# Patient Record
Sex: MALE | Race: WHITE | NOT HISPANIC OR LATINO | Employment: OTHER | URBAN - METROPOLITAN AREA
[De-identification: names, ages, dates, MRNs, and addresses within clinical notes are randomized per-mention and may not be internally consistent; named-entity substitution may affect disease eponyms.]

---

## 2023-08-22 ENCOUNTER — APPOINTMENT (OUTPATIENT)
Dept: MRI IMAGING | Facility: CLINIC | Age: 53
End: 2023-08-22
Attending: EMERGENCY MEDICINE
Payer: COMMERCIAL

## 2023-08-22 ENCOUNTER — APPOINTMENT (OUTPATIENT)
Dept: CT IMAGING | Facility: CLINIC | Age: 53
End: 2023-08-22
Attending: EMERGENCY MEDICINE
Payer: COMMERCIAL

## 2023-08-22 ENCOUNTER — HOSPITAL ENCOUNTER (EMERGENCY)
Facility: CLINIC | Age: 53
Discharge: HOME | End: 2023-08-22
Attending: EMERGENCY MEDICINE | Admitting: EMERGENCY MEDICINE
Payer: COMMERCIAL

## 2023-08-22 VITALS
DIASTOLIC BLOOD PRESSURE: 109 MMHG | OXYGEN SATURATION: 98 % | RESPIRATION RATE: 18 BRPM | SYSTOLIC BLOOD PRESSURE: 147 MMHG | WEIGHT: 195 LBS | HEART RATE: 80 BPM | TEMPERATURE: 98.2 F

## 2023-08-22 DIAGNOSIS — M50.222 HERNIATION OF INTERVERTEBRAL DISC AT C5-C6 LEVEL: ICD-10-CM

## 2023-08-22 DIAGNOSIS — M48.02 CERVICAL STENOSIS OF SPINAL CANAL: ICD-10-CM

## 2023-08-22 LAB
ANION GAP SERPL CALCULATED.3IONS-SCNC: 9 MMOL/L (ref 7–15)
ATRIAL RATE - MUSE: 67 BPM
BASOPHILS # BLD AUTO: 0.1 10E3/UL (ref 0–0.2)
BASOPHILS NFR BLD AUTO: 0 %
BUN SERPL-MCNC: 16 MG/DL (ref 6–20)
CALCIUM SERPL-MCNC: 9 MG/DL (ref 8.6–10)
CHLORIDE SERPL-SCNC: 103 MMOL/L (ref 98–107)
CREAT SERPL-MCNC: 0.98 MG/DL (ref 0.67–1.17)
DEPRECATED HCO3 PLAS-SCNC: 27 MMOL/L (ref 22–29)
DIASTOLIC BLOOD PRESSURE - MUSE: NORMAL MMHG
EOSINOPHIL # BLD AUTO: 0.1 10E3/UL (ref 0–0.7)
EOSINOPHIL NFR BLD AUTO: 1 %
ERYTHROCYTE [DISTWIDTH] IN BLOOD BY AUTOMATED COUNT: 13 % (ref 10–15)
GFR SERPL CREATININE-BSD FRML MDRD: >90 ML/MIN/1.73M2
GLUCOSE SERPL-MCNC: 99 MG/DL (ref 70–99)
HCT VFR BLD AUTO: 44.6 % (ref 40–53)
HGB BLD-MCNC: 14.9 G/DL (ref 13.3–17.7)
HOLD SPECIMEN: NORMAL
HOLD SPECIMEN: NORMAL
IMM GRANULOCYTES # BLD: 0 10E3/UL
IMM GRANULOCYTES NFR BLD: 0 %
INTERPRETATION ECG - MUSE: NORMAL
LYMPHOCYTES # BLD AUTO: 1.9 10E3/UL (ref 0.8–5.3)
LYMPHOCYTES NFR BLD AUTO: 16 %
MCH RBC QN AUTO: 29.3 PG (ref 26.5–33)
MCHC RBC AUTO-ENTMCNC: 33.4 G/DL (ref 31.5–36.5)
MCV RBC AUTO: 88 FL (ref 78–100)
MONOCYTES # BLD AUTO: 0.7 10E3/UL (ref 0–1.3)
MONOCYTES NFR BLD AUTO: 6 %
NEUTROPHILS # BLD AUTO: 9.2 10E3/UL (ref 1.6–8.3)
NEUTROPHILS NFR BLD AUTO: 77 %
NRBC # BLD AUTO: 0 10E3/UL
NRBC BLD AUTO-RTO: 0 /100
P AXIS - MUSE: 10 DEGREES
PLATELET # BLD AUTO: 265 10E3/UL (ref 150–450)
POTASSIUM SERPL-SCNC: 4.3 MMOL/L (ref 3.4–5.3)
PR INTERVAL - MUSE: 148 MS
QRS DURATION - MUSE: 88 MS
QT - MUSE: 416 MS
QTC - MUSE: 439 MS
R AXIS - MUSE: 56 DEGREES
RBC # BLD AUTO: 5.08 10E6/UL (ref 4.4–5.9)
SODIUM SERPL-SCNC: 139 MMOL/L (ref 136–145)
SYSTOLIC BLOOD PRESSURE - MUSE: NORMAL MMHG
T AXIS - MUSE: 46 DEGREES
TROPONIN T SERPL HS-MCNC: 7 NG/L
VENTRICULAR RATE- MUSE: 67 BPM
WBC # BLD AUTO: 12 10E3/UL (ref 4–11)

## 2023-08-22 PROCEDURE — 250N000011 HC RX IP 250 OP 636: Performed by: EMERGENCY MEDICINE

## 2023-08-22 PROCEDURE — 255N000002 HC RX 255 OP 636: Performed by: EMERGENCY MEDICINE

## 2023-08-22 PROCEDURE — 70498 CT ANGIOGRAPHY NECK: CPT

## 2023-08-22 PROCEDURE — 80048 BASIC METABOLIC PNL TOTAL CA: CPT | Performed by: EMERGENCY MEDICINE

## 2023-08-22 PROCEDURE — 70496 CT ANGIOGRAPHY HEAD: CPT

## 2023-08-22 PROCEDURE — 250N000013 HC RX MED GY IP 250 OP 250 PS 637: Performed by: EMERGENCY MEDICINE

## 2023-08-22 PROCEDURE — 93005 ELECTROCARDIOGRAM TRACING: CPT | Mod: RTG

## 2023-08-22 PROCEDURE — 99285 EMERGENCY DEPT VISIT HI MDM: CPT | Mod: 25

## 2023-08-22 PROCEDURE — 70551 MRI BRAIN STEM W/O DYE: CPT

## 2023-08-22 PROCEDURE — 72156 MRI NECK SPINE W/O & W/DYE: CPT

## 2023-08-22 PROCEDURE — 84484 ASSAY OF TROPONIN QUANT: CPT | Performed by: EMERGENCY MEDICINE

## 2023-08-22 PROCEDURE — 85025 COMPLETE CBC W/AUTO DIFF WBC: CPT | Performed by: EMERGENCY MEDICINE

## 2023-08-22 PROCEDURE — 36415 COLL VENOUS BLD VENIPUNCTURE: CPT | Performed by: EMERGENCY MEDICINE

## 2023-08-22 PROCEDURE — A9585 GADOBUTROL INJECTION: HCPCS | Performed by: EMERGENCY MEDICINE

## 2023-08-22 RX ORDER — TRAMADOL HYDROCHLORIDE 50 MG/1
50 TABLET ORAL EVERY 6 HOURS PRN
Qty: 10 TABLET | Refills: 0 | Status: SHIPPED | OUTPATIENT
Start: 2023-08-22 | End: 2023-08-25

## 2023-08-22 RX ORDER — GADOBUTROL 604.72 MG/ML
9 INJECTION INTRAVENOUS ONCE
Status: COMPLETED | OUTPATIENT
Start: 2023-08-22 | End: 2023-08-22

## 2023-08-22 RX ORDER — METHYLPREDNISOLONE 4 MG
TABLET, DOSE PACK ORAL
Qty: 21 TABLET | Refills: 0 | Status: SHIPPED | OUTPATIENT
Start: 2023-08-22 | End: 2023-09-02

## 2023-08-22 RX ORDER — IOPAMIDOL 755 MG/ML
500 INJECTION, SOLUTION INTRAVASCULAR ONCE
Status: COMPLETED | OUTPATIENT
Start: 2023-08-22 | End: 2023-08-22

## 2023-08-22 RX ORDER — CYCLOBENZAPRINE HCL 10 MG
10 TABLET ORAL ONCE
Status: COMPLETED | OUTPATIENT
Start: 2023-08-22 | End: 2023-08-22

## 2023-08-22 RX ADMIN — IOPAMIDOL 75 ML: 755 INJECTION, SOLUTION INTRAVENOUS at 19:39

## 2023-08-22 RX ADMIN — GADOBUTROL 9 ML: 604.72 INJECTION INTRAVENOUS at 18:33

## 2023-08-22 RX ADMIN — CYCLOBENZAPRINE HYDROCHLORIDE 10 MG: 10 TABLET, FILM COATED ORAL at 15:43

## 2023-08-22 ASSESSMENT — ACTIVITIES OF DAILY LIVING (ADL)
ADLS_ACUITY_SCORE: 35
ADLS_ACUITY_SCORE: 35
ADLS_ACUITY_SCORE: 33
ADLS_ACUITY_SCORE: 35

## 2023-08-22 NOTE — ED TRIAGE NOTES
Pt reports that he has been having left arm and left sided facial numbness since July, Pt reports that last night he noticed left ear ringing and left shoulder pain. Pt reports that he got back from [place] in July when this all started. PT had COVID testing and RSV testing at this time due to symptoms. PT BEFAST negative. VSS and ABC's intact

## 2023-08-23 NOTE — DISCHARGE INSTRUCTIONS
You should follow up in NSGY clinic, they will call you.  Return immediately to the ER if you have increased numbness or weakness in your hands, upper arms, or legs.

## 2023-08-23 NOTE — ED PROVIDER NOTES
"  History     Chief Complaint:  No chief complaint on file.       The history is provided by the patient.      Enmanuel Randall is a 53 year old male who presents to the ED with a pinched nerve. Patient states that he is having worsening symptoms from his pinched nerve. Patient reports that he is having a shooting pain down his left arm. He states that the left side of his face feels \"tingly\" and numb. Patient states that he has an appointment with his primary care provider in a couple of days. He has Tylenol and ibuprofen for it. He states that it gets worse when slouching and or tilting his head straight up. He had a total  replacement of his C5 in 2015.      Independent Historian:   None - Patient Only    Review of External Notes:   None     Medications:    Medrol Dosepak  Ultram    Past Medical History:    ED  Dyslipidemia    Past Surgical History:    The patient denies any prior surgical history.    Physical Exam   Patient Vitals for the past 24 hrs:   BP Temp Temp src Pulse Resp SpO2 Weight   08/22/23 2124 (!) 147/109 -- -- 80 -- 98 % --   08/22/23 1146 (!) 170/116 98.2  F (36.8  C) Temporal 87 18 97 % 88.5 kg (195 lb)      Physical Exam  Constitutional: Alert, attentive, GCS 15   HENT:    Neck: Ranging his neck freely  Eyes: EOM are normal, anicteric, conjugate gaze  CV: distal extremities warm, well perfused  Chest: Non-labored breathing on RA  GI:  non tender. No distension. No guarding or rebound.    Neurological: Alert, attentive, moving all extremities equally.   strength 5 out of 5, elbow flexion 5 out of 5, shoulder shrug 5 out of 5  Skin: Skin is warm and dry.      Emergency Department Course   Imaging:  CTA Head Neck with Contrast   Final Result   IMPRESSION:       HEAD CTA:    1.  Normal CTA [place] [facility].      NECK CTA:   1.  Normal neck CTA.      MR Brain w/o Contrast   Final Result   IMPRESSION:   1.  Normal brain MRI.      MR Cervical Spine w/o & w Contrast   Final Result   IMPRESSION:   1.  " C6-C7 disc surgery. Probable disc prosthesis.      2.  Moderate-sized C5-C6 disc protrusion with some associated osteophyte. Moderate to severe central canal stenosis with cord flattening. No cord signal change.      3.  Small, central C7-T1 disc extrusion      4.  Small left paracentral C4-C5 disc protrusion.      5.  Small to moderate-sized left paracentral C3-C4 disc extrusion.      6.  Tiny, central C2-C3 disc protrusion.            Report per radiology    Laboratory:  Labs Ordered and Resulted from Time of ED Arrival to Time of ED Departure   CBC WITH PLATELETS AND DIFFERENTIAL - Abnormal       Result Value    WBC Count 12.0 (*)     RBC Count 5.08      Hemoglobin 14.9      Hematocrit 44.6      MCV 88      MCH 29.3      MCHC 33.4      RDW 13.0      Platelet Count 265      % Neutrophils 77      % Lymphocytes 16      % Monocytes 6      % Eosinophils 1      % Basophils 0      % Immature Granulocytes 0      NRBCs per 100 WBC 0      Absolute Neutrophils 9.2 (*)     Absolute Lymphocytes 1.9      Absolute Monocytes 0.7      Absolute Eosinophils 0.1      Absolute Basophils 0.1      Absolute Immature Granulocytes 0.0      Absolute NRBCs 0.0     BASIC METABOLIC PANEL - Normal    Sodium 139      Potassium 4.3      Chloride 103      Carbon Dioxide (CO2) 27      Anion Gap 9      Urea Nitrogen 16.0      Creatinine 0.98      Calcium 9.0      Glucose 99      GFR Estimate >90     TROPONIN T, HIGH SENSITIVITY - Normal    Troponin T, High Sensitivity 7        Emergency Department Course & Assessments:     Interventions:  Medications   cyclobenzaprine (FLEXERIL) tablet 10 mg (10 mg Oral $Given 8/22/23 1543)   gadobutrol (GADAVIST) injection 9 mL (9 mLs Intravenous $Given 8/22/23 1833)   iopamidol (ISOVUE-370) solution 500 mL (75 mLs Intravenous $Given 8/22/23 1939)   sodium chloride (PF) 0.9% PF flush 100 mL (80 mLs Intravenous $Given 8/22/23 1939)      Assessments:  1528 I obtained the history noted above from the patient.  2202 I  rechecked the patient and explained findings.    Independent Interpretation (X-rays, CTs, rhythm strip):  None    Consultations/Discussion of Management or Tests:  None        Social Determinants of Health affecting care:   None    Disposition:  The patient was discharged to home.     Impression & Plan    Medical Decision Makin-year-old male past medical history significant for prior C5 reconstruction and discectomy presenting for increasing neck pain associated with some left-sided facial numbness, he denies hand or leg weakness.  Due to his history, MRI of the cervical spine and brain were obtained, brain MRI was normal.  Cervical MRI shows moderate to severe central stenosis at C5-C6 with disc protrusion, I suspect this is likely etiology of his pain as he has a positive Lhermitte sign based on history however no numbness or weakness here.  I did review the MRI and his exam with neurosurgery, they recommended outpatient follow-up.  I did give him strict return precautions to return should he have progressive symptoms especially weakness or numbness in his hands or trouble walking.     Diagnosis:    ICD-10-CM    1. Cervical stenosis of spinal canal  M48.02       2. Herniation of intervertebral disc at C5-C6 level  M50.222            Discharge Medications:  New Prescriptions    METHYLPREDNISOLONE (MEDROL DOSEPAK) 4 MG TABLET THERAPY PACK    Follow Package Directions    TRAMADOL (ULTRAM) 50 MG TABLET    Take 1 tablet (50 mg) by mouth every 6 hours as needed for severe pain      Alonzo Samson MD  [employer]  11:05 PM 23     Scribe Disclosure:  I, Zander Shin, am serving as a scribe at 9:44 PM on 2023 to document services personally performed by Alonzo Samson MD based on my observations and the provider's statements to me.   2023   Alonzo Samson MD Dunbar, John Forrest, MD  [phone]

## 2023-08-23 NOTE — PROGRESS NOTES
Contacted regarding 52 yo male presenting to ER with left sided facial numbness and left arm symptoms with what sounds like Lhermitte's sign on exam.     Cervical MRI with stenosis, brain MRI and CTA unremarkable.    Cervical MRI:  IMPRESSION:  1.  C6-C7 disc surgery. Probable disc prosthesis.     2.  Moderate-sized C5-C6 disc protrusion with some associated osteophyte. Moderate to severe central canal stenosis with cord flattening. No cord signal change.     3.  Small, central C7-T1 disc extrusion     4.  Small left paracentral C4-C5 disc protrusion.     5.  Small to moderate-sized left paracentral C3-C4 disc extrusion.     6.  Tiny, central C2-C3 disc protrusion.    RECOMMENDATIONS:  Follow up in our NS clinic for cervical pathology.  Neurology may be helpful for work up of facial numbness as this is not related to cervical spine pathology.    Discussed with Dr. Garcia.    Susi Calzada, MATTHEWS  [facility] Neurosurgery  [address] [address]    Tel [phone]  Pager [phone]           104.3

## 2023-08-31 ENCOUNTER — OFFICE VISIT (OUTPATIENT)
Dept: NEUROSURGERY | Facility: CLINIC | Age: 53
End: 2023-08-31
Payer: COMMERCIAL

## 2023-08-31 VITALS — DIASTOLIC BLOOD PRESSURE: 87 MMHG | HEART RATE: 87 BPM | SYSTOLIC BLOOD PRESSURE: 139 MMHG | OXYGEN SATURATION: 97 %

## 2023-08-31 DIAGNOSIS — M54.12 CERVICAL RADICULOPATHY: Primary | ICD-10-CM

## 2023-08-31 DIAGNOSIS — M48.02 SPINAL STENOSIS IN CERVICAL REGION: Primary | ICD-10-CM

## 2023-08-31 PROCEDURE — 99204 OFFICE O/P NEW MOD 45 MIN: CPT | Performed by: NEUROLOGICAL SURGERY

## 2023-08-31 RX ORDER — OXYCODONE HYDROCHLORIDE 5 MG/1
5 TABLET ORAL EVERY 6 HOURS PRN
Qty: 25 TABLET | Refills: 0 | Status: ON HOLD | OUTPATIENT
Start: 2023-08-31 | End: 2023-10-10

## 2023-08-31 NOTE — TELEPHONE ENCOUNTER
Patient requesting prescription for oxycodone and that it be sent to Pharmacy at [facility] in [place].

## 2023-08-31 NOTE — LETTER
8/31/2023         RE: Enmanuel Randall  [address] [place] [address]        Dear Colleague,    Thank you for referring your patient, Enmanuel Randall, to the [facility]. Please see a copy of my visit note below.    Reviewed pre- and post-operative instructions as outlined in the After Visit Summary/Patient Instructions with patient.   Surgery folder was given to patient    Patient Education Topic: Procedure with Dr. Salvador     Learner(s): Patient    Knowledge Level: Basic    Readiness to Learn: Ready    Method:  Verbal explanation and Written material     Outcome: Able to verbalize instructions    Barriers to Learning: No barrier    Covid Testing: n/a    Scheduling Number: [phone]    NDI/JACQUELINE: Confirmation of completion within last 6 months    Pain Clinic: N/A    Patient had the opportunity for questions to be answered. Advised Patient to call clinic with any questions/concerns. Gave patient antibacterial soap for pre-surgery skin preparation.     Joycelyn Pacheco RN on 8/31/2023 at 10:27 AM      I was asked by Dr. Santillan to see this patient in consultation    53 year old male with history of C6-7 arthroplasty, presents with severe left arm pain and weakness, C5-6 disc herniation and severe stenosis.  2 weeks of severe pain radiating from the left neck to the shoulder, biceps, and forearm.  Debilitating pain and weakness.  Past surgery in 2015 with Dr. Toth.  Did medrol dosepack and medical management without improvement.  MR Cervical, personally reviewed, with C6-7 post-op changes, C5-6 left sided herniation with severe central/foraminal stenosis.       No past medical history on file.  No past surgical history on file.  Social History     Socioeconomic History     Marital status:      Spouse name: Not on file     Number of children: Not on file     Years of education: Not on file     Highest education level: Not on file   Occupational History     Not on file    Tobacco Use     Smoking status: Not on file     Smokeless tobacco: Not on file   Substance and Sexual Activity     Alcohol use: Not on file     Drug use: Not on file     Sexual activity: Not on file   Other Topics Concern     Not on file   Social History Narrative     Not on file     Social Determinants of Health     Financial Resource Strain: Not on file   Food Insecurity: Not on file   Transportation Needs: Not on file   Physical Activity: Not on file   Stress: Not on file   Social Connections: Not on file   Intimate Partner Violence: Not on file   Housing Stability: Not on file     No family history on file.     ROS: 10 point ROS neg other than the symptoms noted above in the HPI.    Physical Exam  /87   Pulse 87   SpO2 97%   HEENT:  Normocephalic, atraumatic.  PERRLA.  EOM s intact.  Visual fields full to gross exam  Neck:  Supple, non-tender, without lymphadenopathy.  Heart:  No peripheral edema  Lungs:  No SOB  Abdomen:  Non-distended.   Skin:  Warm and dry.  Extremities:  No edema, cyanosis or clubbing.  Psychiatric:  No apparent distress  Musculoskeletal:  Normal bulk and tone    NEUROLOGICAL EXAMINATION:     Mental status:  Alert and Oriented x 3, speech is fluent.  Cranial nerves:  II-XII intact.   Motor:    Shoulder Abduction:  Right:  5/5   Left:  5/5  Biceps:                      Right:  5/5   Left:  4/5  Triceps:                     Right:  5/5   Left:  5/5  Wrist Extensors:       Right:  5/5   Left:  5/5  Wrist Flexors:           Right:  5/5   Left:  5/5  interosseus :            Right:  5/5   Left:  5/5  Hip Flexion:                Right: 5/5  Left:  5/5  Quadriceps:             Right:  5/5  Left:  5/5  Hamstrings:             Right:  5/5  Left:  5/5  Gastroc Soleus:        Right:  5/5  Left:  5/5  Tib/Ant:                      Right:  5/5  Left:  5/5  EHL:                     Right:  5/5  Left:  5/5  Sensation:  Intact  Reflexes:  Negative Babinski.  Negative Clonus.  Negative  Cobian's.  Coordination:  Smooth finger to nose testing.   Negative pronator drift.  Smooth tandem walking.    A/P:  53 year old male with history of C6-7 arthroplasty, presents with severe left arm pain and weakness, C5-6 disc herniation and severe stenosis    I had a discussion with the patient, reviewing the history, symptoms, and imaging  Discussed options  Given his severe stenosis, severity of pain, and weakness, he is not a candidate for physical therapy  Will plan for C5-6 arthroplasty  Risks and benefits discussed          Again, thank you for allowing me to participate in the care of your patient.        Sincerely,        Jered Salvador MD

## 2023-08-31 NOTE — PROGRESS NOTES
I was asked by Dr. Santillan to see this patient in consultation    53 year old male with history of C6-7 arthroplasty, presents with severe left arm pain and weakness, C5-6 disc herniation and severe stenosis.  2 weeks of severe pain radiating from the left neck to the shoulder, biceps, and forearm.  Debilitating pain and weakness.  Past surgery in 2015 with Dr. Toth.  Did medrol dosepack and medical management without improvement.  MR Cervical, personally reviewed, with C6-7 post-op changes, C5-6 left sided herniation with severe central/foraminal stenosis.       No past medical history on file.  No past surgical history on file.  Social History     Socioeconomic History    Marital status:      Spouse name: Not on file    Number of children: Not on file    Years of education: Not on file    Highest education level: Not on file   Occupational History    Not on file   Tobacco Use    Smoking status: Not on file    Smokeless tobacco: Not on file   Substance and Sexual Activity    Alcohol use: Not on file    Drug use: Not on file    Sexual activity: Not on file   Other Topics Concern    Not on file   Social History Narrative    Not on file     Social Determinants of Health     Financial Resource Strain: Not on file   Food Insecurity: Not on file   Transportation Needs: Not on file   Physical Activity: Not on file   Stress: Not on file   Social Connections: Not on file   Intimate Partner Violence: Not on file   Housing Stability: Not on file     No family history on file.     ROS: 10 point ROS neg other than the symptoms noted above in the HPI.    Physical Exam  /87   Pulse 87   SpO2 97%   HEENT:  Normocephalic, atraumatic.  PERRLA.  EOM s intact.  Visual fields full to gross exam  Neck:  Supple, non-tender, without lymphadenopathy.  Heart:  No peripheral edema  Lungs:  No SOB  Abdomen:  Non-distended.   Skin:  Warm and dry.  Extremities:  No edema, cyanosis or clubbing.  Psychiatric:  No apparent  distress  Musculoskeletal:  Normal bulk and tone    NEUROLOGICAL EXAMINATION:     Mental status:  Alert and Oriented x 3, speech is fluent.  Cranial nerves:  II-XII intact.   Motor:    Shoulder Abduction:  Right:  5/5   Left:  5/5  Biceps:                      Right:  5/5   Left:  4/5  Triceps:                     Right:  5/5   Left:  5/5  Wrist Extensors:       Right:  5/5   Left:  5/5  Wrist Flexors:           Right:  5/5   Left:  5/5  interosseus :            Right:  5/5   Left:  5/5  Hip Flexion:                Right: 5/5  Left:  5/5  Quadriceps:             Right:  5/5  Left:  5/5  Hamstrings:             Right:  5/5  Left:  5/5  Gastroc Soleus:        Right:  5/5  Left:  5/5  Tib/Ant:                      Right:  5/5  Left:  5/5  EHL:                     Right:  5/5  Left:  5/5  Sensation:  Intact  Reflexes:  Negative Babinski.  Negative Clonus.  Negative Cobian's.  Coordination:  Smooth finger to nose testing.   Negative pronator drift.  Smooth tandem walking.    A/P:  53 year old male with history of C6-7 arthroplasty, presents with severe left arm pain and weakness, C5-6 disc herniation and severe stenosis    I had a discussion with the patient, reviewing the history, symptoms, and imaging  Discussed options  Given his severe stenosis, severity of pain, and weakness, he is not a candidate for physical therapy  Will plan for C5-6 arthroplasty  Risks and benefits discussed

## 2023-08-31 NOTE — PROGRESS NOTES
Reviewed pre- and post-operative instructions as outlined in the After Visit Summary/Patient Instructions with patient.   Surgery folder was given to patient    Patient Education Topic: Procedure with Dr. Salvador     Learner(s): Patient    Knowledge Level: Basic    Readiness to Learn: Ready    Method:  Verbal explanation and Written material     Outcome: Able to verbalize instructions    Barriers to Learning: No barrier    Covid Testing: n/a    Scheduling Number: [phone]    NDI/JACQUELINE: Confirmation of completion within last 6 months    Pain Clinic: N/A    Patient had the opportunity for questions to be answered. Advised Patient to call clinic with any questions/concerns. Gave patient antibacterial soap for pre-surgery skin preparation.     Joycelyn Pacheco RN on 8/31/2023 at 10:27 AM

## 2023-08-31 NOTE — NURSING NOTE
Enmanuel Randall is a 53 year old male who presents for:  Chief Complaint   Patient presents with    Consult     Sharp shooting Left sided neck pain with numbness going up side of face         Initial Vitals:  /87   Pulse 87   SpO2 97%  There is no height or weight on file to calculate BMI.. There is no height or weight on file to calculate BSA. BP completed using cuff size: regular  Data Unavailable    Nursing Comments:       Jyoti Berger

## 2023-08-31 NOTE — TELEPHONE ENCOUNTER
Reviewed surgery education with patient during clinic. He mentioned having a script for tramadol at home that was just a one time order.     Pended oxycodone and sending to Dr. Salvador for approval.    Surgery scheduled 10/10/23    Joycelyn Pacheco RN on 8/31/2023 at 11:08 AM

## 2023-08-31 NOTE — TELEPHONE ENCOUNTER
Received incoming call from [facility] pharmacy wanting to verify oxycodone script. Reviewed it was approved as a pre-op prescription per Dr. Salvador.     They had no further questions at this time.

## 2023-09-02 ENCOUNTER — HOSPITAL ENCOUNTER (INPATIENT)
Facility: CLINIC | Age: 53
LOS: 2 days | Discharge: HOME | DRG: 378 | End: 2023-09-04
Attending: STUDENT IN AN ORGANIZED HEALTH CARE EDUCATION/TRAINING PROGRAM | Admitting: EMERGENCY MEDICINE
Payer: COMMERCIAL

## 2023-09-02 ENCOUNTER — ANESTHESIA EVENT (OUTPATIENT)
Dept: SURGERY | Facility: CLINIC | Age: 53
DRG: 378 | End: 2023-09-02
Payer: COMMERCIAL

## 2023-09-02 DIAGNOSIS — M54.12 CERVICAL RADICULOPATHY: Primary | ICD-10-CM

## 2023-09-02 DIAGNOSIS — K92.2 UPPER GI BLEED: ICD-10-CM

## 2023-09-02 PROBLEM — M50.20 CERVICAL HERNIATED DISC: Status: RESOLVED | Noted: 2023-09-02 | Resolved: 2023-09-02

## 2023-09-02 PROBLEM — M50.20 CERVICAL HERNIATED DISC: Status: ACTIVE | Noted: 2023-09-02

## 2023-09-02 PROBLEM — K92.1 MELENA: Status: ACTIVE | Noted: 2023-09-02

## 2023-09-02 LAB
ABO/RH(D): NORMAL
ALBUMIN SERPL-MCNC: 3.3 G/DL (ref 3.5–5)
ALP SERPL-CCNC: 58 U/L (ref 45–120)
ALT SERPL W P-5'-P-CCNC: 18 U/L (ref 0–45)
ANION GAP SERPL CALCULATED.3IONS-SCNC: 10 MMOL/L (ref 5–18)
ANTIBODY SCREEN: NEGATIVE
APTT PPP: 23 SECONDS (ref 22–38)
AST SERPL W P-5'-P-CCNC: 19 U/L (ref 0–40)
ATRIAL RATE - MUSE: 117 BPM
BASOPHILS # BLD AUTO: 0.1 10E3/UL (ref 0–0.2)
BASOPHILS NFR BLD AUTO: 1 %
BILIRUB SERPL-MCNC: 0.4 MG/DL (ref 0–1)
BLD PROD TYP BPU: NORMAL
BLD PROD TYP BPU: NORMAL
BLOOD COMPONENT TYPE: NORMAL
BLOOD COMPONENT TYPE: NORMAL
BUN SERPL-MCNC: 30 MG/DL (ref 8–22)
CALCIUM SERPL-MCNC: 7.8 MG/DL (ref 8.5–10.5)
CHLORIDE BLD-SCNC: 109 MMOL/L (ref 98–107)
CO2 SERPL-SCNC: 19 MMOL/L (ref 22–31)
CODING SYSTEM: NORMAL
CODING SYSTEM: NORMAL
CREAT SERPL-MCNC: 0.77 MG/DL (ref 0.7–1.3)
CROSSMATCH: NORMAL
CROSSMATCH: NORMAL
DIASTOLIC BLOOD PRESSURE - MUSE: NORMAL MMHG
EOSINOPHIL # BLD AUTO: 0.2 10E3/UL (ref 0–0.7)
EOSINOPHIL NFR BLD AUTO: 2 %
ERYTHROCYTE [DISTWIDTH] IN BLOOD BY AUTOMATED COUNT: 13.3 % (ref 10–15)
GFR SERPL CREATININE-BSD FRML MDRD: >90 ML/MIN/1.73M2
GLUCOSE BLD-MCNC: 103 MG/DL (ref 70–125)
HCT VFR BLD AUTO: 31.1 % (ref 40–53)
HGB BLD-MCNC: 10.3 G/DL (ref 13.3–17.7)
HGB BLD-MCNC: 7.3 G/DL (ref 13.3–17.7)
HGB BLD-MCNC: 7.8 G/DL (ref 13.3–17.7)
IMM GRANULOCYTES # BLD: 0.1 10E3/UL
IMM GRANULOCYTES NFR BLD: 1 %
INR PPP: 0.95 (ref 0.85–1.15)
INTERPRETATION ECG - MUSE: NORMAL
ISSUE DATE AND TIME: NORMAL
ISSUE DATE AND TIME: NORMAL
LYMPHOCYTES # BLD AUTO: 3.1 10E3/UL (ref 0.8–5.3)
LYMPHOCYTES NFR BLD AUTO: 29 %
MCH RBC QN AUTO: 29.4 PG (ref 26.5–33)
MCHC RBC AUTO-ENTMCNC: 33.1 G/DL (ref 31.5–36.5)
MCV RBC AUTO: 89 FL (ref 78–100)
MONOCYTES # BLD AUTO: 0.5 10E3/UL (ref 0–1.3)
MONOCYTES NFR BLD AUTO: 4 %
NEUTROPHILS # BLD AUTO: 6.4 10E3/UL (ref 1.6–8.3)
NEUTROPHILS NFR BLD AUTO: 63 %
NRBC # BLD AUTO: 0 10E3/UL
NRBC BLD AUTO-RTO: 0 /100
P AXIS - MUSE: 33 DEGREES
PLATELET # BLD AUTO: 271 10E3/UL (ref 150–450)
POTASSIUM BLD-SCNC: 3.9 MMOL/L (ref 3.5–5)
PR INTERVAL - MUSE: 130 MS
PROT SERPL-MCNC: 5.9 G/DL (ref 6–8)
QRS DURATION - MUSE: 84 MS
QT - MUSE: 326 MS
QTC - MUSE: 454 MS
R AXIS - MUSE: -7 DEGREES
RBC # BLD AUTO: 3.5 10E6/UL (ref 4.4–5.9)
SODIUM SERPL-SCNC: 138 MMOL/L (ref 136–145)
SPECIMEN EXPIRATION DATE: NORMAL
SYSTOLIC BLOOD PRESSURE - MUSE: NORMAL MMHG
T AXIS - MUSE: 5 DEGREES
TROPONIN I SERPL-MCNC: 0.02 NG/ML (ref 0–0.29)
UNIT ABO/RH: NORMAL
UNIT ABO/RH: NORMAL
UNIT NUMBER: NORMAL
UNIT NUMBER: NORMAL
UNIT STATUS: NORMAL
UNIT STATUS: NORMAL
UNIT TYPE ISBT: 5100
UNIT TYPE ISBT: 5100
VENTRICULAR RATE- MUSE: 117 BPM
WBC # BLD AUTO: 10.4 10E3/UL (ref 4–11)

## 2023-09-02 PROCEDURE — P9016 RBC LEUKOCYTES REDUCED: HCPCS | Performed by: STUDENT IN AN ORGANIZED HEALTH CARE EDUCATION/TRAINING PROGRAM

## 2023-09-02 PROCEDURE — 99223 1ST HOSP IP/OBS HIGH 75: CPT | Mod: AI | Performed by: EMERGENCY MEDICINE

## 2023-09-02 PROCEDURE — 82310 ASSAY OF CALCIUM: CPT | Performed by: STUDENT IN AN ORGANIZED HEALTH CARE EDUCATION/TRAINING PROGRAM

## 2023-09-02 PROCEDURE — 86901 BLOOD TYPING SEROLOGIC RH(D): CPT | Performed by: STUDENT IN AN ORGANIZED HEALTH CARE EDUCATION/TRAINING PROGRAM

## 2023-09-02 PROCEDURE — 86850 RBC ANTIBODY SCREEN: CPT | Performed by: STUDENT IN AN ORGANIZED HEALTH CARE EDUCATION/TRAINING PROGRAM

## 2023-09-02 PROCEDURE — 84484 ASSAY OF TROPONIN QUANT: CPT | Performed by: STUDENT IN AN ORGANIZED HEALTH CARE EDUCATION/TRAINING PROGRAM

## 2023-09-02 PROCEDURE — 85018 HEMOGLOBIN: CPT | Performed by: EMERGENCY MEDICINE

## 2023-09-02 PROCEDURE — 36415 COLL VENOUS BLD VENIPUNCTURE: CPT | Performed by: STUDENT IN AN ORGANIZED HEALTH CARE EDUCATION/TRAINING PROGRAM

## 2023-09-02 PROCEDURE — C9113 INJ PANTOPRAZOLE SODIUM, VIA: HCPCS | Mod: JZ | Performed by: STUDENT IN AN ORGANIZED HEALTH CARE EDUCATION/TRAINING PROGRAM

## 2023-09-02 PROCEDURE — 250N000011 HC RX IP 250 OP 636: Mod: JZ | Performed by: EMERGENCY MEDICINE

## 2023-09-02 PROCEDURE — C9113 INJ PANTOPRAZOLE SODIUM, VIA: HCPCS | Mod: JZ | Performed by: EMERGENCY MEDICINE

## 2023-09-02 PROCEDURE — 250N000011 HC RX IP 250 OP 636: Mod: JZ | Performed by: STUDENT IN AN ORGANIZED HEALTH CARE EDUCATION/TRAINING PROGRAM

## 2023-09-02 PROCEDURE — 86923 COMPATIBILITY TEST ELECTRIC: CPT | Performed by: EMERGENCY MEDICINE

## 2023-09-02 PROCEDURE — 85730 THROMBOPLASTIN TIME PARTIAL: CPT | Performed by: STUDENT IN AN ORGANIZED HEALTH CARE EDUCATION/TRAINING PROGRAM

## 2023-09-02 PROCEDURE — 99285 EMERGENCY DEPT VISIT HI MDM: CPT | Mod: 25

## 2023-09-02 PROCEDURE — 85610 PROTHROMBIN TIME: CPT | Performed by: STUDENT IN AN ORGANIZED HEALTH CARE EDUCATION/TRAINING PROGRAM

## 2023-09-02 PROCEDURE — 120N000001 HC R&B MED SURG/OB

## 2023-09-02 PROCEDURE — 85004 AUTOMATED DIFF WBC COUNT: CPT | Performed by: STUDENT IN AN ORGANIZED HEALTH CARE EDUCATION/TRAINING PROGRAM

## 2023-09-02 PROCEDURE — 250N000013 HC RX MED GY IP 250 OP 250 PS 637: Performed by: EMERGENCY MEDICINE

## 2023-09-02 PROCEDURE — 96374 THER/PROPH/DIAG INJ IV PUSH: CPT

## 2023-09-02 PROCEDURE — 36415 COLL VENOUS BLD VENIPUNCTURE: CPT | Performed by: EMERGENCY MEDICINE

## 2023-09-02 PROCEDURE — 96375 TX/PRO/DX INJ NEW DRUG ADDON: CPT

## 2023-09-02 PROCEDURE — 258N000003 HC RX IP 258 OP 636: Performed by: EMERGENCY MEDICINE

## 2023-09-02 PROCEDURE — 93005 ELECTROCARDIOGRAM TRACING: CPT | Performed by: STUDENT IN AN ORGANIZED HEALTH CARE EDUCATION/TRAINING PROGRAM

## 2023-09-02 PROCEDURE — 86923 COMPATIBILITY TEST ELECTRIC: CPT | Performed by: STUDENT IN AN ORGANIZED HEALTH CARE EDUCATION/TRAINING PROGRAM

## 2023-09-02 PROCEDURE — 96361 HYDRATE IV INFUSION ADD-ON: CPT

## 2023-09-02 PROCEDURE — 258N000003 HC RX IP 258 OP 636: Performed by: STUDENT IN AN ORGANIZED HEALTH CARE EDUCATION/TRAINING PROGRAM

## 2023-09-02 RX ORDER — ALBUTEROL SULFATE 90 UG/1
1-2 AEROSOL, METERED RESPIRATORY (INHALATION) EVERY 4 HOURS PRN
Status: DISCONTINUED | OUTPATIENT
Start: 2023-09-02 | End: 2023-09-04 | Stop reason: HOSPADM

## 2023-09-02 RX ORDER — ACETAMINOPHEN 325 MG/1
650 TABLET ORAL 3 TIMES DAILY
Status: DISCONTINUED | OUTPATIENT
Start: 2023-09-02 | End: 2023-09-04 | Stop reason: HOSPADM

## 2023-09-02 RX ORDER — ONDANSETRON 4 MG/1
4 TABLET, ORALLY DISINTEGRATING ORAL EVERY 6 HOURS PRN
Status: DISCONTINUED | OUTPATIENT
Start: 2023-09-02 | End: 2023-09-03

## 2023-09-02 RX ORDER — HYDROMORPHONE HYDROCHLORIDE 1 MG/ML
.3-.5 INJECTION, SOLUTION INTRAMUSCULAR; INTRAVENOUS; SUBCUTANEOUS
Status: DISCONTINUED | OUTPATIENT
Start: 2023-09-02 | End: 2023-09-04 | Stop reason: HOSPADM

## 2023-09-02 RX ORDER — ROSUVASTATIN CALCIUM 10 MG/1
20 TABLET, COATED ORAL EVERY EVENING
Status: DISCONTINUED | OUTPATIENT
Start: 2023-09-02 | End: 2023-09-04 | Stop reason: HOSPADM

## 2023-09-02 RX ORDER — SODIUM CHLORIDE 9 MG/ML
INJECTION, SOLUTION INTRAVENOUS CONTINUOUS
Status: DISCONTINUED | OUTPATIENT
Start: 2023-09-02 | End: 2023-09-04 | Stop reason: HOSPADM

## 2023-09-02 RX ORDER — ALBUTEROL SULFATE 90 UG/1
1-2 AEROSOL, METERED RESPIRATORY (INHALATION) EVERY 4 HOURS PRN
Status: ON HOLD | COMMUNITY
End: 2023-10-10

## 2023-09-02 RX ORDER — ONDANSETRON 2 MG/ML
4 INJECTION INTRAMUSCULAR; INTRAVENOUS ONCE
Status: COMPLETED | OUTPATIENT
Start: 2023-09-02 | End: 2023-09-02

## 2023-09-02 RX ORDER — METHOCARBAMOL 750 MG/1
750 TABLET, FILM COATED ORAL 3 TIMES DAILY
Status: DISCONTINUED | OUTPATIENT
Start: 2023-09-02 | End: 2023-09-04 | Stop reason: HOSPADM

## 2023-09-02 RX ORDER — ONDANSETRON 2 MG/ML
4 INJECTION INTRAMUSCULAR; INTRAVENOUS EVERY 6 HOURS PRN
Status: DISCONTINUED | OUTPATIENT
Start: 2023-09-02 | End: 2023-09-03

## 2023-09-02 RX ORDER — LIDOCAINE 40 MG/G
CREAM TOPICAL
Status: DISCONTINUED | OUTPATIENT
Start: 2023-09-02 | End: 2023-09-03

## 2023-09-02 RX ORDER — ROSUVASTATIN CALCIUM 20 MG/1
20 TABLET, COATED ORAL DAILY
COMMUNITY

## 2023-09-02 RX ORDER — VITAMIN B COMPLEX
25 TABLET ORAL DAILY
COMMUNITY

## 2023-09-02 RX ORDER — VITAMIN B COMPLEX
25 TABLET ORAL DAILY
Status: DISCONTINUED | OUTPATIENT
Start: 2023-09-03 | End: 2023-09-04 | Stop reason: HOSPADM

## 2023-09-02 RX ADMIN — SODIUM CHLORIDE 500 ML: 9 INJECTION, SOLUTION INTRAVENOUS at 15:33

## 2023-09-02 RX ADMIN — HYDROMORPHONE HYDROCHLORIDE 0.5 MG: 1 INJECTION, SOLUTION INTRAMUSCULAR; INTRAVENOUS; SUBCUTANEOUS at 18:07

## 2023-09-02 RX ADMIN — METHOCARBAMOL 750 MG: 750 TABLET, FILM COATED ORAL at 15:32

## 2023-09-02 RX ADMIN — ACETAMINOPHEN 650 MG: 325 TABLET ORAL at 16:27

## 2023-09-02 RX ADMIN — SODIUM CHLORIDE: 9 INJECTION, SOLUTION INTRAVENOUS at 21:06

## 2023-09-02 RX ADMIN — HYDROMORPHONE HYDROCHLORIDE 0.5 MG: 1 INJECTION, SOLUTION INTRAMUSCULAR; INTRAVENOUS; SUBCUTANEOUS at 14:58

## 2023-09-02 RX ADMIN — SODIUM CHLORIDE, POTASSIUM CHLORIDE, SODIUM LACTATE AND CALCIUM CHLORIDE 1000 ML: 600; 310; 30; 20 INJECTION, SOLUTION INTRAVENOUS at 13:30

## 2023-09-02 RX ADMIN — ONDANSETRON 4 MG: 2 INJECTION INTRAMUSCULAR; INTRAVENOUS at 13:37

## 2023-09-02 RX ADMIN — PANTOPRAZOLE SODIUM 40 MG: 40 INJECTION, POWDER, FOR SOLUTION INTRAVENOUS at 13:56

## 2023-09-02 RX ADMIN — PANTOPRAZOLE SODIUM 40 MG: 40 INJECTION, POWDER, FOR SOLUTION INTRAVENOUS at 22:22

## 2023-09-02 RX ADMIN — SODIUM CHLORIDE: 9 INJECTION, SOLUTION INTRAVENOUS at 16:43

## 2023-09-02 RX ADMIN — HYDROMORPHONE HYDROCHLORIDE 0.3 MG: 1 INJECTION, SOLUTION INTRAMUSCULAR; INTRAVENOUS; SUBCUTANEOUS at 20:32

## 2023-09-02 RX ADMIN — SODIUM CHLORIDE 500 ML: 9 INJECTION, SOLUTION INTRAVENOUS at 18:58

## 2023-09-02 ASSESSMENT — ACTIVITIES OF DAILY LIVING (ADL)
ADLS_ACUITY_SCORE: 35
ADLS_ACUITY_SCORE: 33
ADLS_ACUITY_SCORE: 35
ADLS_ACUITY_SCORE: 35

## 2023-09-02 NOTE — H&P
[facility] MEDICINE ADMISSION HISTORY AND PHYSICAL     Assessment & Plan       53 year old into [facility] on 9/2/2023 after arriving to the ER with  Melena.  PMHx includes C6-7 arthroplasty in 2015.  He has since   Developed a HNP at C5-6 along with spinal stenosis. Pt has been  Experiencing persistent left arm pain that is progressive in nature.    MRI 8/22 confirmed the findings.    He has been medicating himself with ibuprofen along with tramadol  And oxycodone.  He stopped the ibuprofen about 5 days ago due  To nausea and epigastric symptoms.    Last night he had melena along with this morning.  He feels bloated.    ER diagnostics show a hemoglobin of 10.3 which is down from 14.9 on  8.22.  He has a sinus tachycardia along with feeling persistently bloated.    On my interview at 1430 he is awake and alert complaining of left arm  Pain.  The GI consultant has been at the bedside.  He has an EGD  Tentatively planned for tomorrow.    Pt will be admitted; put on cardiac telemetry; serial hemoglobins,  Scheduled tylenol and robaxin along with as needed dilaudid. Pt  Understands reason for admission and agrees.       Problem list:     UGI bleed/melena:  ivf, telemetry, serial hemoglobins, protonix  Iv twice daily; NPO after midnight for EGD in AM  Anemia due to acute blood loss: serial hemoglobins, transfuse  If less than 7; telemetry;   Cervical radiculopathy, left arm due to HNP at C5-6 with stenosis:  Iv dilaudid as needed until EGD then transition to orals;   Schedule tylenol;  Leukocytosis due to acute blood loss: treat underlying cause  Dvt prevention:  ambulate          Chief Complaint melena         Past Medical History     Degenerative disc disease, cervical spine;     Surgical History     Left inguinal hernia surgery; colonoscopy     Family History    Reviewed, and No family history on file.     Social History        , (MSM),     Allergies     Allergies   Allergen  Reactions    Bupropion Hives and Other (See Comments)     Other reaction(s): psych problems    Milk (Cow) Other (See Comments)    Penicillins      Prior to Admission Medications      Prior to Admission Medications   Prescriptions Last Dose Informant Patient Reported? Taking?   Turmeric (QC TUMERIC COMPLEX PO) 9/1/2023  Yes Yes   Sig: Take 2 tablets by mouth daily   Vitamin D3 (VITAMIN D, CHOLECALCIFEROL,) 25 mcg (1000 units) tablet 9/1/2023  Yes Yes   Sig: Take 25 mcg by mouth daily   albuterol (PROAIR HFA/PROVENTIL HFA/VENTOLIN HFA) 108 (90 Base) MCG/ACT inhaler Past Month at can bring  Yes Yes   Sig: Inhale 1-2 puffs into the lungs every 4 hours as needed for shortness of breath, wheezing or cough   oxyCODONE (ROXICODONE) 5 MG tablet 9/2/2023 at AM  No Yes   Sig: Take 1 tablet (5 mg) by mouth every 6 hours as needed for pain   rosuvastatin (CRESTOR) 20 MG tablet 9/1/2023 at PM  Yes Yes   Sig: Take 20 mg by mouth daily      Facility-Administered Medications: None      Review of Systems     A 12 point comprehensive review of systems was negative except as noted above in HPI.    PHYSICAL EXAMINATION       Vitals      Temp:  [97  F (36.1  C)] 97  F (36.1  C)  Pulse:  [122] 122  Resp:  [20] 20  BP: (134)/(94) 134/94  SpO2:  [98 %] 98 %    Examination     GENERAL:  Alert, oriented; no distress;    HEAD:  Normocephalic, without obvious abnormality, atraumatic   EYES:  PERRL, conjunctiva/corneas clear, no scleral icterus, EOM's intact   NOSE: Nares normal, septum midline, mucosa normal, no drainage   THROAT: Lips, mucosa, and tongue normal; teeth and gums normal, mouth moist   NECK: Supple, symmetrical, trachea midline   BACK:   Symmetric, no curvature, ROM normal   LUNGS:   Clear to auscultation bilaterally, no rales, rhonchi, or wheezing, symmetric chest rise on inhalation, respirations unlabored   CHEST WALL:  No tenderness or deformity   HEART:  Regular rate and rhythm, S1 and S2 normal, no murmur, rub, or  gallop    ABDOMEN:   Soft, non-tender, bowel sounds active all four quadrants, no masses, no organomegaly, no rebound or guarding   EXTREMITIES: Extremities normal, atraumatic, no cyanosis or edema    SKIN: Dry to touch, no exanthems in the visualized areas   NEURO: Alert, oriented x 4, moves all four extremities freely, non-focal   PSYCH: Cooperative, behavior is appropriate              Advance Care Planning        Reyna William MD  [facility]   Phone: #[phone]

## 2023-09-02 NOTE — ED NOTES
Pt reports rxn to anesthesia given prior to previous colonoscopy. Does not recall what it was. Sticky note sent to treatment team.

## 2023-09-02 NOTE — PROGRESS NOTES
Hospitalist update:    No melanotic stools since 11 AM.     Pts follow up hemoglobin is 7.3  (down from 10.3 at 1330)  Heart rate of 118;  blood pressure 100/66    Pt agrees to a 2 unit packed red cell transfusion now    Pts follow up blood pressure is 113 systolic; HR of 114.    Discussed with [facility] (Dr Quevedo) who reviewed the  Case; continue with supportive care/transfusions now  And EGD in AM    I will repage if clinical status declines despite blood transfusion    ZULMA William.

## 2023-09-02 NOTE — ED TRIAGE NOTES
Pt arrives to ED with c/o black stools since this morning. Pt endorses to starting on oxycodone hydrochloride 5 mg tab yesterday as well for a herniated ruptured cervical disc. Also been taking Toradol at home which has also made him feel sick. Endorses to dizziness, weakness and fatigue.  requesting .      Triage Assessment       Row Name 09/02/23 1250       Triage Assessment (Adult)    Airway WDL WDL       Respiratory WDL    Respiratory WDL WDL       Skin Circulation/Temperature WDL    Skin Circulation/Temperature WDL WDL       Cardiac WDL    Cardiac WDL WDL       Peripheral/Neurovascular WDL    Peripheral Neurovascular WDL WDL       Cognitive/Neuro/Behavioral WDL    Cognitive/Neuro/Behavioral WDL WDL                     FLU+ , gen weakness on droplet precaution

## 2023-09-02 NOTE — CONSULTS
GI CONSULT NOTE    Name: Enmanuel aRndall  Medical Record #: [id number]  YOB: 1970  Date of Admission: 9/2/2023  Date/Time: 9/2/2023/2:34 PM    Patient seen with professional .    HISTORY OF PRESENT ILLNESS: We were asked to see Enmanuel Randall by Dr. Brady for melena and presyncope. Enmanuel Randall is a 53 year old year old male with history of previous cervical discectomy, otherwise good health.  He began developing left neck and arm tingling/pain and left face discomfort several weeks ago.  He was seen by neurosurgery, repair of stenosis in the cervical spine was planned.  For pain control, he has been taking alternating acetaminophen and ibuprofen up to 2.4 g a day for the past weeks.  2 days ago he began having a rumbling discomfort in his upper abdomen.  Yesterday he had a large dark bowel movement.  Today he had another episode of that with lightheadedness.  He has had presyncope but no passing out.  No shortness of breath, chest pain.  He has been sweaty, and generally uncomfortable.  No other blood thinners.  No previous episodes of such symptoms.  Minimal alcohol.  No liver disease.  No family history of GI bleeding.    REVIEW OF SYSTEMS (ROS): Complete review of systems negative other than listed in HPI.    PAST MEDICAL HISTORY:  No past medical history on file.     FAMILY HISTORY:  No family history on file.    SOCIAL HISTORY:  Social History     Socioeconomic History    Marital status:      Spouse name: Not on file    Number of children: Not on file    Years of education: Not on file    Highest education level: Not on file   Occupational History    Not on file   Tobacco Use    Smoking status: Not on file    Smokeless tobacco: Not on file   Substance and Sexual Activity    Alcohol use: Not on file    Drug use: Not on file    Sexual activity: Not on file   Other Topics Concern    Not on file   Social History Narrative    Not on file     Social Determinants of Health      Financial Resource Strain: Not on file   Food Insecurity: Not on file   Transportation Needs: Not on file   Physical Activity: Not on file   Stress: Not on file   Social Connections: Not on file   Intimate Partner Violence: Not on file   Housing Stability: Not on file     MEDICATIONS PRIOR TO ADMISSION: (Not in a hospital admission)       ALLERGIES: Bupropion, Milk (cow), and Penicillins    PHYSICAL EXAM:    BP (!) 134/94   Pulse (!) 122   Temp 97  F (36.1  C) (Temporal)   Resp 20   Ht 1.829 m (6')   Wt 84.8 kg (187 lb)   SpO2 98%   BMI 25.36 kg/m      GENERAL: Pleasant, no obvious distress  NECK: Supple without adenopathy  EYES: No scleral icterus  LUNGS: Clear to auscultation bilaterally  HEART: Regular rate and rhythm, S1 and S2 present, no lower extremity edema  ABDOMEN: Soft, nondistended.  Tenderness to palpation in midepigastrium and left upper quadrant, no guarding or rebound.  MUSKULOSKELETAL:  Warm and well perfused, moves all extremities well  SKIN: No jaundice  NEUROLOGIC: Alert and oriented  PSYCHIATRIC: Normal affect    LAB DATA:  CMP Results:   Recent Labs   Lab Test 09/02/23  1325 08/22/23  1436    139   POTASSIUM 3.9 4.3   CHLORIDE 109* 103   CO2 19* 27   ANIONGAP 10 9    99   BUN 30* 16.0   CR 0.77 0.98   BILITOTAL 0.4  --    ALKPHOS 58  --    ALT 18  --    AST 19  --       CBC  Recent Labs   Lab 09/02/23  1319   WBC 10.4   RBC 3.50*   HGB 10.3*   HCT 31.1*   MCV 89   MCH 29.4   MCHC 33.1   RDW 13.3        INR  Recent Labs   Lab 09/02/23  1319   INR 0.95      No results found for: LIPASE    IMAGING:  None    ASSESSMENT:  53 year old male with several weeks of high-dose NSAID use presents with melena and midepigastric discomfort.  This most likely represents peptic ulcer disease.  Neoplasm, Dieulefoy, varices in differential but much less likely.  With elevation of BUN to creatinine ratio, less likely colonic source.    PLAN:  IV PPI twice daily.  Clears, n.p.o. at  midnight  IV fluids.  Type and cross.  Avoid NSAIDs or anticoagulants.  Follow every 6 hour hemoglobin, transfuse for goal of 7 or symptoms.  EGD tomorrow morning.    Discussed with Dr. Brady.     TIME SPENT: 35 min including chart review, patient interview and care coordination.                                                Marco Leyva MD  Thank you for the opportunity to participate in the care of this patient.   Please feel free to call me with any questions or concerns.  Phone number [phone].

## 2023-09-02 NOTE — PHARMACY-ADMISSION MEDICATION HISTORY
Pharmacy Intern Admission Medication History    Admission medication history is complete. The information provided in this note is only as accurate as the sources available at the time of the update.    Medication reconciliation/reorder completed by provider prior to medication history? No    Information Source(s): Patient, Family member, and CareEverywhere/SureScripts via in-person  Spouse     Pertinent Information: Used to take baby aspirin. Had a doctors visit on Thursday and was told to stop taking so patient stopped. Last dose was Wednesday 8/30/23    Changes made to PTA medication list:  Added: vitamin D 1000 international unit(s), tumeric, rosuvastatin, albuterol  Deleted: methylprednisolone dosepak,   Changed: None    Medication Affordability:  Not including over the counter (OTC) medications, was there a time in the past 3 months when you did not take your medications as prescribed because of cost?: No    Allergies reviewed with patient and updates made in EHR: yes    Medication History Completed By: Jyoti Loredo 9/2/2023 2:00 PM    Prior to Admission medications    Medication Sig Last Dose Taking? Auth Provider Long Term End Date   albuterol (PROAIR HFA/PROVENTIL HFA/VENTOLIN HFA) 108 (90 Base) MCG/ACT inhaler Inhale 1-2 puffs into the lungs every 4 hours as needed for shortness of breath, wheezing or cough Past Month at can bring Yes Unknown, Entered By History Yes    oxyCODONE (ROXICODONE) 5 MG tablet Take 1 tablet (5 mg) by mouth every 6 hours as needed for pain 9/2/2023 at AM Yes Jered Salvador MD     rosuvastatin (CRESTOR) 20 MG tablet Take 20 mg by mouth daily 9/1/2023 at PM Yes Unknown, Entered By History Yes    Turmeric (QC TUMERIC COMPLEX PO) Take 2 tablets by mouth daily 9/1/2023 Yes Unknown, Entered By History     Vitamin D3 (VITAMIN D, CHOLECALCIFEROL,) 25 mcg (1000 units) tablet Take 25 mcg by mouth daily 9/1/2023 Yes Unknown, Entered By History

## 2023-09-02 NOTE — ED PROVIDER NOTES
Emergency Department Encounter         FINAL IMPRESSION:  Upper GI bleed, anemia        ED COURSE AND MEDICAL DECISION MAKING       ED Course as of 09/02/23 1406   Sat Sep 02, 2023   1304 EKG is sinus tach 117, no signs acute ischemia, no inversions no depressions QTc is 454, unchanged from previous EKG which was in August.   1345 Patient is a 53-year male with a history of neck pain with a scheduled surgery in the next few weeks, here with 2 episodes of melena.  1 last night and one this morning.  States after began feeling very lightheaded and fatigued.  No fevers or chills.  No nausea vomiting.  No dysuria.  No history of GI bleeds or GI issues in the past.  Has been taking large amount of ibuprofen for pain over the last week or so.  On arrival here he looks well, nontoxic however tachycardic and sweaty.  His abdomen is benign and he denies any abdominal pain.  Heart and lungs are otherwise normal.  Plan for labs fluids PPI reevaluate.    On laboratory evaluation patient had has a drop of almost 5 g from 11 days ago.  He will be admitted.  We will call GI.     -Discussed case with hospitalist.  Discussed case with GI.  Plan for admission.  2 units ordered that are being held downstairs.     1:10 PM I met with the patient, obtained history, performed an initial exam, and discussed options and plan for diagnostics and treatment here in the ED.   2:10 PM I spoke with GI, Dr. Miller.  2:12 PM I spoke with hospitalist, Dr. William, who accepts the patient for admission.              Medical Decision Making    History:  Supplemental history from: Documented in chart, if applicable  External Record(s) reviewed: Documented in chart, if applicable.    Work Up:  Chart documentation includes differential considered and any EKGs or imaging independently interpreted by provider, where specified.  In additional to work up documented, I considered the following work up: Documented in chart, if applicable.    External  consultation:  Discussion of management with another provider: Hospitalist and Other: GI    Complicating factors:  Care impacted by chronic illness: N/A  Care affected by social determinants of health: N/A    Disposition considerations: Admit.                  Critical Care     Performed by: Otilio Brady or    Authorized by: Otilio Brady  Total critical care time:  minutes  Critical care was necessary to treat or prevent imminent or life-threatening deterioration of the following conditions:   Critical care was time spent personally by me on the following activities: development of treatment plan with patient or surrogate, discussions with consultants, examination of patient, evaluation of patient's response to treatment, obtaining history from patient or surrogate, ordering and performing treatments and interventions, ordering and review of laboratory studies, ordering and review of radiographic studies, re-evaluation of patient's condition and monitoring for potential decompensation.  Critical care time was exclusive of separately billable procedures and treating other patients.'    At the conclusion of the encounter I discussed the results of all the tests and the disposition. The questions were answered. The patient or family acknowledged understanding and was agreeable with the care plan.                  MEDICATIONS GIVEN IN THE EMERGENCY DEPARTMENT:  Medications   lactated ringers BOLUS 1,000 mL (1,000 mLs Intravenous $New Bag 9/2/23 1330)   ondansetron (ZOFRAN) injection 4 mg (4 mg Intravenous $Given 9/2/23 1337)   pantoprazole (PROTONIX) IV push injection 40 mg (40 mg Intravenous $Given 9/2/23 1356)       NEW PRESCRIPTIONS STARTED AT TODAY'S ED VISIT:  New Prescriptions    No medications on file       HPI     Patient information obtained from: Patient    Use of : Yes (in person) Language: RUBIO    Enmanuel Randall is a 53 year old male with a pertinent history of dyslipidemia and herniated ruptured cervical  "disc who presents to this ED via walk-in with his partner and son for evaluation of melena.    Patient reports he has been waking up at 3 AM every day for the past two weeks to manage his pain from a ruptured disc in his neck. Tramadol has been making him feel nauseous, and his oxycodone has been making him feel \"loopy.\" Patient describes it as a constant shooting pain in his neck. He has an upcoming surgery on 10/10/23 with Dr. Salvador, neurosurgery. He reports constant ringing in his right ear since this morning with dizziness. He took tylenol without relief. He endorses he felt fine last night. Patient reports that he has had two episodes of black stool, once last night and once this morning. He denies taking Pepto Bismol recently. Patient endorses taking ibuprofen every four hours for the past week.He endorses trouble breathing. No abdominal pain, chest pain, fevers, or incontinence of bowel or bladder. Patient takes aspirin daily with multivitamins, tumeric, and statin. No other current complaints.      REVIEW OF SYSTEMS:  Review of Systems   Constitutional: Negative for fever, malaise  HEENT: Negative runny nose, sore throat. Positive for ringing in ear (right).  Respiratory: Negative for cough, congestion. Positive for trouble breathing.  Cardiovascular: Negative for chest pain, leg edema  Gastrointestinal: Negative for abdominal distention, abdominal pain, constipation, vomiting, diarrhea, incontinence. Positive for melena and nausea.  Genitourinary: Negative for dysuria and hematuria, incontinence.   Integument: Negative for rash, skin breakdown  Neurological: Negative for paresthesias, weakness, headache. Positive for dizziness.  Musculoskeletal: Negative for joint pain, joint swelling. Positive for neck pain.      All other systems reviewed and are negative.          MEDICAL HISTORY     No past medical history on file.    No past surgical history on file.         albuterol (PROAIR HFA/PROVENTIL HFA/VENTOLIN " HFA) 108 (90 Base) MCG/ACT inhaler  oxyCODONE (ROXICODONE) 5 MG tablet  rosuvastatin (CRESTOR) 20 MG tablet  Turmeric (QC TUMERIC COMPLEX PO)  Vitamin D3 (VITAMIN D, CHOLECALCIFEROL,) 25 mcg (1000 units) tablet            PHYSICAL EXAM     BP (!) 134/94   Pulse (!) 122   Temp 97  F (36.1  C) (Temporal)   Resp 20   Wt 84.8 kg (187 lb)   SpO2 98%       PHYSICAL EXAM:     General: Patient appears well, nontoxic, comfortable. Diaphoretic.  HEENT: Moist mucous membranes,  No head trauma.    Cardiovascular: Tachycardic. Normal rhythm, no extremity edema.  No appreciable murmur.  Respiratory: No signs of respiratory distress, lungs are clear to auscultation bilaterally with no wheezes rhonchi or rales.  Abdominal: Soft, nontender, nondistended, no palpable masses, no guarding, no rebound  Musculoskeletal: Full range of motion of joints, no deformities appreciated.  Neurological: Alert and oriented, grossly neurologically intact.  Psychological: Normal affect and mood.  Integument: No rashes appreciated           RESULTS       Labs Ordered and Resulted from Time of ED Arrival to Time of ED Departure   COMPREHENSIVE METABOLIC PANEL - Abnormal       Result Value    Sodium 138      Potassium 3.9      Chloride 109 (*)     Carbon Dioxide (CO2) 19 (*)     Anion Gap 10      Urea Nitrogen 30 (*)     Creatinine 0.77      Calcium 7.8 (*)     Glucose 103      Alkaline Phosphatase 58      AST 19      ALT 18      Protein Total 5.9 (*)     Albumin 3.3 (*)     Bilirubin Total 0.4      GFR Estimate >90     CBC WITH PLATELETS AND DIFFERENTIAL - Abnormal    WBC Count 10.4      RBC Count 3.50 (*)     Hemoglobin 10.3 (*)     Hematocrit 31.1 (*)     MCV 89      MCH 29.4      MCHC 33.1      RDW 13.3      Platelet Count 271      % Neutrophils 63      % Lymphocytes 29      % Monocytes 4      % Eosinophils 2      % Basophils 1      % Immature Granulocytes 1      NRBCs per 100 WBC 0      Absolute Neutrophils 6.4      Absolute Lymphocytes 3.1       Absolute Monocytes 0.5      Absolute Eosinophils 0.2      Absolute Basophils 0.1      Absolute Immature Granulocytes 0.1      Absolute NRBCs 0.0     INR - Normal    INR 0.95     PARTIAL THROMBOPLASTIN TIME - Normal    aPTT 23     TROPONIN I - Normal    Troponin I 0.02     TYPE AND SCREEN, ADULT    ABO/RH(D) O POS      SPECIMEN EXPIRATION DATE [id number]     PREPARE RED BLOOD CELLS (UNIT)   RED BLOOD CELLS HAVE AVAILABLE (UNIT)   ABO/RH TYPE AND SCREEN       No orders to display             PROCEDURES:  Procedures:  Procedures       I, Jessica Chahal am serving as a scribe to document services personally performed by Otilio Brady DO, based on my observations and the provider's statements to me.  I, Otilio Brady DO, attest that Jessica Chahal is acting in a scribe capacity, has observed my performance of the services and has documented them in accordance with my direction.    Otilio Brady DO  Emergency Medicine  [facility] EMERGENCY ROOM       Otilio Brady DO  09/02/23 [id number]

## 2023-09-03 ENCOUNTER — ANESTHESIA (OUTPATIENT)
Dept: SURGERY | Facility: CLINIC | Age: 53
DRG: 378 | End: 2023-09-03
Payer: COMMERCIAL

## 2023-09-03 LAB
ANION GAP SERPL CALCULATED.3IONS-SCNC: 3 MMOL/L (ref 5–18)
BUN SERPL-MCNC: 34 MG/DL (ref 8–22)
CALCIUM SERPL-MCNC: 7.1 MG/DL (ref 8.5–10.5)
CHLORIDE BLD-SCNC: 111 MMOL/L (ref 98–107)
CO2 SERPL-SCNC: 23 MMOL/L (ref 22–31)
CREAT SERPL-MCNC: 0.72 MG/DL (ref 0.7–1.3)
ERYTHROCYTE [DISTWIDTH] IN BLOOD BY AUTOMATED COUNT: 13.5 % (ref 10–15)
GFR SERPL CREATININE-BSD FRML MDRD: >90 ML/MIN/1.73M2
GLUCOSE BLD-MCNC: 100 MG/DL (ref 70–125)
HCT VFR BLD AUTO: 22.7 % (ref 40–53)
HGB BLD-MCNC: 7.9 G/DL (ref 13.3–17.7)
HGB BLD-MCNC: 8 G/DL (ref 13.3–17.7)
MCH RBC QN AUTO: 32.1 PG (ref 26.5–33)
MCHC RBC AUTO-ENTMCNC: 35.2 G/DL (ref 31.5–36.5)
MCV RBC AUTO: 91 FL (ref 78–100)
PLATELET # BLD AUTO: 175 10E3/UL (ref 150–450)
POTASSIUM BLD-SCNC: 3.9 MMOL/L (ref 3.5–5)
RBC # BLD AUTO: 2.49 10E6/UL (ref 4.4–5.9)
SODIUM SERPL-SCNC: 137 MMOL/L (ref 136–145)
UPPER GI ENDOSCOPY: NORMAL
WBC # BLD AUTO: 9.6 10E3/UL (ref 4–11)

## 2023-09-03 PROCEDURE — 272N000001 HC OR GENERAL SUPPLY STERILE: Performed by: INTERNAL MEDICINE

## 2023-09-03 PROCEDURE — 0W3P8ZZ CONTROL BLEEDING IN GASTROINTESTINAL TRACT, VIA NATURAL OR ARTIFICIAL OPENING ENDOSCOPIC: ICD-10-PCS | Performed by: INTERNAL MEDICINE

## 2023-09-03 PROCEDURE — 85027 COMPLETE CBC AUTOMATED: CPT | Performed by: EMERGENCY MEDICINE

## 2023-09-03 PROCEDURE — 250N000011 HC RX IP 250 OP 636: Mod: JZ | Performed by: EMERGENCY MEDICINE

## 2023-09-03 PROCEDURE — 120N000001 HC R&B MED SURG/OB

## 2023-09-03 PROCEDURE — 250N000011 HC RX IP 250 OP 636: Performed by: NURSE ANESTHETIST, CERTIFIED REGISTERED

## 2023-09-03 PROCEDURE — 85018 HEMOGLOBIN: CPT | Performed by: EMERGENCY MEDICINE

## 2023-09-03 PROCEDURE — 80048 BASIC METABOLIC PNL TOTAL CA: CPT | Performed by: EMERGENCY MEDICINE

## 2023-09-03 PROCEDURE — 99232 SBSQ HOSP IP/OBS MODERATE 35: CPT | Performed by: EMERGENCY MEDICINE

## 2023-09-03 PROCEDURE — C9113 INJ PANTOPRAZOLE SODIUM, VIA: HCPCS | Mod: JZ | Performed by: EMERGENCY MEDICINE

## 2023-09-03 PROCEDURE — 250N000009 HC RX 250: Performed by: NURSE ANESTHETIST, CERTIFIED REGISTERED

## 2023-09-03 PROCEDURE — 250N000011 HC RX IP 250 OP 636: Mod: JZ | Performed by: INTERNAL MEDICINE

## 2023-09-03 PROCEDURE — 360N000075 HC SURGERY LEVEL 2, PER MIN: Performed by: INTERNAL MEDICINE

## 2023-09-03 PROCEDURE — 0DB68ZX EXCISION OF STOMACH, VIA NATURAL OR ARTIFICIAL OPENING ENDOSCOPIC, DIAGNOSTIC: ICD-10-PCS | Performed by: INTERNAL MEDICINE

## 2023-09-03 PROCEDURE — 36415 COLL VENOUS BLD VENIPUNCTURE: CPT | Performed by: EMERGENCY MEDICINE

## 2023-09-03 PROCEDURE — 250N000013 HC RX MED GY IP 250 OP 250 PS 637: Performed by: ANESTHESIOLOGY

## 2023-09-03 PROCEDURE — 88342 IMHCHEM/IMCYTCHM 1ST ANTB: CPT | Mod: 26 | Performed by: PATHOLOGY

## 2023-09-03 PROCEDURE — 88305 TISSUE EXAM BY PATHOLOGIST: CPT | Mod: 26 | Performed by: PATHOLOGY

## 2023-09-03 PROCEDURE — 258N000003 HC RX IP 258 OP 636: Performed by: EMERGENCY MEDICINE

## 2023-09-03 PROCEDURE — 258N000003 HC RX IP 258 OP 636: Performed by: NURSE ANESTHETIST, CERTIFIED REGISTERED

## 2023-09-03 PROCEDURE — 88305 TISSUE EXAM BY PATHOLOGIST: CPT | Mod: TC | Performed by: INTERNAL MEDICINE

## 2023-09-03 PROCEDURE — 3E0G8GC INTRODUCTION OF OTHER THERAPEUTIC SUBSTANCE INTO UPPER GI, VIA NATURAL OR ARTIFICIAL OPENING ENDOSCOPIC: ICD-10-PCS | Performed by: INTERNAL MEDICINE

## 2023-09-03 PROCEDURE — 258N000003 HC RX IP 258 OP 636: Performed by: ANESTHESIOLOGY

## 2023-09-03 PROCEDURE — 250N000013 HC RX MED GY IP 250 OP 250 PS 637: Performed by: EMERGENCY MEDICINE

## 2023-09-03 PROCEDURE — 370N000017 HC ANESTHESIA TECHNICAL FEE, PER MIN: Performed by: INTERNAL MEDICINE

## 2023-09-03 RX ORDER — EPINEPHRINE 0.1 MG/ML
INJECTION INTRAVENOUS PRN
Status: DISCONTINUED | OUTPATIENT
Start: 2023-09-03 | End: 2023-09-03 | Stop reason: HOSPADM

## 2023-09-03 RX ORDER — LIDOCAINE HYDROCHLORIDE 10 MG/ML
INJECTION, SOLUTION INFILTRATION; PERINEURAL PRN
Status: DISCONTINUED | OUTPATIENT
Start: 2023-09-03 | End: 2023-09-03

## 2023-09-03 RX ORDER — NALOXONE HYDROCHLORIDE 0.4 MG/ML
0.2 INJECTION, SOLUTION INTRAMUSCULAR; INTRAVENOUS; SUBCUTANEOUS
Status: DISCONTINUED | OUTPATIENT
Start: 2023-09-03 | End: 2023-09-04 | Stop reason: HOSPADM

## 2023-09-03 RX ORDER — ONDANSETRON 4 MG/1
4 TABLET, ORALLY DISINTEGRATING ORAL EVERY 30 MIN PRN
Status: DISCONTINUED | OUTPATIENT
Start: 2023-09-03 | End: 2023-09-04 | Stop reason: HOSPADM

## 2023-09-03 RX ORDER — OXYCODONE HYDROCHLORIDE 5 MG/1
10 TABLET ORAL
Status: COMPLETED | OUTPATIENT
Start: 2023-09-03 | End: 2023-09-03

## 2023-09-03 RX ORDER — ONDANSETRON 4 MG/1
4 TABLET, ORALLY DISINTEGRATING ORAL EVERY 30 MIN PRN
Status: DISCONTINUED | OUTPATIENT
Start: 2023-09-03 | End: 2023-09-03

## 2023-09-03 RX ORDER — NALOXONE HYDROCHLORIDE 0.4 MG/ML
0.4 INJECTION, SOLUTION INTRAMUSCULAR; INTRAVENOUS; SUBCUTANEOUS
Status: DISCONTINUED | OUTPATIENT
Start: 2023-09-03 | End: 2023-09-04 | Stop reason: HOSPADM

## 2023-09-03 RX ORDER — ONDANSETRON 2 MG/ML
4 INJECTION INTRAMUSCULAR; INTRAVENOUS EVERY 30 MIN PRN
Status: DISCONTINUED | OUTPATIENT
Start: 2023-09-03 | End: 2023-09-04 | Stop reason: HOSPADM

## 2023-09-03 RX ORDER — OXYCODONE HYDROCHLORIDE 5 MG/1
5 TABLET ORAL
Status: DISCONTINUED | OUTPATIENT
Start: 2023-09-03 | End: 2023-09-04 | Stop reason: HOSPADM

## 2023-09-03 RX ORDER — SODIUM CHLORIDE, SODIUM LACTATE, POTASSIUM CHLORIDE, CALCIUM CHLORIDE 600; 310; 30; 20 MG/100ML; MG/100ML; MG/100ML; MG/100ML
INJECTION, SOLUTION INTRAVENOUS CONTINUOUS
Status: DISCONTINUED | OUTPATIENT
Start: 2023-09-03 | End: 2023-09-03

## 2023-09-03 RX ORDER — ONDANSETRON 2 MG/ML
4 INJECTION INTRAMUSCULAR; INTRAVENOUS EVERY 30 MIN PRN
Status: DISCONTINUED | OUTPATIENT
Start: 2023-09-03 | End: 2023-09-03

## 2023-09-03 RX ORDER — DIPHENHYDRAMINE HYDROCHLORIDE 50 MG/ML
25 INJECTION INTRAMUSCULAR; INTRAVENOUS ONCE
Status: COMPLETED | OUTPATIENT
Start: 2023-09-03 | End: 2023-09-03

## 2023-09-03 RX ORDER — LIDOCAINE 40 MG/G
CREAM TOPICAL
Status: DISCONTINUED | OUTPATIENT
Start: 2023-09-03 | End: 2023-09-03

## 2023-09-03 RX ORDER — PROPOFOL 10 MG/ML
INJECTION, EMULSION INTRAVENOUS CONTINUOUS PRN
Status: DISCONTINUED | OUTPATIENT
Start: 2023-09-03 | End: 2023-09-03

## 2023-09-03 RX ORDER — SODIUM CHLORIDE, SODIUM LACTATE, POTASSIUM CHLORIDE, CALCIUM CHLORIDE 600; 310; 30; 20 MG/100ML; MG/100ML; MG/100ML; MG/100ML
INJECTION, SOLUTION INTRAVENOUS CONTINUOUS PRN
Status: DISCONTINUED | OUTPATIENT
Start: 2023-09-03 | End: 2023-09-03

## 2023-09-03 RX ORDER — SODIUM CHLORIDE, SODIUM LACTATE, POTASSIUM CHLORIDE, CALCIUM CHLORIDE 600; 310; 30; 20 MG/100ML; MG/100ML; MG/100ML; MG/100ML
INJECTION, SOLUTION INTRAVENOUS CONTINUOUS
Status: DISCONTINUED | OUTPATIENT
Start: 2023-09-03 | End: 2023-09-04

## 2023-09-03 RX ORDER — LIDOCAINE 40 MG/G
CREAM TOPICAL
Status: DISCONTINUED | OUTPATIENT
Start: 2023-09-03 | End: 2023-09-04 | Stop reason: HOSPADM

## 2023-09-03 RX ORDER — PROPOFOL 10 MG/ML
INJECTION, EMULSION INTRAVENOUS PRN
Status: DISCONTINUED | OUTPATIENT
Start: 2023-09-03 | End: 2023-09-03

## 2023-09-03 RX ADMIN — DIPHENHYDRAMINE HYDROCHLORIDE 25 MG: 50 INJECTION INTRAMUSCULAR; INTRAVENOUS at 09:26

## 2023-09-03 RX ADMIN — METHOCARBAMOL 750 MG: 750 TABLET, FILM COATED ORAL at 20:20

## 2023-09-03 RX ADMIN — METHOCARBAMOL 750 MG: 750 TABLET, FILM COATED ORAL at 14:23

## 2023-09-03 RX ADMIN — ROSUVASTATIN CALCIUM 20 MG: 10 TABLET, FILM COATED ORAL at 20:20

## 2023-09-03 RX ADMIN — OXYCODONE HYDROCHLORIDE 10 MG: 5 TABLET ORAL at 23:34

## 2023-09-03 RX ADMIN — SODIUM CHLORIDE: 9 INJECTION, SOLUTION INTRAVENOUS at 05:05

## 2023-09-03 RX ADMIN — HYDROMORPHONE HYDROCHLORIDE 0.3 MG: 1 INJECTION, SOLUTION INTRAMUSCULAR; INTRAVENOUS; SUBCUTANEOUS at 00:12

## 2023-09-03 RX ADMIN — PROPOFOL 20 MG: 10 INJECTION, EMULSION INTRAVENOUS at 08:26

## 2023-09-03 RX ADMIN — LIDOCAINE HYDROCHLORIDE 80 ML: 10 INJECTION, SOLUTION INFILTRATION; PERINEURAL at 08:16

## 2023-09-03 RX ADMIN — HYDROMORPHONE HYDROCHLORIDE 0.3 MG: 1 INJECTION, SOLUTION INTRAMUSCULAR; INTRAVENOUS; SUBCUTANEOUS at 20:19

## 2023-09-03 RX ADMIN — HYDROMORPHONE HYDROCHLORIDE 0.5 MG: 1 INJECTION, SOLUTION INTRAMUSCULAR; INTRAVENOUS; SUBCUTANEOUS at 17:18

## 2023-09-03 RX ADMIN — ACETAMINOPHEN 650 MG: 325 TABLET ORAL at 14:23

## 2023-09-03 RX ADMIN — PANTOPRAZOLE SODIUM 40 MG: 40 INJECTION, POWDER, FOR SOLUTION INTRAVENOUS at 09:37

## 2023-09-03 RX ADMIN — PANTOPRAZOLE SODIUM 40 MG: 40 INJECTION, POWDER, FOR SOLUTION INTRAVENOUS at 20:20

## 2023-09-03 RX ADMIN — HYDROMORPHONE HYDROCHLORIDE 0.3 MG: 1 INJECTION, SOLUTION INTRAMUSCULAR; INTRAVENOUS; SUBCUTANEOUS at 03:28

## 2023-09-03 RX ADMIN — ACETAMINOPHEN 650 MG: 325 TABLET ORAL at 20:20

## 2023-09-03 RX ADMIN — HYDROMORPHONE HYDROCHLORIDE 0.5 MG: 1 INJECTION, SOLUTION INTRAMUSCULAR; INTRAVENOUS; SUBCUTANEOUS at 11:08

## 2023-09-03 RX ADMIN — SODIUM CHLORIDE, POTASSIUM CHLORIDE, SODIUM LACTATE AND CALCIUM CHLORIDE: 600; 310; 30; 20 INJECTION, SOLUTION INTRAVENOUS at 23:31

## 2023-09-03 RX ADMIN — SODIUM CHLORIDE, POTASSIUM CHLORIDE, SODIUM LACTATE AND CALCIUM CHLORIDE: 600; 310; 30; 20 INJECTION, SOLUTION INTRAVENOUS at 14:23

## 2023-09-03 RX ADMIN — PROPOFOL 40 MG: 10 INJECTION, EMULSION INTRAVENOUS at 08:21

## 2023-09-03 RX ADMIN — SODIUM CHLORIDE, POTASSIUM CHLORIDE, SODIUM LACTATE AND CALCIUM CHLORIDE: 600; 310; 30; 20 INJECTION, SOLUTION INTRAVENOUS at 08:13

## 2023-09-03 RX ADMIN — PROPOFOL 30 MG: 10 INJECTION, EMULSION INTRAVENOUS at 08:18

## 2023-09-03 RX ADMIN — PROPOFOL 200 MCG/KG/MIN: 10 INJECTION, EMULSION INTRAVENOUS at 08:16

## 2023-09-03 ASSESSMENT — ACTIVITIES OF DAILY LIVING (ADL)
ADLS_ACUITY_SCORE: 35
ADLS_ACUITY_SCORE: 35
ADLS_ACUITY_SCORE: 38
ADLS_ACUITY_SCORE: 37
ADLS_ACUITY_SCORE: 39
ADLS_ACUITY_SCORE: 38
ADLS_ACUITY_SCORE: 37
ADLS_ACUITY_SCORE: 37
ADLS_ACUITY_SCORE: 39
ADLS_ACUITY_SCORE: 35
ADLS_ACUITY_SCORE: 37
ADLS_ACUITY_SCORE: 39

## 2023-09-03 NOTE — PLAN OF CARE
Goal Outcome Evaluation:      Plan of Care Reviewed With: patient    Overall Patient Progress: no changeOverall Patient Progress: no change    Alert and oriented x 4, able to make needs known, had a near syncope episode while on the toilet became diaphoretic and became too weak, was able to transfer patient from toilet to w/c then onto the bed, MD William was at bedside and aware of the situation, applied 1 L of O2 via NC for comfort, O2 saturation in mid 90s, denied SOB, on telemetry, read NSR x 3, had episodes of belching, c/o bloating and distension, reported unchanged with the bloating, c/o nausea but declined interventions, no BM or emesis, completed 2 units of blood, no reactions noted, NPO for EGD in AM

## 2023-09-03 NOTE — PROGRESS NOTES
[facility] MEDICINE PROGRESS NOTE      Summary: 53 year old male admitted on 9/2 after presenting to the ER  With melena and presyncope.  PMHx includes ongoing issues with left  Arm radiculopathy due to known cervical disc disease.  He was dosing  Frequently with ibuprofen.    Pts admission hemoglobin was 10.3.  It dropped by 3 points over 3 hours  To 7.3.  He was transfused with 2 units packed red blood cells; he  Remained tachycardic overnight.    EGD this morning showed a duodenal ulcer with a visible       Hospital day number 1    Problem list:     UGI bleed due to duodenal ulcer: EGD report reviewed; ulcer  With visible vessel; injected with epinephrine; clear liquids  This morning; advance if tolerates; check hemoglobin at  Noon; continue with PPI BID;     2.  Radiculopathy; left arm: outpatient follow up; support here   With robaxin, tylenol, sparse narcotics if needed  3.  Disposition: discharge tomorrow      [facility] Medicine  [facility]  Phone: #[phone]  Securely message with the Vocera Web Console (learn more here)  Text page via [employer] Paging/Directory     Interval History/Subjective:  feeling sleepy; just returned from    EGD procedure      Physical Exam/Objective:  Temp:  [97  F (36.1  C)-98.6  F (37  C)] 98.2  F (36.8  C)  Pulse:  [] 90  Resp:  [16-20] 18  BP: ()/(57-94) 110/70  SpO2:  [84 %-98 %] 96 %  Body mass index is 25.36 kg/m .    GENERAL:  Alert, appears comfortable, in no acute distress, appears stated age   HEAD:  Normocephalic, without obvious abnormality, atraumatic   EYES:  PERRL, conjunctiva/corneas clear, no scleral icterus, EOM's intact   NOSE: Nares normal, septum midline, mucosa normal, no drainage   THROAT: Lips, mucosa, and tongue normal; teeth and gums normal, mouth moist   NECK: Supple, symmetrical, trachea midline   BACK:   Symmetric, no curvature, ROM normal   LUNGS:   Clear to auscultation  bilaterally, no rales, rhonchi, or wheezing, symmetric chest rise on inhalation, respirations unlabored   CHEST WALL:  No tenderness or deformity   HEART:  Regular rate and rhythm, S1 and S2 normal, no murmur, rub, or gallop    ABDOMEN:   Soft, non-tender, bowel sounds active all four quadrants, no masses, no organomegaly, no rebound or guarding   EXTREMITIES: Extremities normal, atraumatic, no cyanosis or edema    SKIN: Dry to touch, no exanthems in the visualized areas   NEURO: Alert, oriented x3, moves all four extremities freely   PSYCH: Cooperative, behavior is appropriate      Data reviewed today: I personally reviewed all new medications, labs, imaging/diagnostics reports over the past 24 hours. Pertinent findings include:    Imaging:   No results found for this or any previous visit (from the past 24 hour(s)).    Labs:      Medications:   Personally Reviewed.  Medications    sodium chloride 125 mL/hr at 09/03/23 0505      sodium chloride 0.9%  500 mL Intravenous Once    acetaminophen  650 mg Oral TID    diphenhydrAMINE  25 mg Intravenous Once    methocarbamol  750 mg Oral TID    pantoprazole  40 mg Intravenous BID    rosuvastatin  20 mg Oral QPM    sodium chloride (PF)  3 mL Intracatheter Q8H    Vitamin D3  25 mcg Oral Daily

## 2023-09-03 NOTE — ANESTHESIA CARE TRANSFER NOTE
Patient: Enmanuel Randall    Procedure: Procedure(s):  ESOPHAGOGASTRODUODENOSCOPY with EPINEPHRINE INJECTION and CAUTERY and BIOPSY       Diagnosis: Upper GI bleed [K92.2]  Diagnosis Additional Information: No value filed.    Anesthesia Type:   MAC     Note:    Oropharynx: oropharynx clear of all foreign objects and spontaneously breathing  Level of Consciousness: awake  Oxygen Supplementation: face mask  Level of Supplemental Oxygen (L/min / FiO2): 8  Independent Airway: airway patency satisfactory and stable  Dentition: dentition unchanged  Vital Signs Stable: post-procedure vital signs reviewed and stable  Report to RN Given: handoff report given  Patient transferred to: Medical/Surgical Unit  Comments: RN transport to floor.  Handoff Report: Identifed the Patient, Identified the Reponsible Provider, Reviewed the pertinent medical history, Discussed the surgical course, Reviewed Intra-OP anesthesia mangement and issues during anesthesia, Set expectations for post-procedure period and Allowed opportunity for questions and acknowledgement of understanding            Electronically Signed By: ALEXANDRA HAY CRNA  September 3, 2023  8:40 AM

## 2023-09-03 NOTE — ANESTHESIA PREPROCEDURE EVALUATION
Anesthesia Pre-Procedure Evaluation    Patient: Enmanuel Randall   MRN: [id number] : 1970        Procedure : Procedure(s):  ESOPHAGOGASTRODUODENOSCOPY          No past medical history on file.   No past surgical history on file.   Allergies   Allergen Reactions     Bupropion Hives and Other (See Comments)     Other reaction(s): psych problems     Milk (Cow) Other (See Comments)     Penicillins       Social History     Tobacco Use     Smoking status: Not on file     Smokeless tobacco: Not on file   Substance Use Topics     Alcohol use: Not on file      Wt Readings from Last 1 Encounters:   23 84.8 kg (187 lb)        Anesthesia Evaluation   Pt has had prior anesthetic.     No history of anesthetic complications       ROS/MED HX  ENT/Pulmonary:  - neg pulmonary ROS     Neurologic:  - neg neurologic ROS     Cardiovascular:  - neg cardiovascular ROS     METS/Exercise Tolerance:     Hematologic:     (+)      anemia (8.0 S/P 2 u PRBC's),          Musculoskeletal:  - neg musculoskeletal ROS     GI/Hepatic:    (-) GERD   Renal/Genitourinary:  - neg Renal ROS     Endo:  - neg endo ROS     Psychiatric/Substance Use:  - neg psychiatric ROS     Infectious Disease:  - neg infectious disease ROS     Malignancy:       Other:          Physical Exam    Airway        Mallampati: II   TM distance: > 3 FB   Neck ROM: full     Respiratory Devices and Support         Dental       (+) Minor Abnormalities - some fillings, tiny chips      Cardiovascular          Rhythm and rate: regular and normal     Pulmonary           breath sounds clear to auscultation       OUTSIDE LABS:  CBC:   Lab Results   Component Value Date    WBC 9.6 2023    WBC 10.4 2023    HGB 8.0 (L) 2023    HGB 8.0 (L) 2023    HCT 22.7 (L) 2023    HCT 31.1 (L) 2023     2023     2023     BMP:   Lab Results   Component Value Date     2023     2023    POTASSIUM 3.9 2023     POTASSIUM 3.9 09/02/2023    CHLORIDE 111 (H) 09/03/2023    CHLORIDE 109 (H) 09/02/2023    CO2 23 09/03/2023    CO2 19 (L) 09/02/2023    BUN 34 (H) 09/03/2023    BUN 30 (H) 09/02/2023    CR 0.72 09/03/2023    CR 0.77 09/02/2023     09/03/2023     09/02/2023     COAGS:   Lab Results   Component Value Date    PTT 23 09/02/2023    INR 0.95 09/02/2023     POC: No results found for: BGM, HCG, HCGS  HEPATIC:   Lab Results   Component Value Date    ALBUMIN 3.3 (L) 09/02/2023    PROTTOTAL 5.9 (L) 09/02/2023    ALT 18 09/02/2023    AST 19 09/02/2023    ALKPHOS 58 09/02/2023    BILITOTAL 0.4 09/02/2023     OTHER:   Lab Results   Component Value Date    LUCIA 7.1 (L) 09/03/2023       Anesthesia Plan    ASA Status:  3       Anesthesia Type: MAC.              Consents    Anesthesia Plan(s) and associated risks, benefits, and realistic alternatives discussed. Questions answered and patient/representative(s) expressed understanding.     - Discussed:     - Discussed with:  Patient            Postoperative Care       PONV prophylaxis: Ondansetron (or other 5HT-3)     Comments:              Nathan Haley MD

## 2023-09-03 NOTE — ANESTHESIA POSTPROCEDURE EVALUATION
Patient: Enmanuel Randall    Procedure: Procedure(s):  ESOPHAGOGASTRODUODENOSCOPY with EPINEPHRINE INJECTION and CAUTERY and BIOPSY       Anesthesia Type:  MAC    Note:  Disposition: Inpatient   Postop Pain Control: Uneventful            Sign Out: Well controlled pain   PONV: No   Neuro/Psych: Uneventful            Sign Out: Acceptable/Baseline neuro status   Airway/Respiratory: Uneventful            Sign Out: Acceptable/Baseline resp. status   CV/Hemodynamics: Uneventful            Sign Out: Acceptable CV status; No obvious hypovolemia; No obvious fluid overload   Other NRE: NONE   DID A NON-ROUTINE EVENT OCCUR? No       Last vitals:  Vitals:    09/02/23 2325 09/03/23 0011 09/03/23 0330   BP: 130/65 123/59 128/73   Pulse: 86 89 79   Resp: 20 18 16   Temp: 36.8  C (98.3  F) 36.8  C (98.3  F) 36.8  C (98.3  F)   SpO2: 98% 96% 97%       Electronically Signed By: Nathan Haley MD  September 3, 2023  8:54 AM

## 2023-09-03 NOTE — PLAN OF CARE
Major Shift Events:  Pt A/oX4. VSS. Weaned off O2 to RA. Benadryl x1 gave for post anaesthesia rash. Diet advanced to clears-tolerating well. Pt up x2 post EGD. Pain managed w scheduled and PRN's.   Plan: Continue to monitor post EGD cauterization  For vital signs and complete assessments, please see documentation flowsheets.   Goal Outcome Evaluation:

## 2023-09-04 VITALS
TEMPERATURE: 99 F | DIASTOLIC BLOOD PRESSURE: 71 MMHG | HEIGHT: 72 IN | RESPIRATION RATE: 18 BRPM | SYSTOLIC BLOOD PRESSURE: 120 MMHG | WEIGHT: 187 LBS | BODY MASS INDEX: 25.33 KG/M2 | OXYGEN SATURATION: 93 % | HEART RATE: 79 BPM

## 2023-09-04 LAB
BLD PROD TYP BPU: NORMAL
BLD PROD TYP BPU: NORMAL
BLOOD COMPONENT TYPE: NORMAL
BLOOD COMPONENT TYPE: NORMAL
CODING SYSTEM: NORMAL
CODING SYSTEM: NORMAL
CROSSMATCH: NORMAL
CROSSMATCH: NORMAL
ERYTHROCYTE [DISTWIDTH] IN BLOOD BY AUTOMATED COUNT: 14.1 % (ref 10–15)
HCT VFR BLD AUTO: 20.4 % (ref 40–53)
HGB BLD-MCNC: 6.9 G/DL (ref 13.3–17.7)
HGB BLD-MCNC: 8.4 G/DL (ref 13.3–17.7)
ISSUE DATE AND TIME: NORMAL
ISSUE DATE AND TIME: NORMAL
MCH RBC QN AUTO: 30.8 PG (ref 26.5–33)
MCHC RBC AUTO-ENTMCNC: 33.8 G/DL (ref 31.5–36.5)
MCV RBC AUTO: 91 FL (ref 78–100)
PLATELET # BLD AUTO: 182 10E3/UL (ref 150–450)
RBC # BLD AUTO: 2.24 10E6/UL (ref 4.4–5.9)
UNIT ABO/RH: NORMAL
UNIT ABO/RH: NORMAL
UNIT NUMBER: NORMAL
UNIT NUMBER: NORMAL
UNIT STATUS: NORMAL
UNIT STATUS: NORMAL
UNIT TYPE ISBT: 5100
UNIT TYPE ISBT: 5100
WBC # BLD AUTO: 9.8 10E3/UL (ref 4–11)

## 2023-09-04 PROCEDURE — C9113 INJ PANTOPRAZOLE SODIUM, VIA: HCPCS | Mod: JZ | Performed by: EMERGENCY MEDICINE

## 2023-09-04 PROCEDURE — 36415 COLL VENOUS BLD VENIPUNCTURE: CPT | Performed by: EMERGENCY MEDICINE

## 2023-09-04 PROCEDURE — 250N000011 HC RX IP 250 OP 636: Mod: JZ | Performed by: EMERGENCY MEDICINE

## 2023-09-04 PROCEDURE — 85018 HEMOGLOBIN: CPT | Performed by: EMERGENCY MEDICINE

## 2023-09-04 PROCEDURE — 250N000013 HC RX MED GY IP 250 OP 250 PS 637: Performed by: EMERGENCY MEDICINE

## 2023-09-04 PROCEDURE — 99238 HOSP IP/OBS DSCHRG MGMT 30/<: CPT | Performed by: EMERGENCY MEDICINE

## 2023-09-04 PROCEDURE — P9016 RBC LEUKOCYTES REDUCED: HCPCS | Performed by: EMERGENCY MEDICINE

## 2023-09-04 PROCEDURE — 85027 COMPLETE CBC AUTOMATED: CPT | Performed by: EMERGENCY MEDICINE

## 2023-09-04 RX ORDER — PANTOPRAZOLE SODIUM 40 MG/1
40 TABLET, DELAYED RELEASE ORAL 2 TIMES DAILY
Qty: 60 TABLET | Refills: 0 | Status: SHIPPED | OUTPATIENT
Start: 2023-09-04 | End: 2023-09-04

## 2023-09-04 RX ORDER — METHOCARBAMOL 750 MG/1
750 TABLET, FILM COATED ORAL 3 TIMES DAILY
Qty: 40 TABLET | Refills: 0 | Status: SHIPPED | OUTPATIENT
Start: 2023-09-04 | End: 2023-09-04

## 2023-09-04 RX ORDER — METHOCARBAMOL 750 MG/1
750 TABLET, FILM COATED ORAL 3 TIMES DAILY
Qty: 40 TABLET | Refills: 0 | Status: ON HOLD | OUTPATIENT
Start: 2023-09-04 | End: 2023-10-10

## 2023-09-04 RX ORDER — PANTOPRAZOLE SODIUM 40 MG/1
40 TABLET, DELAYED RELEASE ORAL 2 TIMES DAILY
Qty: 60 TABLET | Refills: 0 | Status: SHIPPED | OUTPATIENT
Start: 2023-09-04

## 2023-09-04 RX ORDER — PANTOPRAZOLE SODIUM 40 MG/1
40 TABLET, DELAYED RELEASE ORAL 2 TIMES DAILY
Status: DISCONTINUED | OUTPATIENT
Start: 2023-09-04 | End: 2023-09-04 | Stop reason: HOSPADM

## 2023-09-04 RX ADMIN — METHOCARBAMOL 750 MG: 750 TABLET, FILM COATED ORAL at 13:40

## 2023-09-04 RX ADMIN — ACETAMINOPHEN 650 MG: 325 TABLET ORAL at 13:39

## 2023-09-04 RX ADMIN — ACETAMINOPHEN 650 MG: 325 TABLET ORAL at 08:23

## 2023-09-04 RX ADMIN — METHOCARBAMOL 750 MG: 750 TABLET, FILM COATED ORAL at 08:24

## 2023-09-04 RX ADMIN — Medication 25 MCG: at 08:24

## 2023-09-04 RX ADMIN — PANTOPRAZOLE SODIUM 40 MG: 40 INJECTION, POWDER, FOR SOLUTION INTRAVENOUS at 08:24

## 2023-09-04 ASSESSMENT — ACTIVITIES OF DAILY LIVING (ADL)
ADLS_ACUITY_SCORE: 38
ADLS_ACUITY_SCORE: 37
ADLS_ACUITY_SCORE: 38
ADLS_ACUITY_SCORE: 38

## 2023-09-04 NOTE — PLAN OF CARE
Goal Outcome Evaluation:  Problem: Gastrointestinal Bleeding  Goal: Optimal Coping with Acute Illness  Outcome: Progressing     Problem: Pain Acute  Goal: Optimal Pain Control and Function  Outcome: Progressing  Intervention: Develop Pain Management Plan  Intervention: Prevent or Manage Pain    Patient A&O x4, VSS. C/o shoulder and neck pain, scheduled pain meds given with some relief. Denies SOB or H/N/V. Pt received 2 units of PRBC today, tolerated well. Up with SBA.

## 2023-09-04 NOTE — PLAN OF CARE
Goal Outcome Evaluation:      Plan of Care Reviewed With: patient    Overall Patient Progress: improvingOverall Patient Progress: improving    Alert and oriented x4, able to make needs known, c/o neck and left shoulder, reported numbness to face, managed well with PRN IV Dilaudid and 10 mg of Oxy x1, denied nausea and bloating, reported passing flatus, no BM or emesis this shift, denied dizziness or lightheadedness, stand by assist with ambulation, tolerating clears well,  O2 desaturated to 88% while sleeping, required 1 L of O2, saturation in mid 90s.

## 2023-09-04 NOTE — DISCHARGE SUMMARY
[facility] MEDICINE  DISCHARGE SUMMARY     Primary Care Physician: Ignacio Miller  Admission Date: 9/2/2023   Discharge Provider: Reyna William MD Discharge Date: 9/4/2023   Diet:   Active Diet and Nourishment Order   Procedures    Regular Diet Adult    Diet       Code Status: Full Code   Activity: as tolerated        Condition at Discharge: stable, improved     REASON FOR PRESENTATION(See Admission Note for Details)     melena    PRINCIPAL & ACTIVE DISCHARGE DIAGNOSES      UGI bleed due to duodenal ulcer  Melena due to PUD  Cervical radiculopathy; left arm  Anemia due to acute blood loss  Sinus tachycardia due to acute blood loss    PENDING LABS     Unresulted Labs Ordered in the Past 30 Days of this Admission       No orders found from 8/3/2023 to 9/3/2023.              PROCEDURES ( this hospitalization only)      Procedure(s):  ESOPHAGOGASTRODUODENOSCOPY with EPINEPHRINE INJECTION and CAUTERY and BIOPSY    RECOMMENDATIONS TO OUTPATIENT PROVIDER FOR F/U VISIT     Follow-up Appointments     Follow-up and recommended labs and tests       Follow up primary care in 3 weeks                DISPOSITION         SUMMARY OF HOSPITAL COURSE:      53 year old female into [facility] on 9/2/2023 after presenting to the ER  With episodes of melena along with presyncopal event.  Pt has been experiencing significant left arm pain due to a known HNP at C5-6 causing  Him radiculopathy.  He has surgery planned for early October.  Pt had been  Taking a significant amount of ibuprofen for the pain.      On arrival to the ER he had a hemoglobin of 10.3  He had a sinus tachycardia  Though the blood pressures were stable.  He had no history of PUD, reflux,  Gastritis.    He was admitted for clinical support and GI consult.  He remained tachycardic  In spite of 1500 ml fluid bolus.  A follow up 3 hour hemoglobin was 7.3.  I called GI service who recommended continued clinical support. He had  An  EGD pending for the next day.  He received a 2 unit packed red cell  Transfusion.  He walked to the bathroom and was presyncopal.  He remained  Tachycardic.  The pressures were soft though stable.  He never had a   Recurrence of melena.  He felt constantly bloated.    EGD done on 9/3 showed a duodenal ulcer with a visible vessel.  It was  Injected.  The esophagus was normal.    Post procedure he felt significantly improved.  The diet was advanced.  The  Hemoglobin drifted to 6.9.  (mild).  Per further discussion we agreed on a  Second transfusion prior to discharge.  Pt tolerated. Follow up hemoglobin was  8.4.  He is safe for discharge.  Pt will follow with primary care at the end of the  Week for a follow up lab and further recommendations.     Discharge Medications with Med changes:     Current Discharge Medication List        START taking these medications    Details   methocarbamol (ROBAXIN) 750 MG tablet Take 1 tablet (750 mg) by mouth 3 times daily  Qty: 40 tablet, Refills: 0    Associated Diagnoses: Cervical radiculopathy      pantoprazole (PROTONIX) 40 MG EC tablet Take 1 tablet (40 mg) by mouth 2 times daily  Qty: 60 tablet, Refills: 0    Associated Diagnoses: Upper GI bleed           CONTINUE these medications which have NOT CHANGED    Details   albuterol (PROAIR HFA/PROVENTIL HFA/VENTOLIN HFA) 108 (90 Base) MCG/ACT inhaler Inhale 1-2 puffs into the lungs every 4 hours as needed for shortness of breath, wheezing or cough      oxyCODONE (ROXICODONE) 5 MG tablet Take 1 tablet (5 mg) by mouth every 6 hours as needed for pain  Qty: 25 tablet, Refills: 0    Associated Diagnoses: Spinal stenosis in cervical region      rosuvastatin (CRESTOR) 20 MG tablet Take 20 mg by mouth daily      Turmeric (QC TUMERIC COMPLEX PO) Take 2 tablets by mouth daily      Vitamin D3 (VITAMIN D, CHOLECALCIFEROL,) 25 mcg (1000 units) tablet Take 25 mcg by mouth daily                       Consults       GASTROENTEROLOGY IP  CONSULT    Immunizations given this encounter     Most Recent Immunizations   Administered Date(s) Administered    COVID-19 Bivalent 12+ (Pfizer) 09/08/2022    COVID-19 Monovalent 18+ (Moderna) 11/27/2021           Anticoagulation Information      Recent INR results:   Recent Labs   Lab 09/02/23  1319   INR 0.95     Warfarin doses (if applicable) or name of other anticoagulant:           Discharge Orders        Reason for your hospital stay    UGI bleed, duodenal ulcer     Follow-up and recommended labs and tests     Follow up primary care in 3 weeks     Activity    As tolerated  NO NSAID use     Diet    regular       Examination   Physical Exam   Temp:  [97.4  F (36.3  C)-98.6  F (37  C)] 98.6  F (37  C)  Pulse:  [73-93] 87  Resp:  [17-18] 18  BP: (100-126)/(58-80) 120/71  SpO2:  [88 %-100 %] 93 %  Wt Readings from Last 1 Encounters:   09/02/23 84.8 kg (187 lb)     GENERAL:  Alert, looks tired; no distress   HEAD:  Normocephalic, without obvious abnormality, atraumatic   EYES:  PERRL, conjunctiva/corneas clear, no scleral icterus, EOM's intact   NOSE: Nares normal, septum midline, mucosa normal, no drainage   THROAT: Lips, mucosa, and tongue normal; teeth and gums normal, mouth moist   NECK: Supple, symmetrical, trachea midline   BACK:   Symmetric, no curvature, ROM normal   LUNGS:   Clear to auscultation bilaterally, no rales, rhonchi, or wheezing, symmetric chest rise on inhalation, respirations unlabored   CHEST WALL:  No tenderness or deformity   HEART:  Regular rate and rhythm, S1 and S2 normal, no murmur, rub, or gallop    ABDOMEN:   Soft, non-tender, bowel sounds active all four quadrants, no masses, no organomegaly, no rebound or guarding   EXTREMITIES: Extremities normal, atraumatic, no cyanosis or edema    SKIN: Dry to touch, no exanthems in the visualized areas   NEURO: Alert, oriented x 4, moves all four extremities freely/spontaneously   PSYCH: Cooperative, behavior is appropriate             Please  see EMR for more detailed significant labs, imaging, consultant notes etc.        Reyna William MD  [facility]    CC:Ignacio Miller

## 2023-09-04 NOTE — PLAN OF CARE
Discharge AVS reviewed with patient and family. Questions answered and instructions acknowledged. Belongings and paperwork sent with via family transport.       Sandra Rubio RN on 9/4/2023 at 5:03 PM

## 2023-09-04 NOTE — PROGRESS NOTES
[facility] Brief Note    Stable, no bleeding, OK to advance diet and discharge today from GI perspective.  Will follow up h pylori bx  Thank you  ZAYRA

## 2023-09-04 NOTE — CONSULTS
Notified on call Uziel HAIR, about diet advancing.    MD Uziel Bee ordered to advance diet to clears.

## 2023-09-07 LAB
PATH REPORT.COMMENTS IMP SPEC: NORMAL
PATH REPORT.COMMENTS IMP SPEC: NORMAL
PATH REPORT.FINAL DX SPEC: NORMAL
PATH REPORT.GROSS SPEC: NORMAL
PATH REPORT.MICROSCOPIC SPEC OTHER STN: NORMAL
PATH REPORT.RELEVANT HX SPEC: NORMAL
PHOTO IMAGE: NORMAL

## 2023-10-09 ENCOUNTER — ANESTHESIA EVENT (OUTPATIENT)
Dept: SURGERY | Facility: CLINIC | Age: 53
End: 2023-10-09
Payer: COMMERCIAL

## 2023-10-09 RX ORDER — SILDENAFIL 100 MG/1
100 TABLET, FILM COATED ORAL DAILY PRN
COMMUNITY

## 2023-10-09 RX ORDER — TRAMADOL HYDROCHLORIDE 50 MG/1
50 TABLET ORAL EVERY 6 HOURS PRN
Status: ON HOLD | COMMUNITY
End: 2023-10-10

## 2023-10-09 RX ORDER — CYCLOBENZAPRINE HCL 10 MG
10 TABLET ORAL 3 TIMES DAILY PRN
Status: ON HOLD | COMMUNITY
End: 2023-10-10

## 2023-10-09 ASSESSMENT — LIFESTYLE VARIABLES: TOBACCO_USE: 1

## 2023-10-09 NOTE — ANESTHESIA PREPROCEDURE EVALUATION
Anesthesia Pre-Procedure Evaluation    Patient: Enmanuel Randall   MRN: [id number] : 1970        Procedure : Procedure(s):  Cervical 5 to cervical 6 arthroplasty          Past Medical History:   Diagnosis Date    Cervical radiculopathy     Duodenal ulcer 2023    Dyslipidemia     ED (erectile dysfunction)     Seborrheic dermatitis     Spinal stenosis in cervical region     Tobacco use disorder     Upper GI bleed       Past Surgical History:   Procedure Laterality Date    ESOPHAGOSCOPY, GASTROSCOPY, DUODENOSCOPY (EGD), COMBINED N/A 9/3/2023    Procedure: ESOPHAGOGASTRODUODENOSCOPY with EPINEPHRINE INJECTION and CAUTERY and BIOPSY;  Surgeon: Eliazar Leyva MD;  Location: [facility] Main OR      Allergies   Allergen Reactions    Bupropion Hives and Other (See Comments)     Other reaction(s): psych problems    Milk (Cow) Other (See Comments)    Penicillins Hives     Tolerated amoxicillin      Social History     Tobacco Use    Smoking status: Every Day     Packs/day: 1.00     Years: 10.00     Pack years: 10.00     Types: Cigarettes    Smokeless tobacco: Never   Substance Use Topics    Alcohol use: Yes     Comment: occ      Wt Readings from Last 1 Encounters:   23 84.8 kg (187 lb)        Anesthesia Evaluation   Pt has had prior anesthetic.     History of anesthetic complications   Rash with fentanyl; Dilaudid ok.    ROS/MED HX  ENT/Pulmonary:     (+)                tobacco use,                    (-) sleep apnea   Neurologic: Comment: Radiculopathy   (-) no CVA   Cardiovascular:     (+) Dyslipidemia - -   -  - -                                   (-) CAD   METS/Exercise Tolerance:     Hematologic:       Musculoskeletal:       GI/Hepatic: Comment: H/o upper GI bleed - duodenal ulcer   (-) GERD   Renal/Genitourinary:       Endo:    (-) Type II DM   Psychiatric/Substance Use:       Infectious Disease:       Malignancy:       Other:            Physical Exam    Airway        Mallampati: II   TM  distance: > 3 FB   Neck ROM: full   Mouth opening: > 3 cm    Respiratory Devices and Support         Dental  no notable dental history     (+) Minor Abnormalities - some fillings, tiny chips      Cardiovascular   cardiovascular exam normal          Pulmonary   pulmonary exam normal                OUTSIDE LABS:  CBC:   Lab Results   Component Value Date    WBC 9.8 09/04/2023    WBC 9.6 09/03/2023    HGB 8.4 (L) 09/04/2023    HGB 6.9 (LL) 09/04/2023    HCT 20.4 (L) 09/04/2023    HCT 22.7 (L) 09/03/2023     09/04/2023     09/03/2023     BMP:   Lab Results   Component Value Date     09/03/2023     09/02/2023    POTASSIUM 3.9 09/03/2023    POTASSIUM 3.9 09/02/2023    CHLORIDE 111 (H) 09/03/2023    CHLORIDE 109 (H) 09/02/2023    CO2 23 09/03/2023    CO2 19 (L) 09/02/2023    BUN 34 (H) 09/03/2023    BUN 30 (H) 09/02/2023    CR 0.72 09/03/2023    CR 0.77 09/02/2023     09/03/2023     09/02/2023     COAGS:   Lab Results   Component Value Date    PTT 23 09/02/2023    INR 0.95 09/02/2023     POC: No results found for: BGM, HCG, HCGS  HEPATIC:   Lab Results   Component Value Date    ALBUMIN 3.3 (L) 09/02/2023    PROTTOTAL 5.9 (L) 09/02/2023    ALT 18 09/02/2023    AST 19 09/02/2023    ALKPHOS 58 09/02/2023    BILITOTAL 0.4 09/02/2023     OTHER:   Lab Results   Component Value Date    LUCIA 7.1 (L) 09/03/2023       Anesthesia Plan    ASA Status:  2    NPO Status:  NPO Appropriate    Anesthesia Type: General.     - Airway: ETT   Induction: Propofol, Intravenous.   Maintenance: Balanced.   Techniques and Equipment:     - Airway: Video-Laryngoscope       - Drips/Meds: Dexmed. infusion     Consents    Anesthesia Plan(s) and associated risks, benefits, and realistic alternatives discussed. Questions answered and patient/representative(s) expressed understanding.     - Discussed:     - Discussed with:  Patient            Postoperative Care    Pain management: Multi-modal analgesia.   PONV  prophylaxis: Ondansetron (or other 5HT-3), Dexamethasone or Solumedrol     Comments:    Other Comments: No fentanyl.  Use dilaudid.              Eusebia Solares MD, MD

## 2023-10-10 ENCOUNTER — ANESTHESIA (OUTPATIENT)
Dept: SURGERY | Facility: CLINIC | Age: 53
End: 2023-10-10
Payer: COMMERCIAL

## 2023-10-10 ENCOUNTER — OFFICE VISIT (OUTPATIENT)
Dept: INTERPRETER SERVICES | Facility: CLINIC | Age: 53
End: 2023-10-10
Payer: COMMERCIAL

## 2023-10-10 ENCOUNTER — HOSPITAL ENCOUNTER (OUTPATIENT)
Facility: CLINIC | Age: 53
Discharge: HOME | End: 2023-10-11
Attending: NEUROLOGICAL SURGERY | Admitting: NEUROLOGICAL SURGERY
Payer: COMMERCIAL

## 2023-10-10 ENCOUNTER — APPOINTMENT (OUTPATIENT)
Dept: GENERAL RADIOLOGY | Facility: CLINIC | Age: 53
End: 2023-10-10
Attending: NEUROLOGICAL SURGERY
Payer: COMMERCIAL

## 2023-10-10 DIAGNOSIS — M48.02 SPINAL STENOSIS IN CERVICAL REGION: ICD-10-CM

## 2023-10-10 DIAGNOSIS — Z98.890 S/P CERVICAL DISC REPLACEMENT: Primary | ICD-10-CM

## 2023-10-10 LAB
ANION GAP SERPL CALCULATED.3IONS-SCNC: 14 MMOL/L (ref 7–15)
BUN SERPL-MCNC: 15.1 MG/DL (ref 6–20)
CALCIUM SERPL-MCNC: 8.9 MG/DL (ref 8.6–10)
CHLORIDE SERPL-SCNC: 100 MMOL/L (ref 98–107)
CREAT SERPL-MCNC: 0.95 MG/DL (ref 0.67–1.17)
DEPRECATED HCO3 PLAS-SCNC: 21 MMOL/L (ref 22–29)
EGFRCR SERPLBLD CKD-EPI 2021: >90 ML/MIN/1.73M2
ERYTHROCYTE [DISTWIDTH] IN BLOOD BY AUTOMATED COUNT: 13.1 % (ref 10–15)
GLUCOSE SERPL-MCNC: 222 MG/DL (ref 70–99)
HCT VFR BLD AUTO: 36.9 % (ref 40–53)
HGB BLD-MCNC: 12 G/DL (ref 13.3–17.7)
MCH RBC QN AUTO: 27.6 PG (ref 26.5–33)
MCHC RBC AUTO-ENTMCNC: 32.5 G/DL (ref 31.5–36.5)
MCV RBC AUTO: 85 FL (ref 78–100)
PLATELET # BLD AUTO: 305 10E3/UL (ref 150–450)
POTASSIUM SERPL-SCNC: 4.4 MMOL/L (ref 3.4–5.3)
RBC # BLD AUTO: 4.35 10E6/UL (ref 4.4–5.9)
SODIUM SERPL-SCNC: 135 MMOL/L (ref 135–145)
WBC # BLD AUTO: 12.3 10E3/UL (ref 4–11)

## 2023-10-10 PROCEDURE — 250N000011 HC RX IP 250 OP 636: Mod: JZ | Performed by: NURSE PRACTITIONER

## 2023-10-10 PROCEDURE — 85027 COMPLETE CBC AUTOMATED: CPT | Performed by: PHYSICIAN ASSISTANT

## 2023-10-10 PROCEDURE — 36415 COLL VENOUS BLD VENIPUNCTURE: CPT | Performed by: PHYSICIAN ASSISTANT

## 2023-10-10 PROCEDURE — 999N000141 HC STATISTIC PRE-PROCEDURE NURSING ASSESSMENT: Performed by: NEUROLOGICAL SURGERY

## 2023-10-10 PROCEDURE — 250N000013 HC RX MED GY IP 250 OP 250 PS 637: Performed by: NURSE PRACTITIONER

## 2023-10-10 PROCEDURE — 99205 OFFICE O/P NEW HI 60 MIN: CPT | Performed by: PHYSICIAN ASSISTANT

## 2023-10-10 PROCEDURE — 80048 BASIC METABOLIC PNL TOTAL CA: CPT | Performed by: PHYSICIAN ASSISTANT

## 2023-10-10 PROCEDURE — C1889 IMPLANT/INSERT DEVICE, NOC: HCPCS | Performed by: NEUROLOGICAL SURGERY

## 2023-10-10 PROCEDURE — 250N000011 HC RX IP 250 OP 636: Performed by: NEUROLOGICAL SURGERY

## 2023-10-10 PROCEDURE — 999N000179 XR SURGERY CARM FLUORO LESS THAN 5 MIN W STILLS

## 2023-10-10 PROCEDURE — 93010 ELECTROCARDIOGRAM REPORT: CPT | Performed by: INTERNAL MEDICINE

## 2023-10-10 PROCEDURE — 258N000003 HC RX IP 258 OP 636: Performed by: ANESTHESIOLOGY

## 2023-10-10 PROCEDURE — 272N000001 HC OR GENERAL SUPPLY STERILE: Performed by: NEUROLOGICAL SURGERY

## 2023-10-10 PROCEDURE — 250N000011 HC RX IP 250 OP 636: Mod: JZ | Performed by: ANESTHESIOLOGY

## 2023-10-10 PROCEDURE — T1013 SIGN LANG/ORAL INTERPRETER: HCPCS | Mod: U3

## 2023-10-10 PROCEDURE — 360N000084 HC SURGERY LEVEL 4 W/ FLUORO, PER MIN: Performed by: NEUROLOGICAL SURGERY

## 2023-10-10 PROCEDURE — 250N000009 HC RX 250: Performed by: NEUROLOGICAL SURGERY

## 2023-10-10 PROCEDURE — 250N000025 HC SEVOFLURANE, PER MIN: Performed by: NEUROLOGICAL SURGERY

## 2023-10-10 PROCEDURE — 370N000017 HC ANESTHESIA TECHNICAL FEE, PER MIN: Performed by: NEUROLOGICAL SURGERY

## 2023-10-10 PROCEDURE — 93005 ELECTROCARDIOGRAM TRACING: CPT | Mod: XU

## 2023-10-10 PROCEDURE — 258N000003 HC RX IP 258 OP 636: Performed by: PHYSICIAN ASSISTANT

## 2023-10-10 PROCEDURE — 250N000009 HC RX 250: Performed by: ANESTHESIOLOGY

## 2023-10-10 PROCEDURE — 710N000009 HC RECOVERY PHASE 1, LEVEL 1, PER MIN: Performed by: NEUROLOGICAL SURGERY

## 2023-10-10 PROCEDURE — 250N000013 HC RX MED GY IP 250 OP 250 PS 637: Performed by: ANESTHESIOLOGY

## 2023-10-10 DEVICE — TOTAL CERVICAL DISC REPLACEMENT SYSTEM: MOBI-C CERVICAL DISC PROSTHESIS
Type: IMPLANTABLE DEVICE | Site: SPINE CERVICAL | Status: FUNCTIONAL
Brand: MOBI-C® PLUG & FIT US

## 2023-10-10 RX ORDER — DEXAMETHASONE SODIUM PHOSPHATE 4 MG/ML
INJECTION, SOLUTION INTRA-ARTICULAR; INTRALESIONAL; INTRAMUSCULAR; INTRAVENOUS; SOFT TISSUE PRN
Status: DISCONTINUED | OUTPATIENT
Start: 2023-10-10 | End: 2023-10-10

## 2023-10-10 RX ORDER — ONDANSETRON 2 MG/ML
4 INJECTION INTRAMUSCULAR; INTRAVENOUS EVERY 6 HOURS PRN
Status: DISCONTINUED | OUTPATIENT
Start: 2023-10-10 | End: 2023-10-11 | Stop reason: HOSPADM

## 2023-10-10 RX ORDER — HYDROMORPHONE HCL IN WATER/PF 6 MG/30 ML
0.2 PATIENT CONTROLLED ANALGESIA SYRINGE INTRAVENOUS EVERY 5 MIN PRN
Status: DISCONTINUED | OUTPATIENT
Start: 2023-10-10 | End: 2023-10-10 | Stop reason: HOSPADM

## 2023-10-10 RX ORDER — OXYCODONE HYDROCHLORIDE 5 MG/1
5 TABLET ORAL EVERY 4 HOURS PRN
Status: DISCONTINUED | OUTPATIENT
Start: 2023-10-10 | End: 2023-10-11 | Stop reason: HOSPADM

## 2023-10-10 RX ORDER — POLYETHYLENE GLYCOL 3350 17 G/17G
17 POWDER, FOR SOLUTION ORAL DAILY
Status: DISCONTINUED | OUTPATIENT
Start: 2023-10-11 | End: 2023-10-11 | Stop reason: HOSPADM

## 2023-10-10 RX ORDER — DEXMEDETOMIDINE HYDROCHLORIDE 4 UG/ML
INJECTION, SOLUTION INTRAVENOUS CONTINUOUS PRN
Status: DISCONTINUED | OUTPATIENT
Start: 2023-10-10 | End: 2023-10-10

## 2023-10-10 RX ORDER — NALOXONE HYDROCHLORIDE 0.4 MG/ML
0.4 INJECTION, SOLUTION INTRAMUSCULAR; INTRAVENOUS; SUBCUTANEOUS
Status: DISCONTINUED | OUTPATIENT
Start: 2023-10-10 | End: 2023-10-11 | Stop reason: HOSPADM

## 2023-10-10 RX ORDER — ALBUTEROL SULFATE 0.83 MG/ML
2.5 SOLUTION RESPIRATORY (INHALATION) EVERY 4 HOURS PRN
Status: DISCONTINUED | OUTPATIENT
Start: 2023-10-10 | End: 2023-10-10 | Stop reason: HOSPADM

## 2023-10-10 RX ORDER — HYDROXYZINE HYDROCHLORIDE 25 MG/1
25 TABLET, FILM COATED ORAL EVERY 6 HOURS PRN
Status: DISCONTINUED | OUTPATIENT
Start: 2023-10-10 | End: 2023-10-11 | Stop reason: HOSPADM

## 2023-10-10 RX ORDER — LABETALOL HYDROCHLORIDE 5 MG/ML
10 INJECTION, SOLUTION INTRAVENOUS
Status: DISCONTINUED | OUTPATIENT
Start: 2023-10-10 | End: 2023-10-10 | Stop reason: HOSPADM

## 2023-10-10 RX ORDER — METHOCARBAMOL 750 MG/1
750 TABLET, FILM COATED ORAL EVERY 6 HOURS PRN
Status: DISCONTINUED | OUTPATIENT
Start: 2023-10-10 | End: 2023-10-11 | Stop reason: HOSPADM

## 2023-10-10 RX ORDER — BISACODYL 10 MG
10 SUPPOSITORY, RECTAL RECTAL DAILY PRN
Status: DISCONTINUED | OUTPATIENT
Start: 2023-10-10 | End: 2023-10-11 | Stop reason: HOSPADM

## 2023-10-10 RX ORDER — PROCHLORPERAZINE MALEATE 10 MG
10 TABLET ORAL EVERY 6 HOURS PRN
Status: DISCONTINUED | OUTPATIENT
Start: 2023-10-10 | End: 2023-10-11 | Stop reason: HOSPADM

## 2023-10-10 RX ORDER — HYDROMORPHONE HCL IN WATER/PF 6 MG/30 ML
0.4 PATIENT CONTROLLED ANALGESIA SYRINGE INTRAVENOUS EVERY 5 MIN PRN
Status: DISCONTINUED | OUTPATIENT
Start: 2023-10-10 | End: 2023-10-10 | Stop reason: HOSPADM

## 2023-10-10 RX ORDER — ONDANSETRON 4 MG/1
4 TABLET, ORALLY DISINTEGRATING ORAL EVERY 6 HOURS PRN
Status: DISCONTINUED | OUTPATIENT
Start: 2023-10-10 | End: 2023-10-11 | Stop reason: HOSPADM

## 2023-10-10 RX ORDER — PANTOPRAZOLE SODIUM 40 MG/1
40 TABLET, DELAYED RELEASE ORAL 2 TIMES DAILY
Status: DISCONTINUED | OUTPATIENT
Start: 2023-10-10 | End: 2023-10-11 | Stop reason: HOSPADM

## 2023-10-10 RX ORDER — ONDANSETRON 2 MG/ML
4 INJECTION INTRAMUSCULAR; INTRAVENOUS EVERY 30 MIN PRN
Status: DISCONTINUED | OUTPATIENT
Start: 2023-10-10 | End: 2023-10-10 | Stop reason: HOSPADM

## 2023-10-10 RX ORDER — NALOXONE HYDROCHLORIDE 0.4 MG/ML
0.2 INJECTION, SOLUTION INTRAMUSCULAR; INTRAVENOUS; SUBCUTANEOUS
Status: DISCONTINUED | OUTPATIENT
Start: 2023-10-10 | End: 2023-10-11 | Stop reason: HOSPADM

## 2023-10-10 RX ORDER — LIDOCAINE HYDROCHLORIDE 20 MG/ML
INJECTION, SOLUTION INFILTRATION; PERINEURAL PRN
Status: DISCONTINUED | OUTPATIENT
Start: 2023-10-10 | End: 2023-10-10

## 2023-10-10 RX ORDER — AMOXICILLIN 250 MG
1 CAPSULE ORAL 2 TIMES DAILY PRN
Qty: 20 TABLET | Refills: 0 | Status: SHIPPED | OUTPATIENT
Start: 2023-10-10 | End: 2023-11-21

## 2023-10-10 RX ORDER — FENTANYL CITRATE 50 UG/ML
25 INJECTION, SOLUTION INTRAMUSCULAR; INTRAVENOUS
Status: DISCONTINUED | OUTPATIENT
Start: 2023-10-10 | End: 2023-10-10 | Stop reason: HOSPADM

## 2023-10-10 RX ORDER — OXYCODONE HYDROCHLORIDE 5 MG/1
5 TABLET ORAL ONCE
Status: COMPLETED | OUTPATIENT
Start: 2023-10-10 | End: 2023-10-10

## 2023-10-10 RX ORDER — LIDOCAINE 40 MG/G
CREAM TOPICAL
Status: DISCONTINUED | OUTPATIENT
Start: 2023-10-10 | End: 2023-10-10 | Stop reason: HOSPADM

## 2023-10-10 RX ORDER — GABAPENTIN 300 MG/1
300 CAPSULE ORAL
Status: COMPLETED | OUTPATIENT
Start: 2023-10-10 | End: 2023-10-10

## 2023-10-10 RX ORDER — LIDOCAINE 40 MG/G
CREAM TOPICAL
Status: DISCONTINUED | OUTPATIENT
Start: 2023-10-10 | End: 2023-10-11 | Stop reason: HOSPADM

## 2023-10-10 RX ORDER — AMOXICILLIN 250 MG
1 CAPSULE ORAL 2 TIMES DAILY
Status: DISCONTINUED | OUTPATIENT
Start: 2023-10-10 | End: 2023-10-11 | Stop reason: HOSPADM

## 2023-10-10 RX ORDER — ACETAMINOPHEN 325 MG/1
975 TABLET ORAL EVERY 8 HOURS
Status: DISCONTINUED | OUTPATIENT
Start: 2023-10-10 | End: 2023-10-11 | Stop reason: HOSPADM

## 2023-10-10 RX ORDER — SODIUM CHLORIDE, SODIUM LACTATE, POTASSIUM CHLORIDE, CALCIUM CHLORIDE 600; 310; 30; 20 MG/100ML; MG/100ML; MG/100ML; MG/100ML
INJECTION, SOLUTION INTRAVENOUS CONTINUOUS
Status: DISCONTINUED | OUTPATIENT
Start: 2023-10-10 | End: 2023-10-10 | Stop reason: HOSPADM

## 2023-10-10 RX ORDER — CYCLOBENZAPRINE HCL 10 MG
10 TABLET ORAL 3 TIMES DAILY PRN
Qty: 40 TABLET | Refills: 0 | Status: SHIPPED | OUTPATIENT
Start: 2023-10-10 | End: 2023-10-27

## 2023-10-10 RX ORDER — ONDANSETRON 2 MG/ML
INJECTION INTRAMUSCULAR; INTRAVENOUS PRN
Status: DISCONTINUED | OUTPATIENT
Start: 2023-10-10 | End: 2023-10-10

## 2023-10-10 RX ORDER — HYDROMORPHONE HCL IN WATER/PF 6 MG/30 ML
0.2 PATIENT CONTROLLED ANALGESIA SYRINGE INTRAVENOUS
Status: DISCONTINUED | OUTPATIENT
Start: 2023-10-10 | End: 2023-10-11 | Stop reason: HOSPADM

## 2023-10-10 RX ORDER — ROSUVASTATIN CALCIUM 20 MG/1
20 TABLET, COATED ORAL EVERY EVENING
Status: DISCONTINUED | OUTPATIENT
Start: 2023-10-10 | End: 2023-10-11 | Stop reason: HOSPADM

## 2023-10-10 RX ORDER — FENTANYL CITRATE 50 UG/ML
25 INJECTION, SOLUTION INTRAMUSCULAR; INTRAVENOUS EVERY 5 MIN PRN
Status: DISCONTINUED | OUTPATIENT
Start: 2023-10-10 | End: 2023-10-10 | Stop reason: HOSPADM

## 2023-10-10 RX ORDER — PROPOFOL 10 MG/ML
INJECTION, EMULSION INTRAVENOUS PRN
Status: DISCONTINUED | OUTPATIENT
Start: 2023-10-10 | End: 2023-10-10

## 2023-10-10 RX ORDER — OXYCODONE HYDROCHLORIDE 5 MG/1
5 TABLET ORAL
Status: COMPLETED | OUTPATIENT
Start: 2023-10-10 | End: 2023-10-10

## 2023-10-10 RX ORDER — CLINDAMYCIN PHOSPHATE 900 MG/50ML
900 INJECTION, SOLUTION INTRAVENOUS EVERY 8 HOURS
Status: COMPLETED | OUTPATIENT
Start: 2023-10-10 | End: 2023-10-11

## 2023-10-10 RX ORDER — OXYCODONE HYDROCHLORIDE 5 MG/1
10 TABLET ORAL EVERY 4 HOURS PRN
Status: DISCONTINUED | OUTPATIENT
Start: 2023-10-10 | End: 2023-10-11 | Stop reason: HOSPADM

## 2023-10-10 RX ORDER — OXYCODONE HYDROCHLORIDE 5 MG/1
10 TABLET ORAL
Status: DISCONTINUED | OUTPATIENT
Start: 2023-10-10 | End: 2023-10-10 | Stop reason: HOSPADM

## 2023-10-10 RX ORDER — CALCIUM CARBONATE 500 MG/1
500 TABLET, CHEWABLE ORAL 4 TIMES DAILY PRN
Status: DISCONTINUED | OUTPATIENT
Start: 2023-10-10 | End: 2023-10-11 | Stop reason: HOSPADM

## 2023-10-10 RX ORDER — ACETAMINOPHEN 325 MG/1
650 TABLET ORAL EVERY 4 HOURS PRN
Status: DISCONTINUED | OUTPATIENT
Start: 2023-10-13 | End: 2023-10-11 | Stop reason: HOSPADM

## 2023-10-10 RX ORDER — ACETAMINOPHEN 500 MG
1000 TABLET ORAL ONCE
Status: COMPLETED | OUTPATIENT
Start: 2023-10-10 | End: 2023-10-10

## 2023-10-10 RX ORDER — OXYCODONE HYDROCHLORIDE 5 MG/1
5-10 TABLET ORAL EVERY 6 HOURS PRN
Qty: 40 TABLET | Refills: 0 | Status: SHIPPED | OUTPATIENT
Start: 2023-10-10 | End: 2023-10-27

## 2023-10-10 RX ORDER — KETAMINE HYDROCHLORIDE 10 MG/ML
INJECTION INTRAMUSCULAR; INTRAVENOUS PRN
Status: DISCONTINUED | OUTPATIENT
Start: 2023-10-10 | End: 2023-10-10

## 2023-10-10 RX ORDER — HYDROMORPHONE HCL IN WATER/PF 6 MG/30 ML
0.4 PATIENT CONTROLLED ANALGESIA SYRINGE INTRAVENOUS
Status: DISCONTINUED | OUTPATIENT
Start: 2023-10-10 | End: 2023-10-11 | Stop reason: HOSPADM

## 2023-10-10 RX ORDER — MEPERIDINE HYDROCHLORIDE 25 MG/ML
12.5 INJECTION INTRAMUSCULAR; INTRAVENOUS; SUBCUTANEOUS EVERY 5 MIN PRN
Status: DISCONTINUED | OUTPATIENT
Start: 2023-10-10 | End: 2023-10-10 | Stop reason: HOSPADM

## 2023-10-10 RX ORDER — BUPIVACAINE HYDROCHLORIDE AND EPINEPHRINE 5; 5 MG/ML; UG/ML
INJECTION, SOLUTION PERINEURAL PRN
Status: DISCONTINUED | OUTPATIENT
Start: 2023-10-10 | End: 2023-10-10 | Stop reason: HOSPADM

## 2023-10-10 RX ORDER — ONDANSETRON 4 MG/1
4 TABLET, ORALLY DISINTEGRATING ORAL EVERY 30 MIN PRN
Status: DISCONTINUED | OUTPATIENT
Start: 2023-10-10 | End: 2023-10-10 | Stop reason: HOSPADM

## 2023-10-10 RX ORDER — EPHEDRINE SULFATE 50 MG/ML
INJECTION, SOLUTION INTRAMUSCULAR; INTRAVENOUS; SUBCUTANEOUS PRN
Status: DISCONTINUED | OUTPATIENT
Start: 2023-10-10 | End: 2023-10-10

## 2023-10-10 RX ORDER — FENTANYL CITRATE 50 UG/ML
50 INJECTION, SOLUTION INTRAMUSCULAR; INTRAVENOUS EVERY 5 MIN PRN
Status: DISCONTINUED | OUTPATIENT
Start: 2023-10-10 | End: 2023-10-10 | Stop reason: HOSPADM

## 2023-10-10 RX ORDER — LORATADINE 10 MG/1
10 TABLET ORAL DAILY
COMMUNITY

## 2023-10-10 RX ORDER — CLINDAMYCIN PHOSPHATE 900 MG/50ML
900 INJECTION, SOLUTION INTRAVENOUS SEE ADMIN INSTRUCTIONS
Status: DISCONTINUED | OUTPATIENT
Start: 2023-10-10 | End: 2023-10-10 | Stop reason: HOSPADM

## 2023-10-10 RX ORDER — CLINDAMYCIN PHOSPHATE 900 MG/50ML
900 INJECTION, SOLUTION INTRAVENOUS
Status: COMPLETED | OUTPATIENT
Start: 2023-10-10 | End: 2023-10-10

## 2023-10-10 RX ORDER — HYDRALAZINE HYDROCHLORIDE 20 MG/ML
2.5-5 INJECTION INTRAMUSCULAR; INTRAVENOUS EVERY 10 MIN PRN
Status: DISCONTINUED | OUTPATIENT
Start: 2023-10-10 | End: 2023-10-10 | Stop reason: HOSPADM

## 2023-10-10 RX ADMIN — SODIUM CHLORIDE, POTASSIUM CHLORIDE, SODIUM LACTATE AND CALCIUM CHLORIDE: 600; 310; 30; 20 INJECTION, SOLUTION INTRAVENOUS at 06:42

## 2023-10-10 RX ADMIN — DEXMEDETOMIDINE HYDROCHLORIDE 0.2 MCG/KG/HR: 200 INJECTION INTRAVENOUS at 08:17

## 2023-10-10 RX ADMIN — LIDOCAINE HYDROCHLORIDE 100 MG: 20 INJECTION, SOLUTION INFILTRATION; PERINEURAL at 08:10

## 2023-10-10 RX ADMIN — PHENYLEPHRINE HYDROCHLORIDE 100 MCG: 10 INJECTION INTRAVENOUS at 10:03

## 2023-10-10 RX ADMIN — Medication 5 MG: at 09:31

## 2023-10-10 RX ADMIN — OXYCODONE HYDROCHLORIDE 10 MG: 5 TABLET ORAL at 17:58

## 2023-10-10 RX ADMIN — ROCURONIUM BROMIDE 50 MG: 50 INJECTION, SOLUTION INTRAVENOUS at 08:12

## 2023-10-10 RX ADMIN — PHENYLEPHRINE HYDROCHLORIDE 200 MCG: 10 INJECTION INTRAVENOUS at 08:46

## 2023-10-10 RX ADMIN — ROCURONIUM BROMIDE 20 MG: 50 INJECTION, SOLUTION INTRAVENOUS at 09:35

## 2023-10-10 RX ADMIN — CLINDAMYCIN PHOSPHATE 900 MG: 900 INJECTION, SOLUTION INTRAVENOUS at 06:42

## 2023-10-10 RX ADMIN — Medication 10 MG: at 09:13

## 2023-10-10 RX ADMIN — HYDROMORPHONE HYDROCHLORIDE 0.2 MG: 0.2 INJECTION, SOLUTION INTRAMUSCULAR; INTRAVENOUS; SUBCUTANEOUS at 10:50

## 2023-10-10 RX ADMIN — PHENYLEPHRINE HYDROCHLORIDE 100 MCG: 10 INJECTION INTRAVENOUS at 09:28

## 2023-10-10 RX ADMIN — Medication 40 MG: at 08:37

## 2023-10-10 RX ADMIN — SENNOSIDES AND DOCUSATE SODIUM 1 TABLET: 50; 8.6 TABLET ORAL at 21:00

## 2023-10-10 RX ADMIN — PHENYLEPHRINE HYDROCHLORIDE 100 MCG: 10 INJECTION INTRAVENOUS at 08:53

## 2023-10-10 RX ADMIN — OXYCODONE HYDROCHLORIDE 5 MG: 5 TABLET ORAL at 12:38

## 2023-10-10 RX ADMIN — ROSUVASTATIN CALCIUM 20 MG: 20 TABLET, FILM COATED ORAL at 21:00

## 2023-10-10 RX ADMIN — ONDANSETRON 4 MG: 2 INJECTION INTRAMUSCULAR; INTRAVENOUS at 09:53

## 2023-10-10 RX ADMIN — HYDROMORPHONE HYDROCHLORIDE 0.5 MG: 1 INJECTION, SOLUTION INTRAMUSCULAR; INTRAVENOUS; SUBCUTANEOUS at 08:34

## 2023-10-10 RX ADMIN — DEXAMETHASONE SODIUM PHOSPHATE 4 MG: 4 INJECTION, SOLUTION INTRA-ARTICULAR; INTRALESIONAL; INTRAMUSCULAR; INTRAVENOUS; SOFT TISSUE at 08:16

## 2023-10-10 RX ADMIN — OXYCODONE HYDROCHLORIDE 5 MG: 5 TABLET ORAL at 11:14

## 2023-10-10 RX ADMIN — MIDAZOLAM 2 MG: 1 INJECTION INTRAMUSCULAR; INTRAVENOUS at 08:06

## 2023-10-10 RX ADMIN — PANTOPRAZOLE SODIUM 40 MG: 40 TABLET, DELAYED RELEASE ORAL at 21:00

## 2023-10-10 RX ADMIN — GABAPENTIN 300 MG: 300 CAPSULE ORAL at 06:43

## 2023-10-10 RX ADMIN — ROCURONIUM BROMIDE 10 MG: 50 INJECTION, SOLUTION INTRAVENOUS at 08:42

## 2023-10-10 RX ADMIN — PHENYLEPHRINE HYDROCHLORIDE 100 MCG: 10 INJECTION INTRAVENOUS at 08:37

## 2023-10-10 RX ADMIN — HYDROMORPHONE HYDROCHLORIDE 0.2 MG: 0.2 INJECTION, SOLUTION INTRAMUSCULAR; INTRAVENOUS; SUBCUTANEOUS at 11:16

## 2023-10-10 RX ADMIN — CLINDAMYCIN PHOSPHATE 900 MG: 900 INJECTION, SOLUTION INTRAVENOUS at 23:21

## 2023-10-10 RX ADMIN — ACETAMINOPHEN 1000 MG: 500 TABLET, FILM COATED ORAL at 11:46

## 2023-10-10 RX ADMIN — PHENYLEPHRINE HYDROCHLORIDE 100 MCG: 10 INJECTION INTRAVENOUS at 08:55

## 2023-10-10 RX ADMIN — PHENYLEPHRINE HYDROCHLORIDE 100 MCG: 10 INJECTION INTRAVENOUS at 08:40

## 2023-10-10 RX ADMIN — DEXAMETHASONE SODIUM PHOSPHATE 6 MG: 4 INJECTION, SOLUTION INTRA-ARTICULAR; INTRALESIONAL; INTRAMUSCULAR; INTRAVENOUS; SOFT TISSUE at 09:35

## 2023-10-10 RX ADMIN — SODIUM CHLORIDE 500 ML: 9 INJECTION, SOLUTION INTRAVENOUS at 21:02

## 2023-10-10 RX ADMIN — PROPOFOL 200 MG: 10 INJECTION, EMULSION INTRAVENOUS at 08:10

## 2023-10-10 RX ADMIN — SODIUM CHLORIDE, POTASSIUM CHLORIDE, SODIUM LACTATE AND CALCIUM CHLORIDE: 600; 310; 30; 20 INJECTION, SOLUTION INTRAVENOUS at 09:51

## 2023-10-10 RX ADMIN — PHENYLEPHRINE HYDROCHLORIDE 100 MCG: 10 INJECTION INTRAVENOUS at 08:33

## 2023-10-10 RX ADMIN — PHENYLEPHRINE HYDROCHLORIDE 200 MCG: 10 INJECTION INTRAVENOUS at 09:13

## 2023-10-10 RX ADMIN — ACETAMINOPHEN 975 MG: 325 TABLET, FILM COATED ORAL at 20:59

## 2023-10-10 RX ADMIN — SUGAMMADEX 200 MG: 100 INJECTION, SOLUTION INTRAVENOUS at 10:16

## 2023-10-10 RX ADMIN — HYDROMORPHONE HYDROCHLORIDE 0.2 MG: 0.2 INJECTION, SOLUTION INTRAMUSCULAR; INTRAVENOUS; SUBCUTANEOUS at 10:44

## 2023-10-10 RX ADMIN — PHENYLEPHRINE HYDROCHLORIDE 100 MCG: 10 INJECTION INTRAVENOUS at 09:57

## 2023-10-10 RX ADMIN — CLINDAMYCIN PHOSPHATE 900 MG: 900 INJECTION, SOLUTION INTRAVENOUS at 17:47

## 2023-10-10 ASSESSMENT — ACTIVITIES OF DAILY LIVING (ADL)
ADLS_ACUITY_SCORE: 35
ADLS_ACUITY_SCORE: 35
ADLS_ACUITY_SCORE: 36
ADLS_ACUITY_SCORE: 35

## 2023-10-10 NOTE — BRIEF OP NOTE
[facility]    Brief Operative Note    Pre-operative diagnosis: Cervical radiculopathy [M54.12]  Post-operative diagnosis Same as pre-operative diagnosis    Procedure: Cervical 5 to cervical 6 arthroplasty, N/A - Spine    Surgeon: Surgeon(s) and Role:     * Jered Salvador MD - Primary     * Gladys Cummings NP - Assisting  Anesthesia: General   Estimated Blood Loss: 25ml     Drains: None   Findings: See op note  .  Implants:   Implant Name Type Inv. Item Serial No.  Lot No. LRB No. Used Action   IMP DISC LDR MOBI-C CERVICAL [id number] H6  [id number] - [id number] Total Joint Component/Insert IMP DISC LDR MOBI-C CERVICAL [id number] H6  [id number]  LDR SPINE [id number] Left 1 Implanted

## 2023-10-10 NOTE — ANESTHESIA CARE TRANSFER NOTE
Patient: Enmanuel Randall    Procedure: Procedure(s):  Cervical 5 to cervical 6 arthroplasty       Diagnosis: Cervical radiculopathy [M54.12]  Diagnosis Additional Information: No value filed.    Anesthesia Type:   General     Note:    Oropharynx: oropharynx clear of all foreign objects and spontaneously breathing  Level of Consciousness: awake  Oxygen Supplementation: face mask  Level of Supplemental Oxygen (L/min / FiO2): 6  Independent Airway: airway patency satisfactory and stable  Dentition: dentition unchanged  Vital Signs Stable: post-procedure vital signs reviewed and stable  Report to RN Given: handoff report given  Patient transferred to: PACU    Handoff Report: Identifed the Patient, Identified the Reponsible Provider, Reviewed the pertinent medical history, Discussed the surgical course, Reviewed Intra-OP anesthesia mangement and issues during anesthesia, Set expectations for post-procedure period and Allowed opportunity for questions and acknowledgement of understanding      Vitals:  Vitals Value Taken Time   /90 10/10/23 1030   Temp 98.1 F    Pulse 95 10/10/23 1041   Resp 8 10/10/23 1041   SpO2 93 % 10/10/23 1041   Vitals shown include unvalidated device data.    Electronically Signed By: ALEXANDRA Valentin CRNA  October 10, 2023  10:42 AM

## 2023-10-10 NOTE — CONSULTS
[facility]  Consult Note - Hospitalist Service  Date of Admission:  10/10/2023  Consult Requested by: Gladys Cummings NP  Reason for Consult: post op hypoxia in PACU    Assessment & Plan   Enmanuel Randall is a 53 year old male with a past medical history significant for spinal stenosis, tobacco use, recent GIB secondary to duodenal ulcer admitted on 10/10/2023 for observation after elective C5-6 arthoplasty due to post operative hypoxia.     Post operative hypoxia  Pt unable to be weaned to room air in PACU, requiring 2L NC at time of my evaluation. He is asymptomatic without any dyspnea, chest pain, palpitations, cough. Felt a bit dizzy the first time he got out of bed which has resolved. HR also elevated  though he is having some discomfort post op. Low clinical suspicion for PE at this time but remains a consideration. Lungs are clear on exam.   Hx tobacco use (quit one week before surgery) but no evidence of COPD on chest CT 8/2022.  No known JOSIAS.   --encourage IS and ambulate as tolerated  --if unable to wean oxygen in am and remains tachycardic consider CT PE, would expedite this if worsening vitals or becomes symptomatic tonight   --check EKG    S/p C5-C6 arthoplasty for cervical radiculopathy   Procedure was performed by Dr. Salvador under general anesthesia with an EBL of 25ml. There were no intraoperative complications. In PACU pt was unable to wean off oxygen thus admission was requested for observation.   --post op cares per Neurosurgery including DVT prophylaxis and pain control   --looks dry on exam, will give some fluid tonight  --check CBC, BMP    Hx UGIB secondary to duodenal ulcer 9/2023  Hgb had improved pre op to 11.5.   Taking PPI regularly. No recurrent symptoms  --continue bid PPI  --NO NSAIDS      The patient's care was discussed with Dr. Guillermo who agrees with the above plan     Clinically Significant Risk Factors Present on Admission                    # Acute  Respiratory Failure: Documented O2 saturation < 91%.  Continue supplemental oxygen as needed     # Overweight: Estimated body mass index is 25.59 kg/m  as calculated from the following:    Height as of this encounter: 1.829 m (6').    Weight as of this encounter: 85.6 kg (188 lb 11.2 oz).              Urszula Aguiar PA-C  Hospitalist Service  Securely message with [employer] (more info)  Text page via [facility] Paging/Directory   ______________________________________________________________________    Chief Complaint   hypoxia     History is obtained from the patient    History of Present Illness   Enmanuel Randall is a 53 year old male with a past medical history significant for spinal stenosis, tobacco use, recent GIB secondary to duodenal ulcer admitted on 10/10/2023 for observation after elective C5-6 arthoplasty due to post operative hypoxia.   He was hospitalized in September 2023 with melena, found to have upper GI bleed related to duodenal ulcer.  He has been on PPI therapy since that time without any recurrent symptoms.  His preop hemoglobin had come up to 11.5.  He presents today for elective C5-6 arthroplasty for cervical radiculopathy.  His procedure was performed by Dr. Salvador under general anesthesia with plans to discharge home same day, however he was unable to wean off oxygen in PACU with saturations in the mid 80s on room air.  He is presently evaluated in his room on the Ortho floor.  He reports he is overall feeling well at this time.  He has some discomfort but it is currently manageable.  He has been tolerating p.o. intake.  He denies any shortness of breath.  No chest pain, palpitations, cough.  He felt a bit dizzy the first time he got out of bed which is since resolved.  No calf pain.  He is surprised to hear that his oxygen levels are a bit low as he has [place] no symptoms.  He is a 17-year smoker and stopped smoking approximately 1 week ago but has no known history of COPD or asthma.  He was  prescribed a rescue inhaler back in September when he had an episode of walking pneumonia but otherwise does not use regular respiratory medications.      Past Medical History    Past Medical History:   Diagnosis Date    Cervical radiculopathy     Duodenal ulcer 09/22/2023    Dyslipidemia     ED (erectile dysfunction)     Seborrheic dermatitis     Spinal stenosis in cervical region     Tobacco use disorder     Upper GI bleed        Past Surgical History   Past Surgical History:   Procedure Laterality Date    BACK SURGERY      C4-C5 surgery    COLONOSCOPY      ESOPHAGOSCOPY, GASTROSCOPY, DUODENOSCOPY (EGD), COMBINED N/A 09/03/2023    Procedure: ESOPHAGOGASTRODUODENOSCOPY with EPINEPHRINE INJECTION and CAUTERY and BIOPSY;  Surgeon: Eliazar Leyva MD;  Location: [facility] Main OR    HERNIA REPAIR Left     inguinal hernia repair       Medications   Medications Prior to Admission   Medication Sig Dispense Refill Last Dose    loratadine (CLARITIN) 10 MG tablet Take 10 mg by mouth daily   10/9/2023 at am    pantoprazole (PROTONIX) 40 MG EC tablet Take 1 tablet (40 mg) by mouth 2 times daily 60 tablet 0 10/10/2023 at 0500    rosuvastatin (CRESTOR) 20 MG tablet Take 20 mg by mouth daily   10/9/2023 at pm    sildenafil (VIAGRA) 100 MG tablet Take 100 mg by mouth daily as needed   Past Month    Turmeric (QC TUMERIC COMPLEX PO) Take 2 tablets by mouth daily   10/9/2023 at am    Vitamin D3 (VITAMIN D, CHOLECALCIFEROL,) 25 mcg (1000 units) tablet Take 25 mcg by mouth daily   10/9/2023 at am    [DISCONTINUED] cyclobenzaprine (FLEXERIL) 10 MG tablet Take 10 mg by mouth 3 times daily as needed for muscle spasms   Past Week    [DISCONTINUED] oxyCODONE (ROXICODONE) 5 MG tablet Take 1 tablet (5 mg) by mouth every 6 hours as needed for pain 25 tablet 0 10/8/2023          Social History   I have reviewed this patient's social history and updated it with pertinent information if needed.  Social History     Tobacco Use     Smoking status: Former     Packs/day: 1.00     Years: 10.00     Pack years: 10.00     Types: Cigarettes     Quit date: 10/2/2023     Years since quittin.0    Smokeless tobacco: Never   Vaping Use    Vaping Use: Never used   Substance Use Topics    Alcohol use: Not Currently    Drug use: Never        Physical Exam   Temp: 97.6  F (36.4  C) Temp src: Oral BP: (!) 132/93 Pulse: 96   Resp: 20 SpO2: 92 % O2 Device: Nasal cannula Oxygen Delivery: 2 LPM  Vitals:    10/10/23 0610   Weight: 85.6 kg (188 lb 11.2 oz)     Vital Signs with Ranges  Temp:  [97.6  F (36.4  C)-98.3  F (36.8  C)] 97.6  F (36.4  C)  Pulse:  [71-98] 96  Resp:  [8-20] 20  BP: (118-149)/() 132/93  SpO2:  [84 %-97 %] 92 %  I/O last 3 completed shifts:  In: 1800 [P.O.:200; I.V.:1600]  Out: -     Constitutional: Alert and oriented, sitting up in bed. Appears comfortable and is appropriately conversant   ENT: dry mucous membranes  Respiratory: Lungs clear to auscultation bilaterally, no increased work of breathing or wheezing   Cardiovascular: Regular rhythm with mild tachycardia. No murmur.   Extremities:  no LE edema or calf tenderness   GI: active bowel sounds, abdomen soft, non-tender  MSK:  moves all four extremities         Medical Decision Making       65 MINUTES SPENT BY ME on the date of service doing chart review, history, exam, documentation & further activities per the note.      Data         Imaging results reviewed over the past 24 hrs:   Recent Results (from the past 24 hour(s))   XR Surgery BECKIE Fluoro Less Than 5 Min w Stills    Narrative    XR SURGERY BECKIE FLUORO LESS THAN 5 MIN W STILLS   10/10/2023 10:03 AM     HISTORY: C5-6 arthroplasty    COMPARISON: None.      Impression    IMPRESSION: Procedural fluoroscopy.    CAIT RODRIGUEZ MD         SYSTEM ID:  [id number]

## 2023-10-10 NOTE — ANESTHESIA POSTPROCEDURE EVALUATION
Patient: Enmanuel Randall    Procedure: Procedure(s):  Cervical 5 to cervical 6 arthroplasty       Anesthesia Type:  General    Note:     Postop Pain Control: Uneventful            Sign Out: Well controlled pain   PONV: No   Neuro/Psych: Uneventful            Sign Out: Acceptable/Baseline neuro status   Airway/Respiratory: Uneventful            Sign Out: Acceptable/Baseline resp. status   CV/Hemodynamics: Uneventful            Sign Out: Acceptable CV status; No obvious hypovolemia; No obvious fluid overload   Other NRE:    DID A NON-ROUTINE EVENT OCCUR? No           Last vitals:  Vitals Value Taken Time   /98 10/10/23 1146   Temp 36.6  C (97.9  F) 10/10/23 1130   Pulse 90 10/10/23 1153   Resp 18 10/10/23 1209   SpO2 89 % 10/10/23 1211   Vitals shown include unvalidated device data.    Electronically Signed By: Eusebia Solares MD, MD  October 10, 2023  12:12 PM

## 2023-10-10 NOTE — OR NURSING
Dr. Solares notified of pt' slow O2 sats and decreased breath sounds on the left.  Okay to continue with discharge plans. Pt has been instructed on use of the IS.

## 2023-10-10 NOTE — OR NURSING
Report called to Deloris STEIN. Patient transferred to 2423 with belongings and . Approved to transfer to ortho spine by Dr. Solares. Patient's spouse has patient's discharge paperwork and prescriptions.

## 2023-10-10 NOTE — OR NURSING
While in phase 2, patient's Sp02 decreased to 79% on room air. Patient drowsy, responds to verbal stimulation. Dr. Sun notified and Gladys MENARD notified for observation orders.

## 2023-10-10 NOTE — DISCHARGE INSTRUCTIONS
Reasons to contact your surgeon:    Signs of possible infection: Check your incision daily for redness, swelling, warmth, red streaks or foul drainage.   Elevated temperature.  Pain not controlled with pain medication and/or rest.   Uncontrolled nausea or vomiting.  Any questions or concerns.      Today you were given 1000 mg of Tylenol at 11:45 am. The recommended daily maximum dose is 4000 mg.     Same Day Surgery Discharge Instructions for  Sedation and General Anesthesia     It's not unusual to feel dizzy, light-headed or faint for up to 24 hours after surgery or while taking pain medication.  If you have these symptoms: sit for a few minutes before standing and have someone assist you when you get up to walk or use the bathroom.    You should rest and relax for the next 24 hours. We recommend you make arrangements to have an adult stay with you for at least 24 hours after your discharge.  Avoid hazardous and strenuous activity.    DO NOT DRIVE any vehicle or operate mechanical equipment for 24 hours following the end of your surgery.  Even though you may feel normal, your reactions may be affected by the medication you have received.    Do not drink alcoholic beverages for 24 hours following surgery.     Slowly progress to your regular diet as you feel able. It's not unusual to feel nauseated and/or vomit after receiving anesthesia.  If you develop these symptoms, drink clear liquids (apple juice, ginger ale, broth, 7-up, etc. ) until you feel better.  If your nausea and vomiting persists for 24 hours, please notify your surgeon.      All narcotic pain medications, along with inactivity and anesthesia, can cause constipation. Drinking plenty of liquids and increasing fiber intake will help.    For any questions of a medical nature, call your surgeon.    Do not make important decisions for 24 hours.    If you had general anesthesia, you may have a sore throat for a couple of days related to the breathing tube  used during surgery.  You may use Cepacol lozenges to help with this discomfort.  If it worsens or if you develop a fever, contact your surgeon.     If you feel your pain is not well managed with the pain medications prescribed by your surgeon, please contact your surgeon's office to let them know so they can address your concerns.     Discharge Instructions: Using an   Incentive Spirometer    An incentive spirometer is a device that helps you do deep breathing exercises. These exercises will help you breathe better and improve the function of your lungs. The incentive spirometer provides a way for you to take an active part in your recovery.  Follow these steps to use your incentive spirometer:  Inhale normally. Relax and breathe out.  Place your lips tightly around the mouthpiece.  Make sure the device is upright and not tilted.  Breathe in slowly and deeply. Fill your lungs with as much air as you can.   Hold your breath long enough to keep the disk raised for at least 3 seconds while keeping the bead on the right side between the two arrows.   Perform this exercise 10 times every hour while you re awake or as often as your doctor instructs.  If you were also taught coughing exercises, perform them regularly as instructed.    **If you have questions or concerns about your procedure,   call Dr. Salvador at [phone]**

## 2023-10-10 NOTE — OP NOTE
DATE:  October 10, 2023  SURGEON:  Jered Salvador MD       ASSISTANT:  Gladys Cummings NP  (Note: The assistant was present for and assisted with the entire surgery and his/her role as an assistant was crucial for aid in positioning, exposure, suctioning, retraction and closure)       PREOPERATIVE DIAGNOSIS:  Cervical radiculopathy.       POSTOPERATIVE DIAGNOSIS:  Cervical radiculopathy.       PROCEDURES:   1.  Cervical 5-6 anterior discectomy and bilateral foraminal decompression, preparation of disk space for arthroplasty, and insertion of LDR Mobi-C artificial disc.   2.  Use of intraoperative microscope and fluoroscopy.       ESTIMATED BLOOD LOSS:  25 mL.       INDICATIONS FOR PROCEDURE: 53 year old male with prior C6-7 arthroplasty presented with intractable neck and arm pain, which failed to improve with non-surgical management.  MRI showed C5-6 central/foraminal stenosis.  Risks, benefits, indications, and alternatives were discussed with the patient and family in detail.  All questions were answered, and they wished to proceed with surgery.       DESCRIPTION OF PROCEDURE:  The patient was positioned supine.  Our sterile prepping and draping procedures were performed.  Antibiotics were administered and timeout was performed.  A right-sided horizontal neck incision was planned.  The 10 blade was used to open the incision and the monopolar was used to come down on the platysma and divide the platysma.  The Metzenbaum scissors used to create a plane in the investing fascia medial to the sternocleidomastoid muscle.  Blunt dissection was used to come down upon the anterior cervical spine.  The spinal needle was used to verify the correct level.  The monopolar was used to elevate the longus colli the Trimline retractor was inserted.  The microscope was brought into the field, and under microscopy the #15 blade was used to perform an annulotomy and a complete discectomy was performed with a combination of curets,  pituitary rongeurs and Kerrison rongeurs.  The posterior longitudinal ligament was removed, and the bilateral foramina were decompressed with the Kerrison rongeurs.  Hemostasis was achieved with Surgiflo.  The LDR Mobi-C graft was inserted into the disk space.  Fluoroscopy demonstrated excellent positioning.  Antibiotic irrigation was performed, and hemostasis was achieved.  The platysma and dermal layer were closed with 3-0 Vicryl sutures and the skin was closed with a running subcuticular stitch.

## 2023-10-10 NOTE — ANESTHESIA PROCEDURE NOTES
Airway       Patient location during procedure: OR       Procedure Start/Stop Times: 10/10/2023 8:14 AM  Staff -        CRNA: Celso Alvarado APRN CRNA       Performed By: CRNA  Consent for Airway        Urgency: elective  Indications and Patient Condition       Indications for airway management: jigna-procedural       Induction type:intravenous       Mask difficulty assessment: 1 - vent by mask    Final Airway Details       Final airway type: endotracheal airway       Successful airway: ETT - single and Oral  Endotracheal Airway Details        ETT size (mm): 8.0       Cuffed: yes       Successful intubation technique: video laryngoscopy       VL Blade Size: Glidescope 4       Grade View of Cords: 1       Adjucts: stylet       Position: Right       Measured from: gums/teeth       Secured at (cm): 23       Bite block used: None    Post intubation assessment        Placement verified by: capnometry, equal breath sounds and chest rise        Number of attempts at approach: 1       Number of other approaches attempted: 0       Secured with: silk tape       Ease of procedure: easy       Dentition: Intact and Unchanged    Medication(s) Administered   Medication Administration Time: 10/10/2023 8:14 AM    Additional Comments       Cervical neutrality maintained throughout induction and intubation

## 2023-10-11 ENCOUNTER — APPOINTMENT (OUTPATIENT)
Dept: PHYSICAL THERAPY | Facility: CLINIC | Age: 53
End: 2023-10-11
Attending: NEUROLOGICAL SURGERY
Payer: COMMERCIAL

## 2023-10-11 VITALS
OXYGEN SATURATION: 92 % | DIASTOLIC BLOOD PRESSURE: 57 MMHG | TEMPERATURE: 97.6 F | HEART RATE: 105 BPM | SYSTOLIC BLOOD PRESSURE: 98 MMHG | BODY MASS INDEX: 25.56 KG/M2 | HEIGHT: 72 IN | WEIGHT: 188.7 LBS | RESPIRATION RATE: 18 BRPM

## 2023-10-11 PROCEDURE — 250N000011 HC RX IP 250 OP 636: Performed by: NURSE PRACTITIONER

## 2023-10-11 PROCEDURE — 250N000013 HC RX MED GY IP 250 OP 250 PS 637: Performed by: NURSE PRACTITIONER

## 2023-10-11 PROCEDURE — 97530 THERAPEUTIC ACTIVITIES: CPT | Mod: GP | Performed by: PHYSICAL THERAPIST

## 2023-10-11 PROCEDURE — 97161 PT EVAL LOW COMPLEX 20 MIN: CPT | Mod: GP | Performed by: PHYSICAL THERAPIST

## 2023-10-11 PROCEDURE — 99214 OFFICE O/P EST MOD 30 MIN: CPT | Performed by: INTERNAL MEDICINE

## 2023-10-11 RX ADMIN — CLINDAMYCIN PHOSPHATE 900 MG: 900 INJECTION, SOLUTION INTRAVENOUS at 06:29

## 2023-10-11 RX ADMIN — PANTOPRAZOLE SODIUM 40 MG: 40 TABLET, DELAYED RELEASE ORAL at 08:50

## 2023-10-11 RX ADMIN — SENNOSIDES AND DOCUSATE SODIUM 1 TABLET: 50; 8.6 TABLET ORAL at 08:49

## 2023-10-11 RX ADMIN — ACETAMINOPHEN 975 MG: 325 TABLET, FILM COATED ORAL at 06:21

## 2023-10-11 RX ADMIN — POLYETHYLENE GLYCOL 3350 17 G: 17 POWDER, FOR SOLUTION ORAL at 08:50

## 2023-10-11 ASSESSMENT — ACTIVITIES OF DAILY LIVING (ADL)
ADLS_ACUITY_SCORE: 36

## 2023-10-11 NOTE — DISCHARGE SUMMARY
Patient vital signs are at baseline: Yes, on RA sat @ 93%  Patient able to ambulate as they were prior to admission or with assist devices provided by therapies during their stay:  Yes  Patient MUST void prior to discharge:  Yes  Patient able to tolerate oral intake:  Yes  Pain has adequate pain control using Oral analgesics:  Yes  Does patient have an identified :  Yes  Has goal D/C date and time been discussed with patient:  Yes    Patient A&Ox4. CMS inatct. Anterior Cervical incision DCI. Pain discharging home with family. Pt was some day surgery that Transfered to the floor due to Post Operative Hypoxia for O2 observation, so all the instructions and medications was provided to Pt yesterday. Belong returned to Pt/family.

## 2023-10-11 NOTE — PROGRESS NOTES
10/11/23 5535   Appointment Info   Signing Clinician's Name / Credentials (PT) Mendez Patrick DPT   Living Environment   People in Home spouse   Current Living Arrangements house   Home Accessibility stairs to enter home;stairs within home   Number of Stairs, Main Entrance 1   Stair Railings, Main Entrance railing on left side (ascending)   Number of Stairs, Within Home, Primary none   Stair Railings, Within Home, Primary none   Transportation Anticipated car, drives self;family or friend will provide   Living Environment Comments Pt's spouse present for session and able to provide assist at discharge.   Self-Care   Usual Activity Tolerance good   Current Activity Tolerance moderate   Equipment Currently Used at Home none   Fall history within last six months no   General Information   Onset of Illness/Injury or Date of Surgery 10/10/23   Referring Physician Jered Salvador MD   Patient/Family Therapy Goals Statement (PT) Return home.   Pertinent History of Current Problem (include personal factors and/or comorbidities that impact the POC) 52 y/o male POD # 1 C5-C6 arthroplasty.   Existing Precautions/Restrictions fall;spinal   General Observations Pt sitting up in recliner chair upon arrival of therapist.   Cognition   Affect/Mental Status (Cognition) WFL   Orientation Status (Cognition) oriented x 3   Follows Commands (Cognition) WFL   Pain Assessment   Patient Currently in Pain   (Anterior neck pain: 1-2/10)   Integumentary/Edema   Integumentary/Edema Comments Anterior cervical incision covered with dressing.   Posture    Posture Comments Noted forward head and shoulder posture sitting and standing.   Range of Motion (ROM)   ROM Comment B LEs WFL. B UEs not assessed d/t recent spinal procedure.   Strength (Manual Muscle Testing)   Strength Comments Not formally assessed, pt demonstrates at least 3/5 grossly in B LEs and UEs.   Bed Mobility   Comment, (Bed Mobility) NT. Pt reports no concerns with  getting in/out of bed.   Transfers   Comment, (Transfers) Sit <> stand with no AD independently.   Gait/Stairs (Locomotion)   Comment, (Gait/Stairs) Pt amb 10' wtih no AD independently.   Balance   Balance Comments Noted good seated, standing and dynamic balance with no AD.   Sensory Examination   Sensory Perception Comments Pt reports varying numbness/tingling in B UEs when supine.   Clinical Impression   Criteria for Skilled Therapeutic Intervention Yes, treatment indicated   PT Diagnosis (PT) Difficulty with functional mobility.   Influenced by the following impairments Pain, Spinal Precautions, Decreased activity tolerance   Functional limitations due to impairments Limited functional mobility.   Clinical Presentation (PT Evaluation Complexity) stable   Clinical Presentation Rationale Based on PMH, currently presentation, and social support.   Clinical Decision Making (Complexity) low complexity   Planned Therapy Interventions (PT) gait training;bed mobility training;stair training;strengthening;transfer training   Risk & Benefits of therapy have been explained patient   PT Total Evaluation Time   PT Eval, Low Complexity Minutes (55000) 10   Plan of Care Review   Plan of Care Reviewed With patient   Physical Therapy Goals   PT Frequency One time eval and treatment only   PT Predicted Duration/Target Date for Goal Attainment 10/14/23   PT Goals Bed Mobility;Transfers;Gait   PT: Bed Mobility Independent;Supine to/from sit;Within precautions   PT: Transfers Independent;Sit to/from stand;Within precautions   PT: Gait Independent;Greater than 200 feet;Within precautions   Therapeutic Activity   Therapeutic Activities: dynamic activities to improve functional performance Minutes (73776) 15   Symptoms Noted During/After Treatment Fatigue;Increased pain   Treatment Detail/Skilled Intervention PT: Educated pt on spinal precautions, pt verbalized understanding. Cues provided for improving comfort with positioning upon  discharge. Answered questions regarding household activities pt is able to complete with maintaining precautions. Pt amb an additional 100' x 2 with no AD independently, Supervision to monitor SpO2, noted on RA after ambulation >90%. Pt denied SOB.   PT Discharge Planning   PT Plan Disch   PT Rationale for DC Rec Recommend pt has assist for household tasks and ADLs as needed. Pt is able to complete mobility independently.   PT Brief overview of current status Pt independent with mobility.   Total Session Time   Timed Code Treatment Minutes 15   Total Session Time (sum of timed and untimed services) 25       Physical Therapy Discharge Summary    Reason for therapy discharge:    Discharged to home.    Progress towards therapy goal(s). See goals on Care Plan in [facility] electronic health record for goal details.  Goals met    Therapy recommendation(s):    No further therapy is recommended.

## 2023-10-11 NOTE — PLAN OF CARE
Shift: 10/10-10/11 8413-7945    Hx: Admitted 10/10 for C5-6 arthroplasty. Was supposed to be outpt but had low O2 levels- brought back to floor     Orientation: A&Ox4     Behavior/Aggression: green    Vitals/Tele: Tachycardic, on 1-2L NC- sats in low 90s, Hypertensive. MD aware      Pain: Scheduled tylenol and icepacks      Labs: Hemoglobin: 12.8      IV Access/drains: PIV     Diet: reg    Mobility: ind    GI/: Continent, good output     Wound/Skin: Incision on L side of neck, dressing CDI    Consults: n/a    Discharge Plan: tbd    Other: May need  for partner       See Flow sheets for assessment

## 2023-10-11 NOTE — PROGRESS NOTES
[facility]    HOSPITALIST PROGRESS NOTE :   --------------------------------------------------    Date of Admission:  10/10/2023    Cumulative Summary: Enmanuel Randall is a 53 year old male with a past medical history significant for spinal stenosis, tobacco use, recent GIB secondary to duodenal ulcer admitted on 10/10/2023 for observation after elective C5-6 arthoplasty due to post operative hypoxia.     Assessment & Plan     Enmanuel Randall is a 53 year old male with a past medical history significant for spinal stenosis, tobacco use, recent GIB secondary to duodenal ulcer admitted on 10/10/2023 for observation after elective C5-6 arthoplasty due to post operative hypoxia.      Post operative Acute resp hypoxia; resolved   Pt unable to be weaned to room air in PACU, requiring 2L NC at time of my evaluation. He is asymptomatic without any dyspnea, chest pain, palpitations, cough. Felt a bit dizzy the first time he got out of bed which has resolved. HR also elevated  though he is having some discomfort post op. Low clinical suspicion for PE at this time but remains a consideration. Lungs are clear on exam.   Hx tobacco use (quit one week before surgery) but no evidence of COPD on chest CT 8/2022. No known JOSIAS.     -- continue incentive spirometry after the dicharge   -- Hypoxia is improved and now on room air   -- Most likely sec to anesthesia , underlying COPD can't be completely excluded , will recommend out patient PFTs   -- checked EKG, showed sinus tachycardia      S/p C5-C6 arthoplasty for cervical radiculopathy   Procedure was performed by Dr. Salvador under general anesthesia with an EBL of 25ml. There were no intraoperative complications. In PACU pt was unable to wean off oxygen thus admission was requested for observation.     --post op cares per Neurosurgery including DVT prophylaxis and pain control   -- further therapy and follow up orders as per primary team     Hx UGIB  secondary to duodenal ulcer 9/2023  Hgb had improved pre op to 11.5.   Taking PPI regularly. No recurrent symptoms    --continue bid PPI on discharge , home reconciliation completed  --NO NSAIDS on discharge ( reviewed meds )       Clinically Significant Risk Factors Present on Admission                    # Acute Respiratory Failure: Documented O2 saturation < 91%.  Continue supplemental oxygen as needed     # Overweight: Estimated body mass index is 25.59 kg/m  as calculated from the following:    Height as of this encounter: 1.829 m (6').    Weight as of this encounter: 85.6 kg (188 lb 11.2 oz).              Diet: Diet  Advance Diet as Tolerated: Regular Diet Adult    Conklin Catheter: Not present  DVT Prophylaxis: Pneumatic Compression Devices  Code Status: Full Code    Medical Decision Making       30 MINUTES SPENT BY ME on the date of service doing chart review, history, exam, documentation & further activities per the note.      Disposition Plan   Per primary service , patient is ok to be discharged from medical point of view      Expected Discharge Date: 10/11/2023      Destination: home       Entered: Maria Isabel Mccarthy MD 10/11/2023, 8:05 AM       Maria Isabel Mccarthy MD, MD, FACP  Text Page (7am - 6pm)    ----------------------------------------------------------------------------------------------------------------------      Interval History   Patient consult care assumed, hypoxia is improved , no SOB or CP   Continue Incentive spirometer after the discharge for few days     -Data reviewed today: I reviewed all new labs and imaging results over the last 24 hours.    I personally reviewed no images or EKG's today.    Physical Exam   Temp: 97.6  F (36.4  C) Temp src: Oral BP: 98/57 Pulse: 105   Resp: 18 SpO2: 93 % O2 Device: Nasal cannula Oxygen Delivery: 1 LPM  Vitals:    10/10/23 0610   Weight: 85.6 kg (188 lb 11.2 oz)     Vital Signs with Ranges  Temp:  [97.6  F (36.4  C)-97.9  F (36.6  C)] 97.6  F (36.4  C)  Pulse:   [] 105  Resp:  [8-20] 18  BP: ()/() 98/57  SpO2:  [84 %-95 %] 93 %  I/O last 3 completed shifts:  In: 2300 [P.O.:700; I.V.:1600]  Out: 1850 [Urine:1850]    GENERAL: Alert , awake and oriented. NAD. Conversational, appropriate.   HEENT: Normocephalic. EOMI. No icterus or injection. Nares normal.   LUNGS: Clear to auscultation. No dyspnea at rest.   HEART: Regular rate. Extremities perfused.   ABDOMEN: Soft, nontender, and nondistended. Positive bowel sounds.   EXTREMITIES: No LE edema noted.   NEUROLOGIC: Moves extremities x4 on command. No acute focal neurologic abnormalities noted.     Medications      acetaminophen  975 mg Oral Q8H    pantoprazole  40 mg Oral BID    polyethylene glycol  17 g Oral Daily    rosuvastatin  20 mg Oral QPM    senna-docusate  1 tablet Oral BID    sodium chloride (PF)  3 mL Intracatheter Q8H       Data   Recent Labs   Lab 10/10/23  2052   WBC 12.3*   HGB 12.0*   MCV 85         POTASSIUM 4.4   CHLORIDE 100   CO2 21*   BUN 15.1   CR 0.95   ANIONGAP 14   LUCIA 8.9   *       Imaging:   Recent Results (from the past 24 hour(s))   XR Surgery BECKIE Fluoro Less Than 5 Min w Stills    Narrative    XR SURGERY BECKIE FLUORO LESS THAN 5 MIN W STILLS   10/10/2023 10:03 AM     HISTORY: C5-6 arthroplasty    COMPARISON: None.      Impression    IMPRESSION: Procedural fluoroscopy.    CAIT RODRIGUEZ MD         SYSTEM ID:  [id number]

## 2023-10-11 NOTE — PROGRESS NOTES
[facility]  Neurosurgery Daily Progress Note    Assessment & Plan   Procedure(s):  Cervical 5 to cervical 6 arthroplasty   -1 Day Post-Op    Patient was admitted for observation due to post op hypoxia.  Per Hospitalist notes- hypoxia resolved, patient tolerating room air, okay to discharge from medical standpoint.   Patient is doing well today. Reports improvement in pre op neck pain and left arm pain/weakness. Denies any new symptoms. Neuro exam stable. Incision CDI; no swelling, drainage, redness, or warmth.      Plan:  Discharge to home   Routine post op care plan  Routine wound care and post op activity restrictions  Pain medications prescribed  Follow-up with NSGY clinic as scheduled     Discussed with Dr. Modesto Cummings, CNP  [facility] Neurosurgery  [facility]  [address] [place] [id number]  Tel [phone]  Pager [phone]    Interval History   Stable     Physical Exam   Temp: 97.6  F (36.4  C) Temp src: Oral BP: 98/57 Pulse: 105   Resp: 18 SpO2: 92 % O2 Device: None (Room air) Oxygen Delivery: 1 LPM  Vitals:    10/10/23 0610   Weight: 85.6 kg (188 lb 11.2 oz)       Mental status:  Alert and oriented x 3, speech is fluent.  Motor:  Moves all extremities with appropriate strength.   Shoulder Abduction  Right:  5/5   Left:  5/5  Biceps                      Right:  5/5   Left:  5/5  Triceps                     Right:  5/5   Left:  5/5  Wrist Extensors        Right:  5/5   Left:  5/5  Wrist Flexors            Right:  5/5   Left:  5/5  Intrinsics                   Right:  5/5   Left:  5/5    Iliopsoas  (hip flexion)               Right: 5/5  Left:  5/5  Quadriceps  (knee extension)       Right:  5/5  Left:  5/5  Hamstrings  (knee flexion)            Right:  5/5  Left:  5/5  Gastroc Soleus  (PF)                          Right:  5/5  Left:  5/5  Tibialis Ant  (DF)                          Right:  5/5  Left:  5/5  EHL                           Right:  5/5  Left:  5/5    Sensation:  Intact to light touch   Incision: CDI. No swelling, drainage, warmth, or redness.     Medications       acetaminophen  975 mg Oral Q8H    pantoprazole  40 mg Oral BID    polyethylene glycol  17 g Oral Daily    rosuvastatin  20 mg Oral QPM    senna-docusate  1 tablet Oral BID    sodium chloride (PF)  3 mL Intracatheter Q8H

## 2023-10-11 NOTE — PROGRESS NOTES
Patient vital signs are at baseline: Yes, on 2L NC, gaol is wean Pt off O2 pror to discharge Pt with elevated HR and BP MD awared  Patient able to ambulate as they were prior to admission or with assist devices provided by therapies during their stay:  Yes. Up SBA  Patient MUST void prior to discharge:  Yes, voided adequately in the BR  Patient able to tolerate oral intake:  Yes, regular diet  Pain has adequate pain control using Oral analgesics:  Yes  Does patient have an identified :  Yes  Has goal D/C date and time been discussed with patient:  Yes Possible tomorrow if O2 stable    Patient A&Ox4. CMS intact. Anterior cervical incision DCI. Regular diet and tolerated. PIV SL with intermittent abx.

## 2023-10-12 LAB
ATRIAL RATE - MUSE: 103 BPM
DIASTOLIC BLOOD PRESSURE - MUSE: NORMAL MMHG
INTERPRETATION ECG - MUSE: NORMAL
P AXIS - MUSE: 38 DEGREES
PR INTERVAL - MUSE: 152 MS
QRS DURATION - MUSE: 86 MS
QT - MUSE: 356 MS
QTC - MUSE: 466 MS
R AXIS - MUSE: 0 DEGREES
SYSTOLIC BLOOD PRESSURE - MUSE: NORMAL MMHG
T AXIS - MUSE: 16 DEGREES
VENTRICULAR RATE- MUSE: 103 BPM

## 2023-10-23 ENCOUNTER — TELEPHONE (OUTPATIENT)
Dept: NEUROSURGERY | Facility: CLINIC | Age: 53
End: 2023-10-23
Payer: COMMERCIAL

## 2023-10-23 DIAGNOSIS — Z98.890 S/P CERVICAL DISC REPLACEMENT: Primary | ICD-10-CM

## 2023-10-23 RX ORDER — METHYLPREDNISOLONE 4 MG
TABLET, DOSE PACK ORAL
Qty: 21 TABLET | Refills: 0 | Status: SHIPPED | OUTPATIENT
Start: 2023-10-23 | End: 2023-11-21

## 2023-10-23 NOTE — TELEPHONE ENCOUNTER
Last Visit: 10/10/23 surgery date     Next Visit: 10/25/23 2 wk RN visit      Name of Provider:  Dr. Salvador     Assessment: S/p Cervical 5 to cervical 6 arthroplasty on 10/10/23 with Dr. Salvador.    Spoke to patient. Reports new and worsening symptoms since surgery that started saturday morning 10/21/23.    Reports twitching/shaking in his hands especially when picking up objects like a coffee mug. This is NEW post op. Also, reports NEW n/t in left arm. Subjective weakness in BUE. Bilateral shoulder pain but says he has a zinging/pulsating pain in left shoulder along with n/t. Said he can barely lift left arm over his head which he could do before these symptoms started.     Pain is manageable. Has not been taking oxy but over the weekend took prn d/t symptoms. Takes Flexeril TID.     Reports he mowed the lawn via riding lawnmower on Friday and woke up with new/worsening pain symptoms Saturday morning.      Denies any b/b incontinence, saddle anesthesia, foot drop/drag.    Recommendation given: Reiterated post op restrictions, will review with care team for possible MDP, possible MRI, any further recs.     Action needed from provider: Please review and advise on recommendations.

## 2023-10-23 NOTE — TELEPHONE ENCOUNTER
Attempted to reach out to patient, no answer. Left voice message for them to call clinic back to further discuss.      Last Visit: 10/10/23 surgery date    Next Visit: 10/25/23 2 wk RN visit     Name of Provider:  Dr. Salvador    Assessment: S/p Cervical 5 to cervical 6 arthroplasty on 10/10/23 with Dr. Salvador

## 2023-10-23 NOTE — TELEPHONE ENCOUNTER
[facility] Call Center    Phone Message    May a detailed message be left on voicemail: yes     Reason for Call: Other: patient calling today in regards to his surgery. Over the weekend he has lost strength in this are and feels tingling.      Action Taken: Other: message sent to team     Travel Screening: Not Applicable

## 2023-10-25 ENCOUNTER — VIRTUAL VISIT (OUTPATIENT)
Dept: NEUROSURGERY | Facility: CLINIC | Age: 53
End: 2023-10-25
Payer: COMMERCIAL

## 2023-10-25 ENCOUNTER — ANCILLARY PROCEDURE (OUTPATIENT)
Dept: GENERAL RADIOLOGY | Facility: CLINIC | Age: 53
End: 2023-10-25
Attending: NURSE PRACTITIONER
Payer: COMMERCIAL

## 2023-10-25 DIAGNOSIS — Z98.890 S/P CERVICAL DISC REPLACEMENT: Primary | ICD-10-CM

## 2023-10-25 DIAGNOSIS — Z98.890 S/P CERVICAL DISC REPLACEMENT: ICD-10-CM

## 2023-10-25 PROCEDURE — 99207 PR NO CHARGE NURSE ONLY: CPT

## 2023-10-25 PROCEDURE — 72040 X-RAY EXAM NECK SPINE 2-3 VW: CPT | Mod: TC | Performed by: RADIOLOGY

## 2023-10-25 NOTE — PROGRESS NOTES
Post-op Nurse Visit: Telephone Visit     Patient seen today per the order of  Jered Salvador MD .   DOS: 10/10/23  Procedure: Cervical 5 to cervical 6 arthroplasty     Pain/Neuro Assessment  Patient triaged on 10/23 due to worsening symptoms after mowing the lawn. MDP and XR recommended. XR completed this AM, report not written yet.     Patient reports continued left arm weakness which he feels is worse compared to pre-op, but has improved in the last 2 days after starting MDP. Pain is located in his neck and shoots into left shoulder. Pain is a 6/10.  Numbness/tingling: none  Strength: reports both arms continue to feel weak, but feels his left side is weaker than the other.     Pain Relief Measures:  Oxycodone: 1-2 tabs every 6ish hours   Tylenol: not using, recommended to mix in along with ibuprofen   Flexeril: TID  Ice: prn     Incision   Incision not inspected, virtual visit. Per patient, no redness, swelling, drainage, or warmth noted. He will try to send a photo via [employer].     Activity  Following restrictions, reviewed importance of activity restrictions after increased pain due to mowing lawn   Falls:  none  Patient is walking frequently without difficulty.   Denies redness, swelling, or warmth to bilateral calves.     GI/  Difficulty swallowing? No  Patient's appetite is normal  Bowel/bladder problems? No  Taking stool softeners? No not needed    Refills/x-rays/return to work  Refills given at this appointment? No  Sent for x-rays after this appointment? No  Ordered future x-rays? Yes  Scheduling team notified? 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 Patient was instructed to call with any additional questions/concerns.     Will update team on symptoms and request XR results.

## 2023-10-25 NOTE — PATIENT INSTRUCTIONS
Instructions for Patient    Incision   Keep your incision clean and dry at all times.   It is okay to shower, just pat the incision dry   No submerging incision in water such as pools, hot tubs, or baths for at least 8 weeks and until the incision is healed  Do not apply lotions or ointments to incision    Activity  No lifting greater than 10 pounds. Limited bending, twisting, or overhead reaching.  Walking is the best way to start exercise after surgery. Take short frequent walks. You may gradually increase the distance as tolerated. If you feel pain, decrease your activity, but DO NOT stop walking. Walking will help you gain strength, prevent muscle soreness and spasms, and prevent blood clots.   Avoid bed rest and prolonged sitting for longer than 30 minutes (change positions frequently while awake)  No contact sports or high impact activities such as; running/jogging, snowmobile or 4 zavala riding or any other recreational vehicles until after given clearance at one of your follow up visits    Medications   Refills of pain medication:   Please call the neurosurgery clinic to request 2-3 days before you run out  Weaning from narcotic pain medications  When it is time, start weaning by extending the time between doses.   For example, if you're taking 2 tabs every 4 hours, spread it out to 2 tabs over 4.5, 5, 6 hours. At that point you can certainly cut down to 1 tab, then wean to an as needed basis until completely done with them.Refills: call our clinic 2-3 business days before you are out of medication. A nurse will call you back to obtain a pain assessment.   Don't take more than 3000mg of Acetaminophen in 24 hours  Ok to begin taking Aspirin and NSAIDs (ex: ibuprofen/Advil)  Encouraged icing for at least 3-4 times throughout the day for 20-30 minutes at a time. Avoid heat to the incision area.   Taking stool softeners regularly can reduce constipation commonly caused by narcotic pain medications.    Contact  clinic or Emergency Room if you develop:   Infection (redness, swelling, warmth, drainage, fever over 101 F)  New injury  Bladder or bowel changes or loss of control    Signs of blood clot:  Swelling and/or warmth in one or both legs  Pain or tenderness in your leg, ankle, foot, or arm   Red or discolored skin     Go to the Emergency Room   If sudden onset of severe headache, weakness, confusion, change in level of consciousness, pain, or loss of movement.  Chest pain  Trouble breathing     Post-operative appointments  Arrive 30 minutes before your 6 week and 3 months post-op appointments to allow time for an x-ray before each    [facility]  [place] [address] [place] [id number]  Telephone:  [phone]   Fax:  [phone]

## 2023-10-27 DIAGNOSIS — M48.02 SPINAL STENOSIS IN CERVICAL REGION: ICD-10-CM

## 2023-10-27 DIAGNOSIS — Z98.890 S/P CERVICAL DISC REPLACEMENT: ICD-10-CM

## 2023-10-27 RX ORDER — OXYCODONE HYDROCHLORIDE 5 MG/1
5-10 TABLET ORAL EVERY 6 HOURS PRN
Qty: 40 TABLET | Refills: 0 | Status: SHIPPED | OUTPATIENT
Start: 2023-10-27 | End: 2023-11-21

## 2023-10-27 RX ORDER — CYCLOBENZAPRINE HCL 10 MG
10 TABLET ORAL 3 TIMES DAILY PRN
Qty: 40 TABLET | Refills: 0 | Status: SHIPPED | OUTPATIENT
Start: 2023-10-27

## 2023-10-27 NOTE — TELEPHONE ENCOUNTER
[facility] Call Center    Phone Message    May a detailed message be left on voicemail: yes     Reason for Call: Other:  called pt and he would like to speak with her or a team member about his plan of care. He wants to talk about ht pain killers.      Action Taken: Other: message sent to team     Travel Screening: Not Applicable

## 2023-10-27 NOTE — PROGRESS NOTES
Gladys Cummings CNP has reviewed XR.     Gladys stated XR shows stable hardware. She is recommending patient continue with MDP and also stated follow-up appt with MARTHA can be moved up to evaluate symptoms/exam in person.     Attempted to reach out to patient, no answer. Left voice message for them to call clinic back to further discuss.

## 2023-10-27 NOTE — TELEPHONE ENCOUNTER
"Patient calling for a refill of oxycodone and flexeril.     DOS: 10/10/23  Procedure: Cervical 5 to cervical 6 arthroplasty    Surgeon: Dr. Salvador  Weeks Post Op: 2 weeks 3 days       Last fill: flexeril 10/10 #40, oxy 10/10 #40  Next visit: 11/21    Patient calling to discuss symptoms and also request a refill.     Patient is almost done with the MDP and reports there has been no more improvement, he thinks it may be getting worse.     He endorses continued LUE weakness which sounds like it may be caused by pain. He is able to grasp things with his left hand and move his LUE normally. He notices what he is describing as weakness when he lifts his left arm up to grab things such as a plate from his cupboard. This causes shooting pains to go from his neck and into his left shoulder. The pain does not travel down the left arm, just the back side of his L shoulder.     Patient states the pain/weakness feels like a \"nerve being pinched\". He has also noticed intermittent n/t on the left side of his neck and on his left jaw line, usually when waking up. He states he thinks it could be related to \"his medications wearing off\". His pain is currently a 4/10.     Patient is wondering what should be done for the continued and potentially worsening pain/weakness in his LUE. Reviewed that XR was reviewed by Gladys Cummings CNP and showed stable hardware. It was recommended he move up his follow-up appt with an MARTHA, will see if there are sooner appointments.             "

## 2023-10-28 ENCOUNTER — APPOINTMENT (OUTPATIENT)
Dept: MRI IMAGING | Facility: CLINIC | Age: 53
End: 2023-10-28
Attending: EMERGENCY MEDICINE
Payer: COMMERCIAL

## 2023-10-28 ENCOUNTER — HOSPITAL ENCOUNTER (EMERGENCY)
Facility: CLINIC | Age: 53
Discharge: HOME | End: 2023-10-28
Admitting: EMERGENCY MEDICINE
Payer: COMMERCIAL

## 2023-10-28 VITALS
RESPIRATION RATE: 18 BRPM | OXYGEN SATURATION: 95 % | SYSTOLIC BLOOD PRESSURE: 151 MMHG | DIASTOLIC BLOOD PRESSURE: 106 MMHG | TEMPERATURE: 98.4 F | HEIGHT: 72 IN | HEART RATE: 73 BPM | BODY MASS INDEX: 25.06 KG/M2 | WEIGHT: 185 LBS

## 2023-10-28 DIAGNOSIS — G89.18 ACUTE POST-OPERATIVE PAIN: ICD-10-CM

## 2023-10-28 DIAGNOSIS — M54.2 NECK PAIN: ICD-10-CM

## 2023-10-28 LAB
ANION GAP SERPL CALCULATED.3IONS-SCNC: 12 MMOL/L (ref 7–15)
BASOPHILS # BLD AUTO: 0.1 10E3/UL (ref 0–0.2)
BASOPHILS NFR BLD AUTO: 1 %
BUN SERPL-MCNC: 16.7 MG/DL (ref 6–20)
CALCIUM SERPL-MCNC: 9.5 MG/DL (ref 8.6–10)
CHLORIDE SERPL-SCNC: 100 MMOL/L (ref 98–107)
CREAT SERPL-MCNC: 0.99 MG/DL (ref 0.67–1.17)
DEPRECATED HCO3 PLAS-SCNC: 25 MMOL/L (ref 22–29)
EGFRCR SERPLBLD CKD-EPI 2021: >90 ML/MIN/1.73M2
EOSINOPHIL # BLD AUTO: 0.2 10E3/UL (ref 0–0.7)
EOSINOPHIL NFR BLD AUTO: 2 %
ERYTHROCYTE [DISTWIDTH] IN BLOOD BY AUTOMATED COUNT: 13.6 % (ref 10–15)
GLUCOSE SERPL-MCNC: 120 MG/DL (ref 70–99)
HCT VFR BLD AUTO: 42.3 % (ref 40–53)
HGB BLD-MCNC: 13.6 G/DL (ref 13.3–17.7)
IMM GRANULOCYTES # BLD: 0 10E3/UL
IMM GRANULOCYTES NFR BLD: 0 %
LYMPHOCYTES # BLD AUTO: 2.5 10E3/UL (ref 0.8–5.3)
LYMPHOCYTES NFR BLD AUTO: 28 %
MAGNESIUM SERPL-MCNC: 2.2 MG/DL (ref 1.7–2.3)
MCH RBC QN AUTO: 26.5 PG (ref 26.5–33)
MCHC RBC AUTO-ENTMCNC: 32.2 G/DL (ref 31.5–36.5)
MCV RBC AUTO: 82 FL (ref 78–100)
MONOCYTES # BLD AUTO: 0.6 10E3/UL (ref 0–1.3)
MONOCYTES NFR BLD AUTO: 7 %
NEUTROPHILS # BLD AUTO: 5.4 10E3/UL (ref 1.6–8.3)
NEUTROPHILS NFR BLD AUTO: 62 %
NRBC # BLD AUTO: 0 10E3/UL
NRBC BLD AUTO-RTO: 0 /100
PLATELET # BLD AUTO: 288 10E3/UL (ref 150–450)
POTASSIUM SERPL-SCNC: 4 MMOL/L (ref 3.4–5.3)
RBC # BLD AUTO: 5.14 10E6/UL (ref 4.4–5.9)
SODIUM SERPL-SCNC: 137 MMOL/L (ref 135–145)
WBC # BLD AUTO: 8.7 10E3/UL (ref 4–11)

## 2023-10-28 PROCEDURE — 85014 HEMATOCRIT: CPT | Performed by: EMERGENCY MEDICINE

## 2023-10-28 PROCEDURE — 255N000002 HC RX 255 OP 636: Mod: JZ | Performed by: STUDENT IN AN ORGANIZED HEALTH CARE EDUCATION/TRAINING PROGRAM

## 2023-10-28 PROCEDURE — 250N000011 HC RX IP 250 OP 636: Performed by: EMERGENCY MEDICINE

## 2023-10-28 PROCEDURE — 36415 COLL VENOUS BLD VENIPUNCTURE: CPT | Performed by: EMERGENCY MEDICINE

## 2023-10-28 PROCEDURE — 72156 MRI NECK SPINE W/O & W/DYE: CPT

## 2023-10-28 PROCEDURE — 83735 ASSAY OF MAGNESIUM: CPT | Performed by: EMERGENCY MEDICINE

## 2023-10-28 PROCEDURE — 80048 BASIC METABOLIC PNL TOTAL CA: CPT | Performed by: EMERGENCY MEDICINE

## 2023-10-28 PROCEDURE — 99285 EMERGENCY DEPT VISIT HI MDM: CPT | Mod: 25

## 2023-10-28 PROCEDURE — 96374 THER/PROPH/DIAG INJ IV PUSH: CPT | Mod: 59

## 2023-10-28 PROCEDURE — A9585 GADOBUTROL INJECTION: HCPCS | Mod: JZ | Performed by: STUDENT IN AN ORGANIZED HEALTH CARE EDUCATION/TRAINING PROGRAM

## 2023-10-28 RX ORDER — GADOBUTROL 604.72 MG/ML
8.5 INJECTION INTRAVENOUS ONCE
Status: COMPLETED | OUTPATIENT
Start: 2023-10-28 | End: 2023-10-28

## 2023-10-28 RX ORDER — HYDROMORPHONE HYDROCHLORIDE 1 MG/ML
0.5 INJECTION, SOLUTION INTRAMUSCULAR; INTRAVENOUS; SUBCUTANEOUS ONCE
Status: COMPLETED | OUTPATIENT
Start: 2023-10-28 | End: 2023-10-28

## 2023-10-28 RX ADMIN — GADOBUTROL 8.5 ML: 604.72 INJECTION INTRAVENOUS at 14:42

## 2023-10-28 RX ADMIN — HYDROMORPHONE HYDROCHLORIDE 0.5 MG: 1 INJECTION, SOLUTION INTRAMUSCULAR; INTRAVENOUS; SUBCUTANEOUS at 12:37

## 2023-10-28 ASSESSMENT — ENCOUNTER SYMPTOMS
ABDOMINAL PAIN: 0
NAUSEA: 0
FEVER: 0
SHORTNESS OF BREATH: 0
VOMITING: 0
ARTHRALGIAS: 1
NECK PAIN: 1
CHILLS: 0
WEAKNESS: 1

## 2023-10-28 ASSESSMENT — ACTIVITIES OF DAILY LIVING (ADL)
ADLS_ACUITY_SCORE: 35
ADLS_ACUITY_SCORE: 35

## 2023-10-28 NOTE — DISCHARGE INSTRUCTIONS
You were seen here today for evaluation of neck pain.  Your lab work today is reassuring with no evidence of infection in your blood or electrolyte problems.  Your MRI does not show any concerning findings related to your recent surgeries.  You do have some narrowing at C3/C4 which could be contributing to your pain.    Take Tylenol 1000 mg 3 times daily.  Take Flexeril 3 times daily.  Take oxycodone 1 to 2 tablets every 4-6 hours as previously discussed with your neurosurgery team.    Follow-up with your neurosurgery team on Monday at 3:00 PM in the [facility].    You may return here if needed for any new or worsening symptoms however if you are able to, it might be best for you to go to [facility] so that your neurosurgery team could see you if you need to be admitted to the hospital. If you can't make it to [facility], please return here for severe pain, fever, loss of sensation or function in your hand, vomiting, chest pain, sob, or any other symptoms that concern you.

## 2023-10-28 NOTE — ED TRIAGE NOTES
Patient had disc replacement surgery 2 weeks ago, C5&6. Current symptoms started Monday of shooting pain going down left arm. Patient did speak with his provider but it was a phone consult that said the xray looked good and the device seemed stable. Is taking his meds as prescribed but they are not working.    Pain in arm is 6/10 sitting still and increases with movement. Decrease in strength with left arm and hand since Monday.

## 2023-10-28 NOTE — ED PROVIDER NOTES
EMERGENCY DEPARTMENT ENCOUNTER      NAME: Enmanuel Randall  AGE: 53 year old male  YOB: 1970  MRN: [id number]  EVALUATION DATE & TIME: 10/28/2023 11:20 AM    PCP: Ignacio Miller    ED PROVIDER: Celia Ortiz PA-C      Chief Complaint   Patient presents with    Neck Pain    Arm Pain     left         FINAL IMPRESSION:  1. Neck pain    2. Acute post-operative pain          ED COURSE & MEDICAL DECISION MAKING:    Pertinent Labs & Imaging studies reviewed. (See chart for details)    53 year old male presents to the Emergency Department for evaluation of neck pain and left arm weakness.    Physical exam is remarkable for a generally well-appearing male who is in no acute distress.  Heart and lung sounds are clear diffusely throughout.  Abdomen is soft and nontender.  He has no midline spinal tenderness or step-offs.  He endorses sudden, shooting pain with attempts to flex at the left shoulder and abduct the left shoulder-range of motion is severely limited secondary to pain.  He has normal range of motion of the left elbow and wrist, good distal sensation in the fingers, capillary refill is less than 2 seconds.  Strong radial pulse identified.   strength is 3 out of 5 in the left compared with 5 out of 5 on the right; he has weakness on the left side with internal rotation of the shoulder against resistance, normal strength with external rotation against resistance. He has an incision in the left anterior neck which appears to be healing well, there is no surrounding erythema, warmth, swelling, or discharge.  Vital signs remarkable for hypertension but otherwise stable and he is afebrile.    CBC is unremarkable with no leukocytosis or anemia.  BMP is unremarkable with no significant electrolyte derangements, normal kidney function. Magnesium within normal limits.  MRI with and without contrast shows intact hardware at the C5-C7 levels; he does have foraminal narrowing with some compression at the C3-C4  level with possible abnormal cord signal.      The patient was given Dilaudid for treatment of his pain.  I do not think any further emergent labs or imaging are indicated at this time.  The patient's pain was well controlled here after 1 dose of IV medications and though he does have some weakness, he does not have any other neurosensory deficits on my exam.  He denies any chest pain, shortness of breath, or abdominal pain concerning for other cause of his symptoms.  He does not have any fevers or evidence of incision infection.  The patient's care and presentation was discussed with on-call neurosurgery both before and after his imaging, they state that if his pain is controlled he is free to go home.  They will plan to see him in clinic on Monday for repeat evaluation, the patient was made aware of this.  Advised him to return here if needed for new or worsening symptoms, also discussed with the patient that if he is able to and he is having worsening symptoms he may be best suited to present to [facility] ER since that is where his neurosurgical team practices however I did emphasize that he can return here at any time if needed.  Patient is agreeable with this treatment plan and verbalized his understanding.      Medical Decision Making    History:  Supplemental history from: Documented in chart, if applicable  External Record(s) reviewed: Outpatient Record: [facility] hospitalization and clinic visits with neurosurgery    Work Up:  Chart documentation includes differential considered and any EKGs or imaging independently interpreted by provider, where specified.  In additional to work up documented, I considered the following work up: Documented in chart, if applicable.    External consultation:  Discussion of management with another provider: Neurosurgery    Complicating factors:  Care impacted by chronic illness: N/A  Care affected by social determinants of health: N/A    Disposition considerations: Discharge.  I recommended the patient continue their current prescription strength medication(s): oxycodone and flexeril. I considered admission, but discharged patient after significant clinical improvement.    ED Course   11:31 AM Performed my initial history and physical exam. Discussed workup in the emergency department, management of symptoms, and likely disposition.   12:10 PM Discussed with on-call neurosurgery MARTHA.  1:45 PM Rechecked patient.  3:48 PM Spoke with on-call neurosurgery MARTHA.   3:56 PM Discussed with José neurosurgery MARTHA.   3:59 PM I discussed the plan for discharge with the patient or family and they are agreeable.. We discussed supportive cares at home and reasons for return to the ER including new or worsening symptoms - all questions and concerns addressed. Patient to be discharged by RN.    At the conclusion of the encounter I discussed the results of all of the tests and the disposition. The questions were answered. The patient or family acknowledged understanding and was agreeable with the care plan.     Voice recognition software was used in the creation of this note. Any grammatical or nonsensical errors are due to inherent errors with the software and are not the intention of the writer.     MEDICATIONS GIVEN IN THE EMERGENCY:  Medications   HYDROmorphone (PF) (DILAUDID) injection 0.5 mg (0.5 mg Intravenous $Given 10/28/23 1237)   gadobutrol (GADAVIST) injection 8.5 mL (8.5 mLs Intravenous $Given 10/28/23 1442)       NEW PRESCRIPTIONS STARTED AT TODAY'S ER VISIT  New Prescriptions    No medications on file            =================================================================    HPI    Patient information was obtained from: Patient    Use of : N/A         Enmanuel Randall is a 53 year old male with past medical history of duodenal ulcer, upper GI bleed, cervical stenosis who presents to the ED via walk-in for evaluation of left neck pain and arm weakness.    Per chart review, the  patient had a cervical disc replacement procedure at [facility] with Dr. Salvador on 10/10/2023.  He was seen for a virtual visit in clinic on 10/25/2023 and was prescribed Medrol Dosepak for increased pain.  He had x-rays at that time which were reportedly normal.    The patient presents today for evaluation of left shoulder pain.  He states that his pain started to increase 6 days ago but seemed to improve after he was prescribed a Medrol Dosepak.  However, he states that since his Medrol Dosepak doses have been decreasing, the pain has been increasing.  He describes it as sharp and shooting and notes that it radiates from his neck to his left shoulder and underneath his scapula.  It is worse with certain movements and better with holding in position.  He has been taking 1-2 oxycodone every 6 hours, 2 extra strength Tylenol twice a day, and 1 Flexeril 3 times a day for his symptoms without any improvement.  He has weakness in the left arm which was present prior to surgery but seemed to have improved right away after the surgery then returned.    He denies fevers, chills, nausea, vomiting, chest pain, or shortness of breath.      REVIEW OF SYSTEMS   Review of Systems   Constitutional:  Negative for chills and fever.   Respiratory:  Negative for shortness of breath.    Cardiovascular:  Negative for chest pain.   Gastrointestinal:  Negative for abdominal pain, nausea and vomiting.   Musculoskeletal:  Positive for arthralgias (Left shoulder) and neck pain.   Neurological:  Positive for weakness.       All other systems reviewed and are negative unless noted in HPI.      PAST MEDICAL HISTORY:  Past Medical History:   Diagnosis Date    Cervical radiculopathy     Duodenal ulcer 09/22/2023    Dyslipidemia     ED (erectile dysfunction)     Seborrheic dermatitis     Spinal stenosis in cervical region     Tobacco use disorder     Upper GI bleed        PAST SURGICAL HISTORY:  Past Surgical History:   Procedure Laterality  Date    BACK SURGERY      C4-C5 surgery    COLONOSCOPY      ESOPHAGOSCOPY, GASTROSCOPY, DUODENOSCOPY (EGD), COMBINED N/A 2023    Procedure: ESOPHAGOGASTRODUODENOSCOPY with EPINEPHRINE INJECTION and CAUTERY and BIOPSY;  Surgeon: Eliazar Leyva MD;  Location: [facility] OR    HERNIA REPAIR Left     inguinal hernia repair    REPLACE DISK CERVICAL ANTERIOR N/A 10/10/2023    Procedure: Cervical 5 to cervical 6 arthroplasty;  Surgeon: Jered Salvador MD;  Location:  OR       CURRENT MEDICATIONS:    cyclobenzaprine (FLEXERIL) 10 MG tablet  loratadine (CLARITIN) 10 MG tablet  methylPREDNISolone (MEDROL DOSEPAK) 4 MG tablet therapy pack  oxyCODONE (ROXICODONE) 5 MG tablet  pantoprazole (PROTONIX) 40 MG EC tablet  rosuvastatin (CRESTOR) 20 MG tablet  senna-docusate (SENOKOT-S/PERICOLACE) 8.6-50 MG tablet  sildenafil (VIAGRA) 100 MG tablet  Turmeric (QC TUMERIC COMPLEX PO)  Vitamin D3 (VITAMIN D, CHOLECALCIFEROL,) 25 mcg (1000 units) tablet        ALLERGIES:  Allergies   Allergen Reactions    Bupropion Hives and Other (See Comments)     Other reaction(s): psych problems    Fentanyl Hives and Itching    Penicillins Hives     Tolerated amoxicillin       FAMILY HISTORY:  No family history on file.    SOCIAL HISTORY:   Social History     Socioeconomic History    Marital status:    Tobacco Use    Smoking status: Former     Packs/day: 1.00     Years: 10.00     Additional pack years: 0.00     Total pack years: 10.00     Types: Cigarettes     Quit date: 10/2/2023     Years since quittin.0    Smokeless tobacco: Never   Vaping Use    Vaping Use: Never used   Substance and Sexual Activity    Alcohol use: Not Currently    Drug use: Never       VITALS:  Patient Vitals for the past 24 hrs:   BP Temp Temp src Pulse Resp SpO2 Height Weight   10/28/23 1600 (!) 152/106 -- -- 81 -- 93 % -- --   10/28/23 1550 (!) 145/101 -- -- 78 -- 94 % -- --   10/28/23 1530 (!) 140/97 -- -- 72 -- 92 % -- --   10/28/23 1526  (!) 140/100 -- -- 78 -- 93 % -- --   10/28/23 1345 (!) 147/101 -- -- -- -- -- -- --   10/28/23 1330 (!) 138/96 -- -- -- -- -- -- --   10/28/23 1315 (!) 142/101 -- -- 79 18 92 % -- --   10/28/23 1245 (!) 161/105 -- -- 79 -- 93 % -- --   10/28/23 1241 -- -- -- 83 -- 93 % -- --   10/28/23 1240 (!) 164/106 -- -- 83 -- 93 % -- --   10/28/23 1239 -- -- -- -- -- 95 % -- --   10/28/23 1120 -- -- -- -- -- -- 1.829 m (6') 83.9 kg (185 lb)   10/28/23 1119 -- 98.4  F (36.9  C) Temporal -- -- -- -- --   10/28/23 1118 (!) 155/109 -- -- 84 18 99 % -- --       PHYSICAL EXAM    VITAL SIGNS: BP (!) 152/106   Pulse 81   Temp 98.4  F (36.9  C) (Temporal)   Resp 18   Ht 1.829 m (6')   Wt 83.9 kg (185 lb)   SpO2 93%   BMI 25.09 kg/m    General Appearance: Alert, cooperative, normal speech and facial symmetry, appears stated age, the patient does not appear in distress  Head:  Normocephalic, without obvious abnormality, atraumatic  Cardio:  Regular rate and rhythm, S1 and S2 normal, no murmur, rub    or gallop, 2+ pulses symmetric in all extremities  Pulm:  Clear to auscultation bilaterally, respirations unlabored with no accessory muscle use  Abdomen:  Abdomen is soft, non-distended with no tenderness to palpation, rebound tenderness, or guarding.   Back: No midline tenderness or step-offs, no CVA tenderness  Extremities: Sudden, shooting pain with attempts to flex at the left shoulder and abduct the left shoulder-range of motion is severely limited secondary to pain.  He has normal range of motion of the left elbow and wrist, good distal sensation in the fingers, capillary refill is less than 2 seconds.  Strong radial pulse identified.   strength is 3 out of 5 in the left compared with 5 out of 5 on the right; he has weakness on the left side with internal rotation of the shoulder against resistance, normal strength with external rotation against resistance  Skin: Incision on the left anterior neck with no surrounding  erythema, warmth, or discharge; no associated tenderness to palpation  Neuro: Patient is awake, alert, and responsive to voice. No gross motor weaknesses or sensory loss; moves all extremities.    LAB:  All pertinent labs reviewed and interpreted.  Labs Ordered and Resulted from Time of ED Arrival to Time of ED Departure   BASIC METABOLIC PANEL - Abnormal       Result Value    Sodium 137      Potassium 4.0      Chloride 100      Carbon Dioxide (CO2) 25      Anion Gap 12      Urea Nitrogen 16.7      Creatinine 0.99      GFR Estimate >90      Calcium 9.5      Glucose 120 (*)    MAGNESIUM - Normal    Magnesium 2.2     CBC WITH PLATELETS AND DIFFERENTIAL    WBC Count 8.7      RBC Count 5.14      Hemoglobin 13.6      Hematocrit 42.3      MCV 82      MCH 26.5      MCHC 32.2      RDW 13.6      Platelet Count 288      % Neutrophils 62      % Lymphocytes 28      % Monocytes 7      % Eosinophils 2      % Basophils 1      % Immature Granulocytes 0      NRBCs per 100 WBC 0      Absolute Neutrophils 5.4      Absolute Lymphocytes 2.5      Absolute Monocytes 0.6      Absolute Eosinophils 0.2      Absolute Basophils 0.1      Absolute Immature Granulocytes 0.0      Absolute NRBCs 0.0         RADIOLOGY:  Reviewed all pertinent imaging. Please see official radiology report.  MR Cervical Spine w/o & w Contrast   Final Result   IMPRESSION:   1.  C5-C6 and C6-C7 interbody disc grafts. Severe left neural foraminal narrowing at C3-C4. Disc protrusion at this level indents the ventral cord with questionable increased cord signal abnormality.               Celia Ortiz PA-C  Emergency Medicine  [facility] EMERGENCY ROOM  [address] [facility] [id number] [phone]  Dept: [phone]       Celia Ortiz PA-C  10/28/23 8589

## 2023-10-28 NOTE — PROGRESS NOTES
Neurosurgery note    Patient presented to the [facility] emergency department today with neck pain left-sided scapular pain and pain shooting down his left arm.  He has been in contact with neurosurgery clinic since his surgery, tried a medrol dose pack without improvement, which was 2 and half weeks ago, C5-6 cervical arthroplasty (history of C6-7 cervical arthroplasty).  ER PA indicates patient was feeling a bit better the first few days after surgery then his symptoms returned.  In the ER he obtain a cervical MRI, results as below.    IMPRESSION:  1.  C5-C6 and C6-C7 interbody disc grafts. Severe left neural foraminal narrowing at C3-C4. Disc protrusion at this level indents the ventral cord with questionable increased cord signal abnormality.      ER provider indicates the patient symptoms of weakness are reported by the patient to be similar to what he had before surgery.  Weakness in his left arm.      Patient's pain was able to be managed with IV Dilaudid, and he felt comfortable discharging home and following up in clinic on Monday with Dr. Salvador.

## 2023-10-30 ENCOUNTER — TELEPHONE (OUTPATIENT)
Dept: NEUROSURGERY | Facility: CLINIC | Age: 53
End: 2023-10-30

## 2023-10-30 DIAGNOSIS — Z98.890 S/P CERVICAL DISC REPLACEMENT: Primary | ICD-10-CM

## 2023-10-30 NOTE — TELEPHONE ENCOUNTER
Patient seen in the ED over the weekend and an update was sent to Dr. Salvador by Rachel Villatoro PA-C on patients symptoms.     Patient is scheduled to see Dr. Salvador on 11/2 to further discuss.     Received message from Dr. Salvador requesting we call patient to check in on symptoms. Dr. Salvador stated if patient is feeling better it is ok for him to continue to monitor for now.     Called patient-    Attempted to reach out to patient, no answer. Left voice message for them to call clinic back to further discuss.

## 2023-11-02 ENCOUNTER — ANCILLARY PROCEDURE (OUTPATIENT)
Dept: GENERAL RADIOLOGY | Facility: CLINIC | Age: 53
End: 2023-11-02
Attending: NEUROLOGICAL SURGERY
Payer: COMMERCIAL

## 2023-11-02 ENCOUNTER — TELEPHONE (OUTPATIENT)
Dept: NEUROSURGERY | Facility: CLINIC | Age: 53
End: 2023-11-02

## 2023-11-02 ENCOUNTER — OFFICE VISIT (OUTPATIENT)
Dept: NEUROSURGERY | Facility: CLINIC | Age: 53
End: 2023-11-02
Payer: COMMERCIAL

## 2023-11-02 VITALS — OXYGEN SATURATION: 95 % | SYSTOLIC BLOOD PRESSURE: 138 MMHG | DIASTOLIC BLOOD PRESSURE: 94 MMHG | HEART RATE: 90 BPM

## 2023-11-02 DIAGNOSIS — Z98.890 S/P CERVICAL DISC REPLACEMENT: Primary | ICD-10-CM

## 2023-11-02 DIAGNOSIS — Z98.890 S/P CERVICAL DISC REPLACEMENT: ICD-10-CM

## 2023-11-02 PROCEDURE — 72040 X-RAY EXAM NECK SPINE 2-3 VW: CPT | Mod: TC | Performed by: RADIOLOGY

## 2023-11-02 PROCEDURE — 99024 POSTOP FOLLOW-UP VISIT: CPT | Performed by: NEUROLOGICAL SURGERY

## 2023-11-02 ASSESSMENT — PAIN SCALES - GENERAL: PAINLEVEL: EXTREME PAIN (8)

## 2023-11-02 NOTE — TELEPHONE ENCOUNTER
TERRIE for return call.    Verbal orders for chest x-ray placed per Dr. Salvador (recommendation noted in today's x-ray report). Orders placed in separate encounter.    Joycelyn Pacheco RN on 11/2/2023 at 2:02 PM

## 2023-11-02 NOTE — NURSING NOTE
Enmanuel Randall is a 53 year old male who presents for:  Chief Complaint   Patient presents with    RECHECK     Shooting pain in left shoulder without the meds 8        Initial Vitals:  BP (!) 138/94   Pulse 90   SpO2 95%  Estimated body mass index is 25.09 kg/m  as calculated from the following:    Height as of 10/28/23: 6' (1.829 m).    Weight as of 10/28/23: 185 lb (83.9 kg).. There is no height or weight on file to calculate BSA. BP completed using cuff size: regular  Extreme Pain (8)    Nursing Comments:     Jason Crespo

## 2023-11-02 NOTE — LETTER
11/2/2023         RE: Enmanuel Randall  [address] [place] [address]        Dear Colleague,    Thank you for referring your patient, Enmanuel Randall, to the [facility]. Please see a copy of my visit note below.    3 wks post-op from C5-6 arthroplasty.  Presented to ED this weekend for severe left shoulder pain.  Pain is now somewhat improved on pain meds.  Continued throbbing to the left shoulder however.  MR was obtained for eval, which showed post-op changes at C5-6 with resolution of disc herniation and improved stenosis, some residual left foraminal stenosis, and spondylotic change at C3-4 and C4-5, with C3-4 stenosis and possible slight cord signal change and C4-5 left foraminal stenosis.       Past Medical History:   Diagnosis Date     Cervical radiculopathy      Duodenal ulcer 09/22/2023     Dyslipidemia      ED (erectile dysfunction)      Seborrheic dermatitis      Spinal stenosis in cervical region      Tobacco use disorder      Upper GI bleed      Past Surgical History:   Procedure Laterality Date     BACK SURGERY      C4-C5 surgery     COLONOSCOPY       ESOPHAGOSCOPY, GASTROSCOPY, DUODENOSCOPY (EGD), COMBINED N/A 09/03/2023    Procedure: ESOPHAGOGASTRODUODENOSCOPY with EPINEPHRINE INJECTION and CAUTERY and BIOPSY;  Surgeon: Eliazar Leyva MD;  Location: [facility] Main OR     HERNIA REPAIR Left     inguinal hernia repair     REPLACE DISK CERVICAL ANTERIOR N/A 10/10/2023    Procedure: Cervical 5 to cervical 6 arthroplasty;  Surgeon: Jered Salvador MD;  Location:  OR     Social History     Socioeconomic History     Marital status:      Spouse name: Not on file     Number of children: Not on file     Years of education: Not on file     Highest education level: Not on file   Occupational History     Not on file   Tobacco Use     Smoking status: Former     Packs/day: 1.00     Years: 10.00     Additional pack years: 0.00     Total pack years: 10.00      Types: Cigarettes     Quit date: 10/2/2023     Years since quittin.0     Smokeless tobacco: Never   Vaping Use     Vaping Use: Never used   Substance and Sexual Activity     Alcohol use: Not Currently     Drug use: Never     Sexual activity: Not on file   Other Topics Concern     Not on file   Social History Narrative     Not on file     Social Determinants of Health     Financial Resource Strain: Not on file   Food Insecurity: Not on file   Transportation Needs: Not on file   Physical Activity: Not on file   Stress: Not on file   Social Connections: Not on file   Interpersonal Safety: Not on file   Housing Stability: Not on file     No family history on file.     ROS: 10 point ROS neg other than the symptoms noted above in the HPI.    Physical Exam  BP (!) 138/94   Pulse 90   SpO2 95%   HEENT:  Normocephalic, atraumatic.  PERRLA.  EOM s intact.  Visual fields full to gross exam  Neck:  Supple, non-tender, without lymphadenopathy.  Heart:  No peripheral edema  Lungs:  No SOB  Abdomen:  Non-distended.   Skin:  Warm and dry.  Extremities:  No edema, cyanosis or clubbing.  Psychiatric:  No apparent distress  Musculoskeletal:  Normal bulk and tone    NEUROLOGICAL EXAMINATION:     Mental status:  Alert and Oriented x 3, speech is fluent.  Cranial nerves:  II-XII intact.   Motor:    Shoulder Abduction:  Right:  5/5   Left:  5/5  Biceps:                      Right:  5/5   Left:  5/5  Triceps:                     Right:  5/5   Left:  5/5  Wrist Extensors:       Right:  5/5   Left:  5/5  Wrist Flexors:           Right:  5/5   Left:  5/5  interosseus :            Right:  5/5   Left:  5/5  Hip Flexor:                Right: 5/5  Left:  5/5  Quadriceps:             Right:  5/5  Left:  5/5  Hamstrings:             Right:  5/5  Left:  5/5  Gastroc Soleus:        Right:  5/5  Left:  5/5  Tib/Ant:                      Right:  5/5  Left:  5/5  EHL:                     Right:  5/5  Left:  5/5  Sensation:  Intact  Reflexes:   Negative Babinski.  Negative Clonus.  Negative Cobian's.  Coordination:  Smooth finger to nose testing.   Negative pronator drift.  Smooth tandem walking.  Incision well healed    A/P:  XRs stable  Continue pain management and routine follow up       Again, thank you for allowing me to participate in the care of your patient.        Sincerely,        Jered Salvador MD

## 2023-11-02 NOTE — PROGRESS NOTES
3 wks post-op from C5-6 arthroplasty.  Presented to ED this weekend for severe left shoulder pain.  Pain is now somewhat improved on pain meds.  Continued throbbing to the left shoulder however.  MR was obtained for eval, which showed post-op changes at C5-6 with resolution of disc herniation and improved stenosis, some residual left foraminal stenosis, and spondylotic change at C3-4 and C4-5, with C3-4 stenosis and possible slight cord signal change and C4-5 left foraminal stenosis.       Past Medical History:   Diagnosis Date    Cervical radiculopathy     Duodenal ulcer 2023    Dyslipidemia     ED (erectile dysfunction)     Seborrheic dermatitis     Spinal stenosis in cervical region     Tobacco use disorder     Upper GI bleed      Past Surgical History:   Procedure Laterality Date    BACK SURGERY      C4-C5 surgery    COLONOSCOPY      ESOPHAGOSCOPY, GASTROSCOPY, DUODENOSCOPY (EGD), COMBINED N/A 2023    Procedure: ESOPHAGOGASTRODUODENOSCOPY with EPINEPHRINE INJECTION and CAUTERY and BIOPSY;  Surgeon: Eliazar Leyva MD;  Location: [facility] Main OR    HERNIA REPAIR Left     inguinal hernia repair    REPLACE DISK CERVICAL ANTERIOR N/A 10/10/2023    Procedure: Cervical 5 to cervical 6 arthroplasty;  Surgeon: Jered Salvador MD;  Location:  OR     Social History     Socioeconomic History    Marital status:      Spouse name: Not on file    Number of children: Not on file    Years of education: Not on file    Highest education level: Not on file   Occupational History    Not on file   Tobacco Use    Smoking status: Former     Packs/day: 1.00     Years: 10.00     Additional pack years: 0.00     Total pack years: 10.00     Types: Cigarettes     Quit date: 10/2/2023     Years since quittin.0    Smokeless tobacco: Never   Vaping Use    Vaping Use: Never used   Substance and Sexual Activity    Alcohol use: Not Currently    Drug use: Never    Sexual activity: Not on file   Other Topics  Concern    Not on file   Social History Narrative    Not on file     Social Determinants of Health     Financial Resource Strain: Not on file   Food Insecurity: Not on file   Transportation Needs: Not on file   Physical Activity: Not on file   Stress: Not on file   Social Connections: Not on file   Interpersonal Safety: Not on file   Housing Stability: Not on file     No family history on file.     ROS: 10 point ROS neg other than the symptoms noted above in the HPI.    Physical Exam  BP (!) 138/94   Pulse 90   SpO2 95%   HEENT:  Normocephalic, atraumatic.  PERRLA.  EOM s intact.  Visual fields full to gross exam  Neck:  Supple, non-tender, without lymphadenopathy.  Heart:  No peripheral edema  Lungs:  No SOB  Abdomen:  Non-distended.   Skin:  Warm and dry.  Extremities:  No edema, cyanosis or clubbing.  Psychiatric:  No apparent distress  Musculoskeletal:  Normal bulk and tone    NEUROLOGICAL EXAMINATION:     Mental status:  Alert and Oriented x 3, speech is fluent.  Cranial nerves:  II-XII intact.   Motor:    Shoulder Abduction:  Right:  5/5   Left:  5/5  Biceps:                      Right:  5/5   Left:  5/5  Triceps:                     Right:  5/5   Left:  5/5  Wrist Extensors:       Right:  5/5   Left:  5/5  Wrist Flexors:           Right:  5/5   Left:  5/5  interosseus :            Right:  5/5   Left:  5/5  Hip Flexor:                Right: 5/5  Left:  5/5  Quadriceps:             Right:  5/5  Left:  5/5  Hamstrings:             Right:  5/5  Left:  5/5  Gastroc Soleus:        Right:  5/5  Left:  5/5  Tib/Ant:                      Right:  5/5  Left:  5/5  EHL:                     Right:  5/5  Left:  5/5  Sensation:  Intact  Reflexes:  Negative Babinski.  Negative Clonus.  Negative Cobian's.  Coordination:  Smooth finger to nose testing.   Negative pronator drift.  Smooth tandem walking.  Incision well healed    A/P:  XRs stable  Continue pain management and routine follow up

## 2023-11-03 ENCOUNTER — ANCILLARY PROCEDURE (OUTPATIENT)
Dept: GENERAL RADIOLOGY | Facility: CLINIC | Age: 53
End: 2023-11-03
Attending: NEUROLOGICAL SURGERY
Payer: COMMERCIAL

## 2023-11-03 DIAGNOSIS — Z98.890 S/P CERVICAL DISC REPLACEMENT: ICD-10-CM

## 2023-11-03 PROCEDURE — 71046 X-RAY EXAM CHEST 2 VIEWS: CPT | Mod: TC | Performed by: RADIOLOGY

## 2023-11-03 NOTE — TELEPHONE ENCOUNTER
TERRIE for return call. Will also send a message via [employer].    Joycelyn Pacheco RN on 11/3/2023 at 9:59 AM

## 2023-11-08 NOTE — TELEPHONE ENCOUNTER
Chart review: I see that patient has not read his [employer] message which provides the chest x-ray results. Therefore, LM instructing patient to review next steps through [employer].

## 2023-11-21 ENCOUNTER — OFFICE VISIT (OUTPATIENT)
Dept: NEUROSURGERY | Facility: CLINIC | Age: 53
End: 2023-11-21
Payer: COMMERCIAL

## 2023-11-21 ENCOUNTER — ANCILLARY PROCEDURE (OUTPATIENT)
Dept: GENERAL RADIOLOGY | Facility: CLINIC | Age: 53
End: 2023-11-21
Attending: NEUROLOGICAL SURGERY
Payer: COMMERCIAL

## 2023-11-21 VITALS
BODY MASS INDEX: 24.84 KG/M2 | HEART RATE: 108 BPM | HEIGHT: 72 IN | DIASTOLIC BLOOD PRESSURE: 94 MMHG | WEIGHT: 183.4 LBS | SYSTOLIC BLOOD PRESSURE: 141 MMHG

## 2023-11-21 DIAGNOSIS — Z98.890 S/P CERVICAL DISC REPLACEMENT: Primary | ICD-10-CM

## 2023-11-21 DIAGNOSIS — Z98.890 S/P CERVICAL DISC REPLACEMENT: ICD-10-CM

## 2023-11-21 PROCEDURE — 99024 POSTOP FOLLOW-UP VISIT: CPT | Performed by: NURSE PRACTITIONER

## 2023-11-21 PROCEDURE — 72040 X-RAY EXAM NECK SPINE 2-3 VW: CPT | Mod: TC | Performed by: RADIOLOGY

## 2023-11-21 ASSESSMENT — PAIN SCALES - GENERAL: PAINLEVEL: NO PAIN (0)

## 2023-11-21 NOTE — PATIENT INSTRUCTIONS
-May gradually increase activities as tolerated.  -Physical therapy ordered. They will contact you to schedule.  -Follow up in 6 weeks with cervical xray prior.  -Please contact our clinic with questions or concerns at [phone].

## 2023-11-21 NOTE — NURSING NOTE
Enmanuel Randall is a 53 year old male who presents for:  Chief Complaint   Patient presents with    Neurologic Problem     S/p  C5-6 arthroplasty. DOS 10/10/23. Xray today. Patient notes his neck is doing a lot better than last time. No issues or concerns. His stamina is down.         Vitals:    Vitals:    11/21/23 1341   BP: (!) 141/94   Pulse: 108   Weight: 183 lb 6.4 oz (83.2 kg)   Height: 6' (1.829 m)       BMI:  Estimated body mass index is 24.87 kg/m  as calculated from the following:    Height as of this encounter: 6' (1.829 m).    Weight as of this encounter: 183 lb 6.4 oz (83.2 kg).    Pain Score:  No Pain (0)        Cady Barton, CMA

## 2023-11-21 NOTE — PROGRESS NOTES
[facility] Neurosurgery  Neurosurgery Followup:    HPI: 6 weeks s/p C5-6 arthroplasty with Dr. Salvador 10/10/2023. Had increase in neck pain a few weeks ago and presented to the ED with follow up in clinic with Dr. Salvador. Today he reports significant improvement in postoperative symptoms. He reports minimal neck pain and no radicular arm pain. He reports improvement in left arm strength, however continues to have some weakness of the left arm/shoulder. No new or worsening symptoms. Incision CDI.    Medical, surgical, family, and social history unchanged since prior exam.    Exam:  Constitutional:  Alert, well nourished, NAD.  HEENT: Normocephalic, atraumatic.   Pulm:  Without shortness of breath   CV:  No pitting edema of BLE.     Vital Signs:  BP (!) 141/94   Pulse 108   Ht 6' (1.829 m)   Wt 183 lb 6.4 oz (83.2 kg)   BMI 24.87 kg/m      Neurological:  Awake  Alert  Oriented x 3  Motor exam:     Shoulder Abduction:  Right:  5/5    Left:  5/5  Biceps:                      Right:  5/5    Left:  5/5  Triceps:                     Right:  5/5    Left:  5/5  Wrist Extensors:       Right:  5/5    Left:  5/5  Wrist Flexors:           Right:  5/5    Left:  5/5  Intrinsics:                  Right:  5/5    Left:  5/5     Able to spontaneously move U/E bilaterally  Sensation intact throughout all U/E dermatomes    Incisions:  Healing nicely    Imaging:  AP and lateral films reveal intact and stable hardware.     A/P: s/p cervical disc replacement  May gradually increase activities as tolerated. Follow up in clinic in 6 weeks with cervical xray prior. Would like to begin PT to work on left arm strength. Orders placed. He verbalized understanding and agreement.    Patient Instructions   -May gradually increase activities as tolerated.  -Physical therapy ordered. They will contact you to schedule.  -Follow up in 6 weeks with cervical xray prior.  -Please contact our clinic with questions or concerns at  [phone].    Mary Washington, [address] [place] [address]  Tel [phone]  Fax [phone]

## 2023-11-21 NOTE — LETTER
11/21/2023         RE: Enmanuel Randall  [address] [place] [address]        Dear Colleague,    Thank you for referring your patient, Enmanuel Randall, to the [facility]. Please see a copy of my visit note below.    [facility] Neurosurgery  Neurosurgery Followup:    HPI: 6 weeks s/p C5-6 arthroplasty with Dr. Salvador 10/10/2023. Had increase in neck pain a few weeks ago and presented to the ED with follow up in clinic with Dr. Salvador. Today he reports significant improvement in postoperative symptoms. He reports minimal neck pain and no radicular arm pain. He reports improvement in left arm strength, however continues to have some weakness of the left arm/shoulder. No new or worsening symptoms. Incision CDI.    Medical, surgical, family, and social history unchanged since prior exam.    Exam:  Constitutional:  Alert, well nourished, NAD.  HEENT: Normocephalic, atraumatic.   Pulm:  Without shortness of breath   CV:  No pitting edema of BLE.     Vital Signs:  BP (!) 141/94   Pulse 108   Ht 6' (1.829 m)   Wt 183 lb 6.4 oz (83.2 kg)   BMI 24.87 kg/m      Neurological:  Awake  Alert  Oriented x 3  Motor exam:     Shoulder Abduction:  Right:  5/5    Left:  5/5  Biceps:                      Right:  5/5    Left:  5/5  Triceps:                     Right:  5/5    Left:  5/5  Wrist Extensors:       Right:  5/5    Left:  5/5  Wrist Flexors:           Right:  5/5    Left:  5/5  Intrinsics:                  Right:  5/5    Left:  5/5     Able to spontaneously move U/E bilaterally  Sensation intact throughout all U/E dermatomes    Incisions:  Healing nicely    Imaging:  AP and lateral films reveal intact and stable hardware.     A/P: s/p cervical disc replacement  May gradually increase activities as tolerated. Follow up in clinic in 6 weeks with cervical xray prior. Would like to begin PT to work on left arm strength. Orders placed. He verbalized understanding and agreement.    Patient  Instructions   -May gradually increase activities as tolerated.  -Physical therapy ordered. They will contact you to schedule.  -Follow up in 6 weeks with cervical xray prior.  -Please contact our clinic with questions or concerns at [phone].    Mary Washington CNP  [facility] Neurosurgery  [address] [place] [id number]  Tel [phone]  Fax [phone]      Again, thank you for allowing me to participate in the care of your patient.        Sincerely,        Mary Washington, NP

## 2023-12-31 ENCOUNTER — HEALTH MAINTENANCE LETTER (OUTPATIENT)
Age: 53
End: 2023-12-31

## 2024-01-12 ENCOUNTER — THERAPY VISIT (OUTPATIENT)
Dept: PHYSICAL THERAPY | Facility: REHABILITATION | Age: 54
End: 2024-01-12
Attending: NURSE PRACTITIONER
Payer: COMMERCIAL

## 2024-01-12 DIAGNOSIS — Z98.890 S/P CERVICAL DISC REPLACEMENT: Primary | ICD-10-CM

## 2024-01-12 DIAGNOSIS — R29.898 LEFT ARM WEAKNESS: ICD-10-CM

## 2024-01-12 PROCEDURE — 97161 PT EVAL LOW COMPLEX 20 MIN: CPT | Mod: GP

## 2024-01-12 PROCEDURE — 97110 THERAPEUTIC EXERCISES: CPT | Mod: GP

## 2024-01-12 NOTE — PROGRESS NOTES
PHYSICAL THERAPY EVALUATION  Type of Visit: Evaluation    See electronic medical record for Abuse and Falls Screening details.    Subjective       Presenting condition or subjective complaint:  cervical C5-6 surgery Oct 2023  Date of onset: 10/10/23 (DOS)    Relevant medical history:   smoking  Dates & types of surgery:  s/p C5-6 arthroplasty with Dr. Salvador 10/10/2023    Prior diagnostic imaging/testing results:     MRI, CT scan, x-ray  Prior therapy history for the same diagnosis, illness or injury:    no    Prior Level of Function - Independent    Living Environment  Social support:     Type of home:   1 level with basement  Stairs to enter the home:       2  Ramp:     Stairs inside the home:       12 w/ railing  Help at home:    Equipment owned:       Employment:      Hobbies/Interests:  biking, traveling    Patient goals for therapy:  strengthen L arm    Pain assessment: Pain present   Pt reports today s/p C5-6 arthroplasty with Dr. Salvador 10/10/23 with chief complaint of L arm weakness. Pt reports that he has trouble even when he tries to  a gallon of milk. Denies numbness, tingling. Reports that reaching overhead is a little difficult, but mostly its the strength aspect that's bothering him. Pt will benefit from skilled PT intervention including strengthening, manual therapy and stretching for tight cervical musculature, and education.       Objective   CERVICAL SPINE EVALUATION  PAIN: Pain Level at Rest: 1/10  INTEGUMENTARY (edema, incisions): WNL  POSTURE: WNL  ROM:   (Degrees) Left AROM Right AROM    Cervical Flexion WNL    Cervical Extension 50%    Cervical Side bend 75% 75%    Cervical Rotation 62* 75*    Cervical Protrusion     Cervical Retraction     Thoracic Flexion WNL    Thoracic Extension WNL    Thoracic Rotation WNL      Left AROM Left PROM Right AROM Right PROM   Shoulder Flexion 145* 180* 160* 180*   Shoulder Extension       Shoulder Abduction WNL  WNL    Shoulder Adduction  WNL  WNL    Shoulder IR T11  T3    Shoulder ER C2  C4    Shoulder Horiz Abduction       Shoulder Horiz Adduction       Pain:   End Feel: tightness  MYOTOMES: WNL  DTR S: WNL  CORD SIGNS: WNL  DERMATOMES: WNL  NEURAL TENSION: median, radial and ulnar all neg. bilaterally  PALPATION:   + Tenderness At Location Left Right   Sternocleidomastoid + +   Scalenes + +   Rhomboids     Facet     Upper Trap + +   Levator + +   Erector Spinae + +   Suboccipitals + +     SPINAL SEGMENTAL CONCLUSIONS: WNL    Assessment & Plan   CLINICAL IMPRESSIONS  Medical Diagnosis: s/p cervical disc replacement    Treatment Diagnosis: s/p cervical disc replacement; L arm weakness   Impression/Assessment: Patient is a 53 year old male s/p cervical disc replacement with L arm weakness complaints.  The following significant findings have been identified: Pain, Decreased ROM/flexibility, Decreased joint mobility, Decreased strength, Impaired balance, Decreased proprioception, Impaired muscle performance, and Decreased activity tolerance. These impairments interfere with their ability to perform self care tasks, household chores, household mobility, and community mobility as compared to previous level of function.     Clinical Decision Making (Complexity):  Clinical Presentation: Stable/Uncomplicated  Clinical Presentation Rationale: based on medical and personal factors listed in PT evaluation  Clinical Decision Making (Complexity): Low complexity    PLAN OF CARE  Treatment Interventions:  Interventions: Manual Therapy, Neuromuscular Re-education, Therapeutic Activity, Therapeutic Exercise, Self-Care/Home Management    Long Term Goals     PT Goal 1  Goal Identifier: HEP  Goal Description: Pt will be independent in HEP in order to self manage symptoms  Rationale: to maximize safety and independence with performance of ADLs and functional tasks  Target Date: 02/02/24  PT Goal 2  Goal Identifier: Strength  Goal Description: Pt's arm strength will  improve to >4/5 in all motions bilateral in order to adequately and safely lift objects  Rationale: to maximize safety and independence with performance of ADLs and functional tasks;to maximize safety and independence within the home;to maximize safety and independence within the community  Target Date: 03/15/24  PT Goal 3  Goal Identifier: ROM  Goal Description: Pt's arm flexion ROM will improve to 170* bilaterally in order to perform self cares  Rationale: to maximize safety and independence with self cares  Target Date: 03/01/24      Frequency of Treatment: 1x/week  Duration of Treatment: 9 weeks    Education Assessment:   Learner/Method: Patient    Risks and benefits of evaluation/treatment have been explained.   Patient/Family/caregiver agrees with Plan of Care.     Evaluation Time:     PT Eval, Low Complexity Minutes (32897): 10     Signing Clinician: Davida Durham PT

## 2024-01-19 ENCOUNTER — HOSPITAL ENCOUNTER (OUTPATIENT)
Dept: RADIOLOGY | Facility: CLINIC | Age: 54
Discharge: HOME | End: 2024-01-19
Attending: NEUROLOGICAL SURGERY | Admitting: NEUROLOGICAL SURGERY
Payer: COMMERCIAL

## 2024-01-19 DIAGNOSIS — Z98.890 S/P CERVICAL DISC REPLACEMENT: ICD-10-CM

## 2024-01-19 PROCEDURE — 72040 X-RAY EXAM NECK SPINE 2-3 VW: CPT

## 2024-01-26 ENCOUNTER — THERAPY VISIT (OUTPATIENT)
Dept: PHYSICAL THERAPY | Facility: REHABILITATION | Age: 54
End: 2024-01-26
Attending: NURSE PRACTITIONER
Payer: COMMERCIAL

## 2024-01-26 DIAGNOSIS — Z98.890 S/P CERVICAL DISC REPLACEMENT: Primary | ICD-10-CM

## 2024-01-26 DIAGNOSIS — R29.898 LEFT ARM WEAKNESS: ICD-10-CM

## 2024-01-26 PROCEDURE — 97110 THERAPEUTIC EXERCISES: CPT | Mod: GP

## 2024-01-31 NOTE — TELEPHONE ENCOUNTER
Gladys Cummings CNP reviewed chart and Per pre op office notes, patient had left arm pain and weakness prior to surgery.   We can order MDP and xray.   Follow-up this week as scheduled .    Called and reviewed above recs with patient. He verbalized understanding. Order placed for Cervical XR.MDP sent to pharmacy. Patient requested MyC with recs and scheduling number for XR. MyC message sent with that info.    Health Maintenance Due   Topic Date Due    Pneumococcal Vaccine 0-64 (1 of 2 - PCV) Never done    Influenza Vaccine (1) Never done    COVID-19 Vaccine (4 - 2023-24 season) 09/01/2023       Patient is due for topics as listed above but is not proceeding with Immunization(s) COVID-19, Influenza, and Pneumococcal at this time. Education provided for Immunization(s) COVID-19, Influenza, and Pneumococcal.

## 2024-02-02 ENCOUNTER — THERAPY VISIT (OUTPATIENT)
Dept: PHYSICAL THERAPY | Facility: REHABILITATION | Age: 54
End: 2024-02-02
Payer: COMMERCIAL

## 2024-02-02 DIAGNOSIS — Z98.890 S/P CERVICAL DISC REPLACEMENT: Primary | ICD-10-CM

## 2024-02-02 DIAGNOSIS — R29.898 LEFT ARM WEAKNESS: ICD-10-CM

## 2024-02-02 PROCEDURE — 97110 THERAPEUTIC EXERCISES: CPT | Mod: GP

## 2024-02-09 ENCOUNTER — THERAPY VISIT (OUTPATIENT)
Dept: PHYSICAL THERAPY | Facility: REHABILITATION | Age: 54
End: 2024-02-09
Payer: COMMERCIAL

## 2024-02-09 DIAGNOSIS — Z98.890 S/P CERVICAL DISC REPLACEMENT: Primary | ICD-10-CM

## 2024-02-09 DIAGNOSIS — R29.898 LEFT ARM WEAKNESS: ICD-10-CM

## 2024-02-09 DIAGNOSIS — M62.81 GENERALIZED MUSCLE WEAKNESS: ICD-10-CM

## 2024-02-09 PROCEDURE — 97110 THERAPEUTIC EXERCISES: CPT | Mod: GP | Performed by: PHYSICAL THERAPY ASSISTANT

## 2024-02-09 PROCEDURE — 97140 MANUAL THERAPY 1/> REGIONS: CPT | Mod: GP | Performed by: PHYSICAL THERAPY ASSISTANT

## 2024-02-16 ENCOUNTER — THERAPY VISIT (OUTPATIENT)
Dept: PHYSICAL THERAPY | Facility: REHABILITATION | Age: 54
End: 2024-02-16
Payer: COMMERCIAL

## 2024-02-16 DIAGNOSIS — Z98.890 S/P CERVICAL DISC REPLACEMENT: Primary | ICD-10-CM

## 2024-02-16 DIAGNOSIS — M62.81 GENERALIZED MUSCLE WEAKNESS: ICD-10-CM

## 2024-02-16 DIAGNOSIS — R29.898 LEFT ARM WEAKNESS: ICD-10-CM

## 2024-02-16 PROCEDURE — 97110 THERAPEUTIC EXERCISES: CPT | Mod: GP | Performed by: PHYSICAL THERAPY ASSISTANT

## 2024-04-23 ENCOUNTER — TRANSFERRED RECORDS (OUTPATIENT)
Dept: HEALTH INFORMATION MANAGEMENT | Facility: CLINIC | Age: 54
End: 2024-04-23

## 2024-07-28 ENCOUNTER — HEALTH MAINTENANCE LETTER (OUTPATIENT)
Age: 54
End: 2024-07-28

## 2024-08-15 ENCOUNTER — HOSPITAL ENCOUNTER (OUTPATIENT)
Dept: RADIOLOGY | Facility: CLINIC | Age: 54
Discharge: HOME | End: 2024-08-15
Attending: STUDENT IN AN ORGANIZED HEALTH CARE EDUCATION/TRAINING PROGRAM | Admitting: STUDENT IN AN ORGANIZED HEALTH CARE EDUCATION/TRAINING PROGRAM
Payer: COMMERCIAL

## 2024-08-15 DIAGNOSIS — Z12.11 COLON CANCER SCREENING: ICD-10-CM

## 2024-08-15 DIAGNOSIS — R14.0 ABDOMINAL DISTENTION: ICD-10-CM

## 2024-08-15 DIAGNOSIS — Z87.19 HISTORY OF DUODENAL ULCER: ICD-10-CM

## 2024-08-15 PROCEDURE — 74240 X-RAY XM UPR GI TRC 1CNTRST: CPT

## 2024-08-15 PROCEDURE — 250N000013 HC RX MED GY IP 250 OP 250 PS 637: Performed by: INTERNAL MEDICINE

## 2024-08-15 RX ADMIN — ANTACID/ANTIFLATULENT 4 G: 380; 550; 10; 10 GRANULE, EFFERVESCENT ORAL at 08:02

## 2024-10-06 ENCOUNTER — HOSPITAL ENCOUNTER (OUTPATIENT)
Dept: CT IMAGING | Facility: CLINIC | Age: 54
Discharge: HOME | End: 2024-10-06
Attending: NURSE PRACTITIONER | Admitting: NURSE PRACTITIONER
Payer: COMMERCIAL

## 2024-10-06 DIAGNOSIS — R14.0 ABDOMINAL BLOATING: ICD-10-CM

## 2024-10-06 DIAGNOSIS — R14.2 BELCHING: ICD-10-CM

## 2024-10-06 DIAGNOSIS — J98.6 DIAPHRAGM PARALYSIS: ICD-10-CM

## 2024-10-06 PROCEDURE — 250N000011 HC RX IP 250 OP 636: Performed by: NURSE PRACTITIONER

## 2024-10-06 PROCEDURE — 74177 CT ABD & PELVIS W/CONTRAST: CPT

## 2024-10-06 RX ORDER — IOPAMIDOL 755 MG/ML
100 INJECTION, SOLUTION INTRAVASCULAR ONCE
Status: COMPLETED | OUTPATIENT
Start: 2024-10-06 | End: 2024-10-06

## 2024-10-06 RX ADMIN — IOPAMIDOL 100 ML: 755 INJECTION, SOLUTION INTRAVENOUS at 13:45

## 2024-10-09 ENCOUNTER — MEDICAL CORRESPONDENCE (OUTPATIENT)
Dept: HEALTH INFORMATION MANAGEMENT | Facility: CLINIC | Age: 54
End: 2024-10-09
Payer: COMMERCIAL

## 2024-10-10 ENCOUNTER — TRANSCRIBE ORDERS (OUTPATIENT)
Dept: OTHER | Age: 54
End: 2024-10-10

## 2024-10-10 DIAGNOSIS — J98.6 DIAPHRAGM PARALYSIS: Primary | ICD-10-CM

## 2024-11-04 ENCOUNTER — OFFICE VISIT (OUTPATIENT)
Dept: SURGERY | Facility: CLINIC | Age: 54
End: 2024-11-04
Attending: NURSE PRACTITIONER
Payer: COMMERCIAL

## 2024-11-04 VITALS
BODY MASS INDEX: 25.29 KG/M2 | HEIGHT: 72 IN | SYSTOLIC BLOOD PRESSURE: 134 MMHG | DIASTOLIC BLOOD PRESSURE: 88 MMHG | WEIGHT: 186.7 LBS

## 2024-11-04 DIAGNOSIS — J98.6 DIAPHRAGM PARALYSIS: Primary | ICD-10-CM

## 2024-11-04 PROCEDURE — 99204 OFFICE O/P NEW MOD 45 MIN: CPT | Performed by: SURGERY

## 2024-11-04 RX ORDER — BUPROPION HYDROCHLORIDE 150 MG/1
150 TABLET ORAL EVERY MORNING
COMMUNITY
Start: 2024-10-29

## 2024-11-04 NOTE — PROGRESS NOTES
HPI: Enmanuel Randall is a 54 year old male referred to see me by Ignacio Miller for evaluation of hemiparesis of the left diaphragm.  He notes a history of bloating and sometimes an inability to belch which forces him to lay on his right side in order to prevent gas.  He denies any abdominal discomfort.  He states all the symptoms started after his cervical surgery in October of last year.  Otherwise, he has no other complaints at present.    Allergies:Bupropion, Fentanyl, and Penicillins    Past Medical History:   Diagnosis Date    Cervical radiculopathy     Duodenal ulcer 09/22/2023    Dyslipidemia     ED (erectile dysfunction)     Seborrheic dermatitis     Spinal stenosis in cervical region     Tobacco use disorder     Upper GI bleed        Past Surgical History:   Procedure Laterality Date    BACK SURGERY      C4-C5 surgery    COLONOSCOPY      ESOPHAGOSCOPY, GASTROSCOPY, DUODENOSCOPY (EGD), COMBINED N/A 09/03/2023    Procedure: ESOPHAGOGASTRODUODENOSCOPY with EPINEPHRINE INJECTION and CAUTERY and BIOPSY;  Surgeon: Eliazar Leyva MD;  Location: [facility] Main OR    HERNIA REPAIR Left     inguinal hernia repair    REPLACE DISK CERVICAL ANTERIOR N/A 10/10/2023    Procedure: Cervical 5 to cervical 6 arthroplasty;  Surgeon: Jered Salvador MD;  Location:  OR       CURRENT MEDS:    Current Outpatient Medications:     buPROPion (WELLBUTRIN XL) 150 MG 24 hr tablet, Take 150 mg by mouth every morning., Disp: , Rfl:     cyclobenzaprine (FLEXERIL) 10 MG tablet, Take 1 tablet (10 mg) by mouth 3 times daily as needed for muscle spasms, Disp: 40 tablet, Rfl: 0    pantoprazole (PROTONIX) 40 MG EC tablet, Take 1 tablet (40 mg) by mouth 2 times daily, Disp: 60 tablet, Rfl: 0    rosuvastatin (CRESTOR) 20 MG tablet, Take 20 mg by mouth daily, Disp: , Rfl:     sildenafil (VIAGRA) 100 MG tablet, Take 100 mg by mouth daily as needed., Disp: , Rfl:     Turmeric (QC TUMERIC COMPLEX PO), Take 2 tablets by mouth  daily, Disp: , Rfl:     Vitamin D3 (VITAMIN D, CHOLECALCIFEROL,) 25 mcg (1000 units) tablet, Take 25 mcg by mouth daily, Disp: , Rfl:     loratadine (CLARITIN) 10 MG tablet, Take 10 mg by mouth daily (Patient not taking: Reported on 11/4/2024), Disp: , Rfl:     No family history on file.     reports that he quit smoking about 13 months ago. His smoking use included cigarettes. He started smoking about 11 years ago. He has a 10 pack-year smoking history. He has never used smokeless tobacco. He reports that he does not currently use alcohol. He reports that he does not use drugs.    Review of Systems:  The 12 system review is within normal limits except for as mentioned above.  General ROS: No complaints or constitutional symptoms  Ophthalmic ROS: No complaints of visual changes  Skin: No complaints or symptoms   Endocrine: No complaints or symptoms  Hematologic/Lymphatic: No symptoms or complaints  Psychiatric: No symptoms or complaints  Respiratory ROS: no cough, shortness of breath, or wheezing  Cardiovascular ROS: no chest pain or dyspnea on exertion  Gastrointestinal ROS: As per HPI  Genito-Urinary ROS: no dysuria, trouble voiding, or hematuria  Musculoskeletal ROS: no joint or muscle pain  Neurological ROS: no TIA or stroke symptoms      EXAM:  /88   Ht 1.829 m (6')   Wt 84.7 kg (186 lb 11.2 oz)   BMI 25.32 kg/m    GENERAL: Well developed male, No acute distress, pleasant and conversant   EYES: Pupils equal, round and reactive, no scleral icterus  ABDOMEN: Soft  SKIN: Pink, warm and dry, no obvious rashes or lesions   NEURO:No focal deficits, ambulatory  MUSCULOSKELETAL:No obvious deformities, no swelling, normal appearing      LABS:  Lab Results   Component Value Date    WBC 8.7 10/28/2023    HGB 13.6 10/28/2023    HCT 42.3 10/28/2023    MCV 82 10/28/2023     10/28/2023     INR/Prothrombin Time  @LABRCNTIP(NA,K,CL,co2,bun,creatinine,labglom,glucose,calcium)@  Lab Results   Component Value Date     ALT 18 09/02/2023    AST 19 09/02/2023    ALKPHOS 58 09/02/2023       IMAGES:   Relevant images were reviewed and discussed with the patient.  Notable findings were: CT images reviewed    Assessment/Plan:   Enmanuel Randall is a 54 year old male with signs and symptoms consistent with paralysis of the left hemidiaphragm.  This is likely secondary to nerve injury from his cervical procedure.  We did discuss surgical options including diaphragmatic plication which can be done transthoracically.  I do not do this procedure, however, we can refer him to the thoracic surgeons at the [place] for evaluation and possible treatment.  If they are unable to perform this then we can discuss other options including intra-abdominal plication.  He understands having that was discussed and will await referral to the thoracic team at the [place].    Fab Bonilla DO [facility]  [phone]  [facility] Department of Surgery

## 2024-11-04 NOTE — LETTER
11/4/2024      Enmanuel Randall  [address] [place] [address]      Dear Colleague,    Thank you for referring your patient, Enmanuel Randall, to the [facility] [place]. Please see a copy of my visit note below.        HPI: Enmanuel Randall is a 54 year old male referred to see me by Ignacio Miller for evaluation of hemiparesis of the left diaphragm.  He notes a history of bloating and sometimes an inability to belch which forces him to lay on his right side in order to prevent gas.  He denies any abdominal discomfort.  He states all the symptoms started after his cervical surgery in October of last year.  Otherwise, he has no other complaints at present.    Allergies:Bupropion, Fentanyl, and Penicillins    Past Medical History:   Diagnosis Date     Cervical radiculopathy      Duodenal ulcer 09/22/2023     Dyslipidemia      ED (erectile dysfunction)      Seborrheic dermatitis      Spinal stenosis in cervical region      Tobacco use disorder      Upper GI bleed        Past Surgical History:   Procedure Laterality Date     BACK SURGERY      C4-C5 surgery     COLONOSCOPY       ESOPHAGOSCOPY, GASTROSCOPY, DUODENOSCOPY (EGD), COMBINED N/A 09/03/2023    Procedure: ESOPHAGOGASTRODUODENOSCOPY with EPINEPHRINE INJECTION and CAUTERY and BIOPSY;  Surgeon: Eliazar Leyva MD;  Location: [facility] Main OR     HERNIA REPAIR Left     inguinal hernia repair     REPLACE DISK CERVICAL ANTERIOR N/A 10/10/2023    Procedure: Cervical 5 to cervical 6 arthroplasty;  Surgeon: Jered Salvador MD;  Location:  OR       CURRENT MEDS:    Current Outpatient Medications:      buPROPion (WELLBUTRIN XL) 150 MG 24 hr tablet, Take 150 mg by mouth every morning., Disp: , Rfl:      cyclobenzaprine (FLEXERIL) 10 MG tablet, Take 1 tablet (10 mg) by mouth 3 times daily as needed for muscle spasms, Disp: 40 tablet, Rfl: 0     pantoprazole (PROTONIX) 40 MG EC tablet, Take 1 tablet (40 mg) by mouth 2  times daily, Disp: 60 tablet, Rfl: 0     rosuvastatin (CRESTOR) 20 MG tablet, Take 20 mg by mouth daily, Disp: , Rfl:      sildenafil (VIAGRA) 100 MG tablet, Take 100 mg by mouth daily as needed., Disp: , Rfl:      Turmeric (QC TUMERIC COMPLEX PO), Take 2 tablets by mouth daily, Disp: , Rfl:      Vitamin D3 (VITAMIN D, CHOLECALCIFEROL,) 25 mcg (1000 units) tablet, Take 25 mcg by mouth daily, Disp: , Rfl:      loratadine (CLARITIN) 10 MG tablet, Take 10 mg by mouth daily (Patient not taking: Reported on 11/4/2024), Disp: , Rfl:     No family history on file.     reports that he quit smoking about 13 months ago. His smoking use included cigarettes. He started smoking about 11 years ago. He has a 10 pack-year smoking history. He has never used smokeless tobacco. He reports that he does not currently use alcohol. He reports that he does not use drugs.    Review of Systems:  The 12 system review is within normal limits except for as mentioned above.  General ROS: No complaints or constitutional symptoms  Ophthalmic ROS: No complaints of visual changes  Skin: No complaints or symptoms   Endocrine: No complaints or symptoms  Hematologic/Lymphatic: No symptoms or complaints  Psychiatric: No symptoms or complaints  Respiratory ROS: no cough, shortness of breath, or wheezing  Cardiovascular ROS: no chest pain or dyspnea on exertion  Gastrointestinal ROS: As per HPI  Genito-Urinary ROS: no dysuria, trouble voiding, or hematuria  Musculoskeletal ROS: no joint or muscle pain  Neurological ROS: no TIA or stroke symptoms      EXAM:  /88   Ht 1.829 m (6')   Wt 84.7 kg (186 lb 11.2 oz)   BMI 25.32 kg/m    GENERAL: Well developed male, No acute distress, pleasant and conversant   EYES: Pupils equal, round and reactive, no scleral icterus  ABDOMEN: Soft  SKIN: Pink, warm and dry, no obvious rashes or lesions   NEURO:No focal deficits, ambulatory  MUSCULOSKELETAL:No obvious deformities, no swelling, normal  appearing      LABS:  Lab Results   Component Value Date    WBC 8.7 10/28/2023    HGB 13.6 10/28/2023    HCT 42.3 10/28/2023    MCV 82 10/28/2023     10/28/2023     INR/Prothrombin Time  @LABRCNTIP(NA,K,CL,co2,bun,creatinine,labglom,glucose,calcium)@  Lab Results   Component Value Date    ALT 18 09/02/2023    AST 19 09/02/2023    ALKPHOS 58 09/02/2023       IMAGES:   Relevant images were reviewed and discussed with the patient.  Notable findings were: CT images reviewed    Assessment/Plan:   Enmanuel Randall is a 54 year old male with signs and symptoms consistent with paralysis of the left hemidiaphragm.  This is likely secondary to nerve injury from his cervical procedure.  We did discuss surgical options including diaphragmatic plication which can be done transthoracically.  I do not do this procedure, however, we can refer him to the thoracic surgeons at the [place] for evaluation and possible treatment.  If they are unable to perform this then we can discuss other options including intra-abdominal plication.  He understands having that was discussed and will await referral to the thoracic team at the [place].    Fab Bnoilla DO [facility]  [phone]  [facility] Department of Surgery      Again, thank you for allowing me to participate in the care of your patient.        Sincerely,        Fab Bonilla DO

## 2024-11-11 ENCOUNTER — PATIENT OUTREACH (OUTPATIENT)
Dept: ONCOLOGY | Facility: CLINIC | Age: 54
End: 2024-11-11
Payer: COMMERCIAL

## 2024-11-11 DIAGNOSIS — J98.6 DIAPHRAGM PARALYSIS: Primary | ICD-10-CM

## 2024-11-11 NOTE — PROGRESS NOTES
New Patient Oncology Nurse Navigator Note     Referring provider: Dr. Fab Bonilla    Referring Clinic/Organization: [facility]  Referred to: Thoracic Surgery  Requested provider (if applicable): First available - did not specify   Referral Received: 11/11/24       Evaluation for :   Diagnosis   J98.6 (ICD-10-CM) - Diaphragm paralysis      Additional Information:  needs diaphragm plication    Clinical History (per Nurse review of records provided):      04/23/2024 CT Chest Lung (Care Everywhere) showed:   CONCLUSION:      1. New right lung subcentimeter nodule measures 3 mm.   2. New elevation of the left hemidiaphragm since 2022 exam.   Findings are of unknown clinical significance or etiology.   Consider CT imaging of the head/neck to assess for potential   causes, as indicated.       Clinical Assessment / Barriers to Care (Per Nurse):  Social History  Tobacco Use Types Packs/Day Years Used Date   Smoking Tobacco: Former Cigarettes 1 10      Records Location: [facility]   Care Everywhere   Records Needed: Outside CT images from [facility]  Additional testing needed prior to consult: Sniff test, sitting/supine PFTs    AUREA Saxena, RN, OCN  [facility] Oncology Nurse Navigator  [phone] / [phone]

## 2024-11-12 ENCOUNTER — PRE VISIT (OUTPATIENT)
Dept: ONCOLOGY | Facility: CLINIC | Age: 54
End: 2024-11-12
Payer: COMMERCIAL

## 2024-11-12 NOTE — TELEPHONE ENCOUNTER
RECORDS STATUS - ALL OTHER DIAGNOSIS      RECORDS RECEIVED FROM: [facility]    Appt Date: TBD NN WQ    Action    Action Taken 11/12/2024 10:11AM KEB   I called [facility]'s IMG Dept [phone] -the scan was done with [facility]     I called [facility] [phone] #4- they will push the scan to  PACS      NOTES STATUS DETAILS   OFFICE NOTE from referring provider     OFFICE NOTE from medical oncologist     DISCHARGE SUMMARY from hospital     DISCHARGE REPORT from the ER     OPERATIVE REPORT     MEDICATION LIST In EPIC    CLINICAL TRIAL TREATMENTS TO DATE     LABS     PATHOLOGY REPORTS     ANYTHING RELATED TO DIAGNOSIS CE Labs last updated on 8/7/2024    PATHOLOGY FEDEX TRACKING   TRACKING #:   GENONOMIC TESTING     TYPE:     IMAGING (NEED IMAGES & REPORT)     CT SCANS Requested- [facility] 4/23/2024    XRAYS In PACS 11/3/2023    ULTRASOUND     PET     IMAGE DISC FEDEX TRACKING   TRACKING #:

## 2024-11-18 RX ORDER — ALBUTEROL SULFATE 0.83 MG/ML
2.5 SOLUTION RESPIRATORY (INHALATION) ONCE
Status: COMPLETED | OUTPATIENT
Start: 2024-11-19 | End: 2024-11-19

## 2024-11-19 ENCOUNTER — HOSPITAL ENCOUNTER (OUTPATIENT)
Dept: RESPIRATORY THERAPY | Facility: CLINIC | Age: 54
Discharge: HOME | End: 2024-11-19
Payer: COMMERCIAL

## 2024-11-19 DIAGNOSIS — J98.6 DIAPHRAGM PARALYSIS: Primary | ICD-10-CM

## 2024-11-19 LAB — HGB BLD-MCNC: 15.3 G/DL (ref 13.3–17.7)

## 2024-11-19 PROCEDURE — 94726 PLETHYSMOGRAPHY LUNG VOLUMES: CPT

## 2024-11-19 PROCEDURE — 250N000009 HC RX 250: Performed by: CLINICAL NURSE SPECIALIST

## 2024-11-19 PROCEDURE — 94060 EVALUATION OF WHEEZING: CPT

## 2024-11-19 PROCEDURE — 85018 HEMOGLOBIN: CPT | Performed by: CLINICAL NURSE SPECIALIST

## 2024-11-19 PROCEDURE — 94729 DIFFUSING CAPACITY: CPT

## 2024-11-19 PROCEDURE — 999N000157 HC STATISTIC RCP TIME EA 10 MIN

## 2024-11-19 PROCEDURE — 36415 COLL VENOUS BLD VENIPUNCTURE: CPT | Performed by: CLINICAL NURSE SPECIALIST

## 2024-11-19 RX ADMIN — ALBUTEROL SULFATE 2.5 MG: 2.5 SOLUTION RESPIRATORY (INHALATION) at 07:31

## 2024-11-19 NOTE — PROGRESS NOTES
RESPIRATORY CARE NOTE    Complete Pulmonary Function Test completed by patient.  Good patient effort and cooperation. Albuterol 2.5 mg neb given for bronchodilation.  Pt was scheduled at [facility] noticed on comments that they wanted Supine also, will have to go to another facility to get supine PFT results,. RT will call customer service and have the reach out to patient to schedule. [facility] does not do supine PFT's. The results of this test meet the ATS standards for acceptability and repeatability. PT returned to baseline and left in no distress.    Janette Gonzalez, RT  11/19/2024

## 2024-11-21 LAB
DLCOCOR-%PRED-PRE: 68 %
DLCOCOR-PRE: 20.54 ML/MIN/MMHG
DLCOUNC-%PRED-PRE: 69 %
DLCOUNC-PRE: 20.93 ML/MIN/MMHG
DLCOUNC-PRED: 30.05 ML/MIN/MMHG
ERV-%PRED-PRE: 27 %
ERV-PRE: 0.48 L
ERV-PRED: 1.73 L
EXPTIME-PRE: 7.2 SEC
FEF2575-%PRED-POST: 80 %
FEF2575-%PRED-PRE: 62 %
FEF2575-POST: 2.62 L/SEC
FEF2575-PRE: 2.03 L/SEC
FEF2575-PRED: 3.24 L/SEC
FEFMAX-%PRED-PRE: 88 %
FEFMAX-PRE: 8.89 L/SEC
FEFMAX-PRED: 9.99 L/SEC
FEV1-%PRED-PRE: 71 %
FEV1-PRE: 2.67 L
FEV1FEV6-PRE: 75 %
FEV1FEV6-PRED: 80 %
FEV1FVC-PRE: 76 %
FEV1FVC-PRED: 79 %
FEV1SVC-PRE: 78 %
FEV1SVC-PRED: 77 %
FIFMAX-PRE: 5.84 L/SEC
FRCPLETH-%PRED-PRE: 82 %
FRCPLETH-PRE: 3.05 L
FRCPLETH-PRED: 3.69 L
FVC-%PRED-PRE: 74 %
FVC-PRE: 3.52 L
FVC-PRED: 4.74 L
IC-%PRED-PRE: 85 %
IC-PRE: 2.96 L
IC-PRED: 3.46 L
RVPLETH-%PRED-PRE: 107 %
RVPLETH-PRE: 2.57 L
RVPLETH-PRED: 2.39 L
TLCPLETH-%PRED-PRE: 79 %
TLCPLETH-PRE: 6.01 L
TLCPLETH-PRED: 7.53 L
VA-%PRED-PRE: 74 %
VA-PRE: 5.29 L
VC-%PRED-PRE: 71 %
VC-PRE: 3.44 L
VC-PRED: 4.82 L

## 2024-12-11 ENCOUNTER — ONCOLOGY VISIT (OUTPATIENT)
Dept: SURGERY | Facility: CLINIC | Age: 54
End: 2024-12-11
Attending: SURGERY
Payer: COMMERCIAL

## 2024-12-11 ENCOUNTER — ANCILLARY PROCEDURE (OUTPATIENT)
Dept: GENERAL RADIOLOGY | Facility: CLINIC | Age: 54
End: 2024-12-11
Attending: CLINICAL NURSE SPECIALIST
Payer: COMMERCIAL

## 2024-12-11 VITALS
RESPIRATION RATE: 16 BRPM | OXYGEN SATURATION: 94 % | SYSTOLIC BLOOD PRESSURE: 136 MMHG | HEIGHT: 72 IN | WEIGHT: 189.3 LBS | HEART RATE: 78 BPM | BODY MASS INDEX: 25.64 KG/M2 | DIASTOLIC BLOOD PRESSURE: 90 MMHG

## 2024-12-11 DIAGNOSIS — J98.6 DIAPHRAGM PARALYSIS: ICD-10-CM

## 2024-12-11 PROCEDURE — 99213 OFFICE O/P EST LOW 20 MIN: CPT | Performed by: THORACIC SURGERY (CARDIOTHORACIC VASCULAR SURGERY)

## 2024-12-11 PROCEDURE — 76000 FLUOROSCOPY <1 HR PHYS/QHP: CPT | Mod: GC | Performed by: STUDENT IN AN ORGANIZED HEALTH CARE EDUCATION/TRAINING PROGRAM

## 2024-12-11 RX ORDER — OMEPRAZOLE 40 MG/1
1 CAPSULE, DELAYED RELEASE ORAL DAILY
COMMUNITY
Start: 2024-12-09

## 2024-12-11 ASSESSMENT — PAIN SCALES - GENERAL: PAINLEVEL_OUTOF10: NO PAIN (0)

## 2024-12-11 NOTE — LETTER
12/11/2024      Enmanuel Randall  [address] [place] [address]      Dear Colleague,    Thank you for referring your patient, Enmanuel Randall, to the [facility]. Please see a copy of my visit note below.    THORACIC SURGERY - NEW PATIENT OFFICE VISIT      Dear Dr. Miller,    I saw Enmanuel Randall in consultation for the evaluation and treatment of a left diaphragm paralysis.     HPI  Enmanuel Randall is a 54 year old male who had a C spine surgery done last year and since then he has been experiencing exertional dyspnea. His work up revealed an elevated left diaphragm and he was referred for evaluation.     Previsit Tests   CT scan: elevated left hemidiaphragm.     PFT 11/19/24      Sniff test: Left diaphragm paralysis.       PMH    Past Medical History:   Diagnosis Date     Cervical radiculopathy      Duodenal ulcer 09/22/2023     Dyslipidemia      ED (erectile dysfunction)      Seborrheic dermatitis      Spinal stenosis in cervical region      Tobacco use disorder      Upper GI bleed         PSH    Past Surgical History:   Procedure Laterality Date     BACK SURGERY      C4-C5 surgery     COLONOSCOPY       ESOPHAGOSCOPY, GASTROSCOPY, DUODENOSCOPY (EGD), COMBINED N/A 09/03/2023    Procedure: ESOPHAGOGASTRODUODENOSCOPY with EPINEPHRINE INJECTION and CAUTERY and BIOPSY;  Surgeon: Eliazar Leyva MD;  Location: [facility] Main OR     HERNIA REPAIR Left     inguinal hernia repair     REPLACE DISK CERVICAL ANTERIOR N/A 10/10/2023    Procedure: Cervical 5 to cervical 6 arthroplasty;  Surgeon: Jered Salvador MD;  Location: [facility] OR         Allergies   Allergen Reactions     Fentanyl Hives and Itching     Penicillins Hives     Tolerated amoxicillin     Current Outpatient Medications   Medication Sig Dispense Refill     omeprazole (PRILOSEC) 40 MG DR capsule Take 1 capsule by mouth daily.       buPROPion (WELLBUTRIN XL) 150 MG 24 hr tablet Take 150 mg by mouth every morning.        cyclobenzaprine (FLEXERIL) 10 MG tablet Take 1 tablet (10 mg) by mouth 3 times daily as needed for muscle spasms 40 tablet 0     loratadine (CLARITIN) 10 MG tablet Take 10 mg by mouth daily (Patient not taking: Reported on 2024)       pantoprazole (PROTONIX) 40 MG EC tablet Take 1 tablet (40 mg) by mouth 2 times daily (Patient not taking: Reported on 2024) 60 tablet 0     rosuvastatin (CRESTOR) 20 MG tablet Take 20 mg by mouth daily       sildenafil (VIAGRA) 100 MG tablet Take 100 mg by mouth daily as needed.       Turmeric (QC TUMERIC COMPLEX PO) Take 2 tablets by mouth daily       Vitamin D3 (VITAMIN D, CHOLECALCIFEROL,) 25 mcg (1000 units) tablet Take 25 mcg by mouth daily       No current facility-administered medications for this visit.     Social History     Tobacco Use     Smoking status: Former     Current packs/day: 0.00     Average packs/day: 1 pack/day for 10.0 years (10.0 ttl pk-yrs)     Types: Cigarettes     Start date: 10/2/2013     Quit date: 10/2/2023     Years since quittin.1     Smokeless tobacco: Never   Vaping Use     Vaping status: Never Used   Substance Use Topics     Alcohol use: Not Currently     Drug use: Never       Physical examination  BP (!) 136/90 (BP Location: Right arm, Patient Position: Sitting, Cuff Size: Adult Regular)   Pulse 78   Resp 16   Ht 1.829 m (6')   Wt 85.9 kg (189 lb 4.8 oz)   SpO2 94%   BMI 25.67 kg/m    Alert and oriented NAD  Bilateral breath sounds.     From a personal perspective, he comes to clinic alone.       IMPRESSION   54 year old male with left diaphragm paralysis.      PLAN  I spent 30 min on the date of the encounter in chart review, patient visit, review of tests, documentation and/or discussion with other providers about the issues documented above. I reviewed the plan as follows:    Procedure planned: laparoscopic left diaphragm plication, possible thoracoscopy (unlikely).     Necessary Preop Tests & Appointments: Preoperative  assessment clinic    Anticoagulation Plan: Enoxaparin postop.      I had an extensive discussion with the patient regarding the rationale for surgery, the alternatives risks and benefits of the procedure. I explained the expected hospital stay, postoperative course, recovery time, diet and activity restrictions. Informed consent was obtained and they agreed to proceed.    I appreciate the opportunity to participate in the care of your patient and will keep you updated.    Sincerely,    Lesia Miller MD        Again, thank you for allowing me to participate in the care of your patient.        Sincerely,        Emmanuel Durán MD

## 2024-12-11 NOTE — NURSING NOTE
Oncology Rooming Note    December 11, 2024 4:56 PM   Enmanuel Randall is a 54 year old male who presents for:    Chief Complaint   Patient presents with    Oncology Clinic Visit     Diaphragm Paralysis     Initial Vitals: BP (!) 136/90 (BP Location: Right arm, Patient Position: Sitting, Cuff Size: Adult Regular)   Pulse 78   Resp 16   Ht 1.829 m (6')   Wt 85.9 kg (189 lb 4.8 oz)   SpO2 94%   BMI 25.67 kg/m   Estimated body mass index is 25.67 kg/m  as calculated from the following:    Height as of this encounter: 1.829 m (6').    Weight as of this encounter: 85.9 kg (189 lb 4.8 oz). Body surface area is 2.09 meters squared.  No Pain (0) Comment: Data Unavailable   No LMP for male patient.  Allergies reviewed: Yes  Medications reviewed: Yes    Medications: Medication refills not needed today.  Pharmacy name entered into Bloxr:    [id number] - [place], [place] - [address] [id number] [place], [place] - [address] [facility] [address] [place] [address] DRUG STORE #[id number] [place], [place] - [address] AT [facility] [place], [place] - [address]    Frailty Screening:   Is the patient here for a new oncology consult visit in cancer care? 2. No      Clinical concerns: Patient is new.       Genevieve Evans LPN  12/11/2024

## 2024-12-11 NOTE — PROGRESS NOTES
THORACIC SURGERY - NEW PATIENT OFFICE VISIT      Dear Dr. Miller,    I saw Enmanuel Randall in consultation for the evaluation and treatment of a left diaphragm paralysis.     HPI  Enmanuel Randall is a 54 year old male who had a C spine surgery done last year and since then he has been experiencing exertional dyspnea. His work up revealed an elevated left diaphragm and he was referred for evaluation.     Previsit Tests   CT scan: elevated left hemidiaphragm.     PFT 11/19/24      Sniff test: Left diaphragm paralysis.       PMH    Past Medical History:   Diagnosis Date    Cervical radiculopathy     Duodenal ulcer 09/22/2023    Dyslipidemia     ED (erectile dysfunction)     Seborrheic dermatitis     Spinal stenosis in cervical region     Tobacco use disorder     Upper GI bleed         PSH    Past Surgical History:   Procedure Laterality Date    BACK SURGERY      C4-C5 surgery    COLONOSCOPY      ESOPHAGOSCOPY, GASTROSCOPY, DUODENOSCOPY (EGD), COMBINED N/A 09/03/2023    Procedure: ESOPHAGOGASTRODUODENOSCOPY with EPINEPHRINE INJECTION and CAUTERY and BIOPSY;  Surgeon: Eliazar Leyva MD;  Location: [facility] Main OR    HERNIA REPAIR Left     inguinal hernia repair    REPLACE DISK CERVICAL ANTERIOR N/A 10/10/2023    Procedure: Cervical 5 to cervical 6 arthroplasty;  Surgeon: Jered Salvador MD;  Location: [facility] OR         Allergies   Allergen Reactions    Fentanyl Hives and Itching    Penicillins Hives     Tolerated amoxicillin     Current Outpatient Medications   Medication Sig Dispense Refill    omeprazole (PRILOSEC) 40 MG DR capsule Take 1 capsule by mouth daily.      buPROPion (WELLBUTRIN XL) 150 MG 24 hr tablet Take 150 mg by mouth every morning.      cyclobenzaprine (FLEXERIL) 10 MG tablet Take 1 tablet (10 mg) by mouth 3 times daily as needed for muscle spasms 40 tablet 0    loratadine (CLARITIN) 10 MG tablet Take 10 mg by mouth daily (Patient not taking: Reported on 11/4/2024)      pantoprazole (PROTONIX)  40 MG EC tablet Take 1 tablet (40 mg) by mouth 2 times daily (Patient not taking: Reported on 2024) 60 tablet 0    rosuvastatin (CRESTOR) 20 MG tablet Take 20 mg by mouth daily      sildenafil (VIAGRA) 100 MG tablet Take 100 mg by mouth daily as needed.      Turmeric (QC TUMERIC COMPLEX PO) Take 2 tablets by mouth daily      Vitamin D3 (VITAMIN D, CHOLECALCIFEROL,) 25 mcg (1000 units) tablet Take 25 mcg by mouth daily       No current facility-administered medications for this visit.     Social History     Tobacco Use    Smoking status: Former     Current packs/day: 0.00     Average packs/day: 1 pack/day for 10.0 years (10.0 ttl pk-yrs)     Types: Cigarettes     Start date: 10/2/2013     Quit date: 10/2/2023     Years since quittin.1    Smokeless tobacco: Never   Vaping Use    Vaping status: Never Used   Substance Use Topics    Alcohol use: Not Currently    Drug use: Never       Physical examination  BP (!) 136/90 (BP Location: Right arm, Patient Position: Sitting, Cuff Size: Adult Regular)   Pulse 78   Resp 16   Ht 1.829 m (6')   Wt 85.9 kg (189 lb 4.8 oz)   SpO2 94%   BMI 25.67 kg/m    Alert and oriented NAD  Bilateral breath sounds.     From a personal perspective, he comes to clinic alone.       IMPRESSION   54 year old male with left diaphragm paralysis.      PLAN  I spent 30 min on the date of the encounter in chart review, patient visit, review of tests, documentation and/or discussion with other providers about the issues documented above. I reviewed the plan as follows:    Procedure planned: laparoscopic left diaphragm plication, possible thoracoscopy (unlikely).     Necessary Preop Tests & Appointments: Preoperative assessment clinic    Anticoagulation Plan: Enoxaparin postop.      I had an extensive discussion with the patient regarding the rationale for surgery, the alternatives risks and benefits of the procedure. I explained the expected hospital stay, postoperative course, recovery  time, diet and activity restrictions. Informed consent was obtained and they agreed to proceed.    I appreciate the opportunity to participate in the care of your patient and will keep you updated.    Sincerely,    Lesia Miller MD

## 2024-12-12 ENCOUNTER — TELEPHONE (OUTPATIENT)
Dept: SURGERY | Facility: CLINIC | Age: 54
End: 2024-12-12
Payer: COMMERCIAL

## 2024-12-12 DIAGNOSIS — J98.6 DIAPHRAGM PARALYSIS: Primary | ICD-10-CM

## 2024-12-12 NOTE — TELEPHONE ENCOUNTER
Called pt to offer 12/23 surgery date with Dr. Durán and got no answer. Left voicemail message with direct call back number [phone].    Sarah Beth Arreola on 12/12/2024 at 2:17 PM

## 2024-12-12 NOTE — TELEPHONE ENCOUNTER
Received vcm from pt req call back.   Spoke with patient to schedule procedure with Dr. Durán   Procedure was scheduled on 12/23 at [facility] OR  Patient will have H&P with PAC    Informed pt of surgery details:  Date:    Monday, December 23, 2024  Arrival Time:  5:30 am    Pt is aware surgery start time may change, and someone will reach out with the new arrival time, if so.    Patient is aware a COVID-19 test is needed before their procedure ONLY IF symptomatic.   (Patient is aware Thoracic is no longer requiring COVID-19 test)       Patient is aware a / is needed day of surgery.   Surgery Letter was sent via [employer],     Patient has my direct contact information for any further questions.      Appointments in Next Year      Dec 16, 2024 12:15 PM  (Arrive by 12:00 PM)  PAC EVALUATION with Mamta Box PA-C  [facility] [place] ([facility] ) [phone]     Dec 16, 2024 1:15 PM  LAB with [facility]  [facility] [place] ([facility] ) [phone]     Jan 20, 2025 2:25 PM  (Arrive by 2:10 PM)  Xray General with [email or web address]  [facility]ray [place] ([facility] ) [phone]     Jan 20, 2025 3:00 PM  (Arrive by 2:45 PM)  Return Patient with ALEXANDRA Flores  [facility] ([facility] ) [phone]

## 2024-12-13 ENCOUNTER — PATIENT OUTREACH (OUTPATIENT)
Dept: SURGERY | Facility: CLINIC | Age: 54
End: 2024-12-13
Payer: COMMERCIAL

## 2024-12-13 NOTE — TELEPHONE ENCOUNTER
Patient scheduled for Laparoscopic Diaphragm Plication with Dr Durán on 12/23/2024.     Called to review Pre-op Education. 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 Reviewed post-operative expectations such as but not limited to estimated hospital length of stay, possibility of chest tube post op, pain management (including neuropathic pain), activity progression and constipation prevention. Patient instructed to stop ibuprofen, naproxen, NSAIDS, fish oil/flax seed oil, Vit E, vitamin/mineral/herbal supplements (including Tumeric) one week before surgery.    Patient is aware he will have an overnoc hospital stay, will need a  after hospital discharge and a responsible caregiver to stay with them for 48 hours afterwards. The patient was instructed to notify the provider if there are any signs of a cold/flu/fever prior to surgery. The patient was instructed to avoid smoking tobacco/marijuana, vaping, chewing tobacco, or drinking alcohol for 1 week prior to surgery.     The patient was notified of post-op restrictions including: No lifting more than 20-30 pounds/sit-ups (Core) activities for 12weeks;  lifetime lifting restriction of no more than 40-50 pounds to prevent recurrence; no drastic changes in air pressure (no flying or scuba diving) for 4-8 weeks. Patient informed he should continue to use their incentive spirometer and ambulation post-op until they feel they have returned to their regular level of activity. Patient is aware she needs to remain well hydrated and eat a protein-rich diet post op.    20 minutes spent reviewing surgical expectations. Questions encouraged and answered questions as able. Patient verbalized understanding and in agreement with plan. Informed patient that Dr Durán would like him to complete a [facility] Respiratory Questionnaire prior to surgery. Notified patient that this will be sent to him via [employer] and that he can complete and submit  Questionnaire online.      Patient verbalized that ASL is his first language and has been told that he will often sign when coming out of Anesthesia. Patient's  is deaf and requires ASL,  will be with patient pre-op and post-op. Patient is requesting  for the entire day of surgery and  needs to be present in the PACU.       Patient given contact information and will reach out to care coordinator with additional questions or concerns.       Patient appreciated writer's call.     Tarah Lindquist RN BSN   Thoracic Surgery RN Care Coordinator

## 2024-12-16 ENCOUNTER — ANESTHESIA EVENT (OUTPATIENT)
Dept: SURGERY | Facility: CLINIC | Age: 54
End: 2024-12-16
Payer: COMMERCIAL

## 2024-12-16 NOTE — TELEPHONE ENCOUNTER
FUTURE VISIT INFORMATION        SURGERY INFORMATION:  Date: 12/23/24  Location: [facility] or  Surgeon:  Lesia Mora MD   Anesthesia Type:  general  Procedure: PLICATION, DIAPHRAGM, LAPAROSCOPIC   Consult: ov 12/11/24     RECORDS REQUESTED FROM:         Primary Care Provider: Fiorella Santillan APRN, CNP - [facility]     Most recent EKG+ Tracing: 10/10/23     Most recent PFT's: 11/19/24

## 2024-12-17 LAB
ABO + RH BLD: NORMAL
BLD GP AB SCN SERPL QL: NEGATIVE
SPECIMEN EXP DATE BLD: NORMAL

## 2024-12-18 ENCOUNTER — OFFICE VISIT (OUTPATIENT)
Dept: SURGERY | Facility: CLINIC | Age: 54
End: 2024-12-18
Payer: COMMERCIAL

## 2024-12-18 ENCOUNTER — LAB (OUTPATIENT)
Dept: LAB | Facility: CLINIC | Age: 54
End: 2024-12-18
Payer: COMMERCIAL

## 2024-12-18 ENCOUNTER — PRE VISIT (OUTPATIENT)
Dept: SURGERY | Facility: CLINIC | Age: 54
End: 2024-12-18

## 2024-12-18 VITALS
SYSTOLIC BLOOD PRESSURE: 149 MMHG | WEIGHT: 188.2 LBS | HEART RATE: 63 BPM | DIASTOLIC BLOOD PRESSURE: 100 MMHG | RESPIRATION RATE: 16 BRPM | OXYGEN SATURATION: 97 % | BODY MASS INDEX: 25.49 KG/M2 | HEIGHT: 72 IN | TEMPERATURE: 98.6 F

## 2024-12-18 DIAGNOSIS — Z01.818 PREOP EXAMINATION: ICD-10-CM

## 2024-12-18 DIAGNOSIS — J98.6 DIAPHRAGM PARALYSIS: ICD-10-CM

## 2024-12-18 DIAGNOSIS — Z01.818 PREOP EXAMINATION: Primary | ICD-10-CM

## 2024-12-18 PROCEDURE — 36415 COLL VENOUS BLD VENIPUNCTURE: CPT | Performed by: PATHOLOGY

## 2024-12-18 PROCEDURE — 99203 OFFICE O/P NEW LOW 30 MIN: CPT | Performed by: CLINICAL NURSE SPECIALIST

## 2024-12-18 ASSESSMENT — LIFESTYLE VARIABLES: TOBACCO_USE: 1

## 2024-12-18 ASSESSMENT — ENCOUNTER SYMPTOMS
DYSRHYTHMIAS: 0
SEIZURES: 0

## 2024-12-18 ASSESSMENT — PAIN SCALES - GENERAL: PAINLEVEL_OUTOF10: NO PAIN (0)

## 2024-12-18 NOTE — H&P
Pre-Operative H & P     CC:  Preoperative exam to assess for increased cardiopulmonary risk while undergoing surgery and anesthesia.    Date of Encounter: 12/18/2024  Primary Care Physician:  Ignacio Miller     Reason for visit:   Encounter Diagnoses   Name Primary?    Preop examination Yes    Diaphragm paralysis        HPI  Enmanuel Randall is a 54 year old male who presents for pre-operative H & P in preparation for  Procedure Information       Case: [id number] Date/Time: 12/23/24 0730    Procedure: PLICATION, DIAPHRAGM, LAPAROSCOPIC (Left: Abdomen)    Anesthesia type: General    Diagnosis: Diaphragm paralysis [J98.6]    Pre-op diagnosis: Diaphragm paralysis [J98.6]    Location:  OR [address] [place] [address] OR    Providers: Lesia Mora MD          History is obtained from the patient and chart review    The patient is a 54 year old man who has a past medical history significant for dyslipidemia, former smoker, GERD, duodenal ulcer 2/2 to ibuprofen use, ED, history of transfusion (9/4/23) and a recent C5-6 arthroplasty. 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 Given this diagnosis he was seen by Dr. Miller on 12/11/24 and counseled for the procedure as above.       Hx of abnormal bleeding or anti-platelet use: Denies       Past Medical History  Past Medical History:   Diagnosis Date    Cervical radiculopathy     Diaphragm paralysis     Duodenal ulcer 09/22/2023    Dyslipidemia     ED (erectile dysfunction)     Seborrheic dermatitis     Spinal stenosis in cervical region     Tobacco use disorder     Upper GI bleed        Past Surgical History  Past Surgical History:   Procedure Laterality Date    BACK SURGERY      C4-C5 surgery    COLONOSCOPY      ESOPHAGOSCOPY, GASTROSCOPY, DUODENOSCOPY (EGD), COMBINED N/A 09/03/2023    Procedure: ESOPHAGOGASTRODUODENOSCOPY with EPINEPHRINE INJECTION and CAUTERY and BIOPSY;  Surgeon: Eliazar Leyva MD;   Location: [facility] Main OR    HERNIA REPAIR Left     inguinal hernia repair    REPLACE DISK CERVICAL ANTERIOR N/A 10/10/2023    Procedure: Cervical 5 to cervical 6 arthroplasty;  Surgeon: Jered Salvador MD;  Location:  OR       Prior to Admission Medications  Current Outpatient Medications   Medication Sig Dispense Refill    buPROPion (WELLBUTRIN XL) 150 MG 24 hr tablet Take 150 mg by mouth every morning.      omeprazole (PRILOSEC) 40 MG DR capsule Take 1 capsule by mouth every morning.      rosuvastatin (CRESTOR) 20 MG tablet Take 20 mg by mouth at bedtime.      sildenafil (VIAGRA) 100 MG tablet Take 100 mg by mouth daily as needed.      loratadine (CLARITIN) 10 MG tablet Take 10 mg by mouth daily (Patient not taking: Reported on 2024)      Turmeric (QC TUMERIC COMPLEX PO) Take 2 tablets by mouth daily (Patient not taking: Reported on 2024)      Vitamin D3 (VITAMIN D, CHOLECALCIFEROL,) 25 mcg (1000 units) tablet Take 25 mcg by mouth daily (Patient not taking: Reported on 2024)         Allergies  Allergies   Allergen Reactions    Fentanyl Hives and Itching    Penicillins Hives     Tolerated amoxicillin       Social History  Social History     Socioeconomic History    Marital status:      Spouse name: Not on file    Number of children: Not on file    Years of education: Not on file    Highest education level: Not on file   Occupational History    Not on file   Tobacco Use    Smoking status: Former     Current packs/day: 0.00     Average packs/day: 1 pack/day for 10.0 years (10.0 ttl pk-yrs)     Types: Cigarettes     Start date: 10/2/2013     Quit date: 10/2/2023     Years since quittin.2    Smokeless tobacco: Never   Vaping Use    Vaping status: Never Used   Substance and Sexual Activity    Alcohol use: Yes     Comment: 1-2x per month    Drug use: Never    Sexual activity: Not on file   Other Topics Concern    Not on file   Social History Narrative    Not on file     Social Drivers  of [facility]     Financial Resource Strain: Not on file   Food Insecurity: No Food Insecurity (12/17/2024)    Received from [email or web address]    Hunger Vital Sign     Worried About Running Out of Food in the Last Year: Never true     Ran Out of Food in the Last Year: Never true   Transportation Needs: No Transportation Needs (12/17/2024)    Received from [email or web address]    PRAPARE - Transportation     Lack of Transportation (Medical): No     Lack of Transportation (Non-Medical): No   Physical Activity: Insufficiently Active (1/11/2019)    Received from [facility]     Exercise Vital Sign     Days of Exercise per Week: 3 days     Minutes of Exercise per Session: 30 min   Stress: No Stress Concern Present (1/11/2019)    Received from [facility]     [place] [employer] - Occupational Stress Questionnaire     Feeling of Stress : Not at all   Social Connections: Socially Integrated (1/11/2019)    Received from [facility]     Social Connection and Isolation Panel [NHANES]     Frequency of Communication with Friends and Family: More than three times a week     Frequency of Social Gatherings with Friends and Family: Once a week     Attends Restorationist Services: 1 to 4 times per year     Active Member of Clubs or Organizations: Yes     Attends Club or Organization Meetings: More than 4 times per year     Marital Status:    Interpersonal Safety: Not At Risk (1/11/2019)    Received from [facility]     Humiliation, Afraid, Rape, and Kick questionnaire     Fear of Current or Ex-Partner: No     Emotionally Abused: No     Physically Abused: No     Sexually Abused: No   Housing Stability: Low Risk  (12/17/2024)    Received from [email or web address]    Housing Stability Vital Sign     Unable to Pay for Housing in the Last Year: No     Number of Times Moved in the Last Year: 0      Homeless in the Last Year: No       Family History  Family History   Problem Relation Age of Onset    Heart Disease Mother     Post Operative Nausea and Vomiting Mother     Rheumatoid Arthritis Father     Cancer Father     Hypertension Father     Hyperlipidemia Father     Cerebrovascular Disease Father     Heart Disease Sister     Hyperlipidemia Sister     Ulcers Sister     Cancer Sister     Rheumatoid Arthritis Sister     Diabetes Brother     Hypertension Brother     Bleeding Disorder No family hx of     Clotting Disorder No family hx of        Review of Systems  The complete review of systems is negative other than noted in the HPI or here.   Anesthesia Evaluation   Pt has had prior anesthetic. Type: General and MAC.    History of anesthetic complications  - .  During colonoscopy 2 years ago broke out in hives.  Was attributed to fentanyl which is on allergy list.  No issues since.    ROS/MED HX  ENT/Pulmonary: Comment: Left diaphragm paralysis with dyspnea on exertion, and inability to burp without specific position changes    (+)                tobacco use, Past use,                    (-) recent URI   Neurologic: Comment: S/p C5-6 arthroplasty    (-) no seizures, no CVA and no TIA   Cardiovascular:     (+) Dyslipidemia - -   -  - -           DOWNEY.                      Previous cardiac testing   Echo: Date: Results:    Stress Test:  Date: Results:    ECG Reviewed:  Date: 10/10/23 Results:  Sinus tachycardia   Minimal voltage criteria for LVH, may be normal variant   Borderline ECG     Cath:  Date: Results:   (-) taking anticoagulants/antiplatelets and arrhythmias   METS/Exercise Tolerance: 3 - Able to walk 1-2 blocks without stopping Comment: Patient previously able to bike 4 miles, now with decreased exercise tolerance due to diaphragm paralysis   Hematologic:     (+)       history of blood transfusion, no previous transfusion reaction,     (-) anemia   Musculoskeletal:  - neg musculoskeletal ROS     GI/Hepatic:  Comment: Duodenal ulcer with history of GI bleed in 2023    (+) GERD, Asymptomatic on medication,                  Renal/Genitourinary: Comment: ED      Endo:  - neg endo ROS  (-) Type II DM   Psychiatric/Substance Use:  - neg psychiatric ROS  (-) psychiatric history   Infectious Disease:  - neg infectious disease ROS     Malignancy:  - neg malignancy ROS     Other:  - neg other ROS          BP (!) 149/100 (BP Location: Right arm, Patient Position: Sitting, Cuff Size: Adult Regular)   Pulse 63   Temp 98.6  F (37  C) (Oral)   Resp 16   Ht 1.829 m (6')   Wt 85.4 kg (188 lb 3.2 oz)   SpO2 97%   BMI 25.52 kg/m      Physical Exam   Constitutional: Awake, alert, cooperative, no apparent distress, and appears stated age.  Eyes: Pupils equal, round and reactive to light, extra ocular muscles intact, sclera clear, conjunctiva normal.  Glasses on  HENT: Normocephalic, oral pharynx with moist mucus membranes, good dentition. No goiter appreciated. Well-healed surgical incision left neck.  Respiratory: Clear to auscultation bilaterally, no crackles or wheezing.  No cough or obvious dyspnea  Cardiovascular: Regular rate and rhythm, normal S1 and S2, and no murmur noted.  Carotids +2, no bruits. No edema. Palpable pulses to radial  DP and PT arteries.   GI: Normal bowel sounds, soft, non-distended, non-tender, no masses palpated, no hepatosplenomegaly.    Lymph/Hematologic: No cervical lymphadenopathy and no supraclavicular lymphadenopathy.    Genitourinary: Deferred  Skin: Warm and dry.  No rashes at anticipated surgical site.   Musculoskeletal: Mildly limited ROM of neck. There is no redness, warmth, or swelling of the joints. Gross motor strength is normal.    Neurologic: Awake, alert, oriented to name, place and time. Cranial nerves II-XII are grossly intact. Gait is normal.   Neuropsychiatric: Calm, cooperative. Normal affect.     Prior Labs/Diagnostic Studies   All labs and imaging personally reviewed   OSH  12/17/2024  Sodium 137  Potassium 4.6  Chloride 104  CO2 25   Calcium 9.2  Creatinine 1.13    WBC 7.1  Hemoglobin 15.5  Hematocrit 46.1  Platelets 215    EKG: 10/10/23 Sinus tachycardia, rate 103    Xray chest/heart 12/11/24                                                     Impression:  Findings consistent with left hemidiaphragm paralysis.        Latest Ref Rng & Units 11/19/2024     7:04 AM   PFT   FVC L 3.52    FEV1 L 2.67    FVC% % 74    FEV1% % 71          The patient's records and results personally reviewed by this provider.     Outside records reviewed from: Care Everywhere    LAB/DIAGNOSTIC STUDIES TODAY: Type and screen    Assessment    Enmanuel Randall is a 54 year old male seen as a PAC referral for risk assessment and optimization for anesthesia.    Plan/Recommendations  Pt will be optimized for the proposed procedure.  See below for details on the assessment, risk, and preoperative recommendations    Anesthesia concern: Patient reports colonoscopy 2 years ago.  He was sedated and began to break out in hives.  When he was awake he felt a burning sensation.  He was given Benadryl, and possibly another medication for treatment.  He was able to discharge to home and hives resolved quickly.  Since that time fentanyl has been on his allergy list.  He has had anesthesia since that time and fentanyl has been avoided.    Postop nausea and vomiting and mother    NEUROLOGY  - No history of TIA, CVA or seizure  Status post C5-6 on 10/10/2023    -Post Op delirium risk factors:  No risk identified    ENT  - No current airway concerns.  Will need to be reassessed day of surgery.  Mallampati: II  TM: > 3  Upper/lower permanent retainers  Mildly limited neck extension  Anesthesia records available    Denies swallowing difficulty    CARDIAC  Hyperlipidemia. Crestor at bedtime.  Elevated BP today with diastolic of 100.  Patient reports being nervous, and is currently drinking coffee.  He has had no history of  hypertension.  Recent average blood pressures have been in the 130s over 80s in clinic settings. No other cardiac history, symptoms, or meds.  Typically has excellent exercise tolerance, biking for miles.  More recent decrease in exercise tolerance attributed to left diaphragm paralysis.  Patient denies chest pain, or irregular heart rate.  - METS (Metabolic Equivalents)<4    RCRI: 0.9% risk of serious cardiac events    PULMONARY  Wellbutrin for smoking cessation and will take on day of surgery  As needed use of Claritin  Left diaphragm paralysis with dyspnea on exertion and inability to burp normally.   Denies asthma or use of inhaler  Low risk for JOSIAS  - Tobacco History    History   Smoking Status    Former    Types: Cigarettes   Smokeless Tobacco    Never       GI History of duodenal ulcer with GI beed in 2023.  Denies reflux or heartburn symptoms.  Will take omeprazole on day of surgery  PONV Low Risk  Total Score: 1           1 AN PONV: Patient is not a current smoker        /RENAL  - Baseline Creatinine 1.13    ENDOCRINE    - BMI: Estimated body mass index is 25.52 kg/m  as calculated from the following:    Height as of this encounter: 1.829 m (6').    Weight as of this encounter: 85.4 kg (188 lb 3.2 oz).  Overweight (BMI 25.0-29.9)  - No history of Diabetes Mellitus  Last A1c 5.8    HEME  VTE Low Risk 0.26%             Total Score: 0      Denies personal or family history of blood clots  Past history of blood transfusions in 2023  Updated type and screen drawn today  Last CBC normal    MSK  Patient is NOT Frail             Total Score: 0        PSYCH  -Anxious about upcoming procedure    Different anesthesia methods/types have been discussed with the patient, but they are aware that the final plan will be decided by the assigned anesthesia provider on the date of service.    The patient is optimized for their procedure. AVS with information on surgery time/arrival time, meds and NPO status given by nursing  staff. No further diagnostic testing indicated.      On the day of service:     Prep time: 15 minutes  Visit time: 13 minutes  Documentation time: 15 minutes  ------------------------------------------  Total time: 43 minutes      ALEXANDRA Cowan CNS  Preoperative Assessment Center  [facility]  Clinic and Surgery Center  Phone: [phone]  Fax: [phone]

## 2024-12-18 NOTE — PATIENT INSTRUCTIONS
Preparing for Your Surgery      Name:  Enmanuel Randall   MRN:  [id number]   :  1970   Today's Date:  2024       Arriving for surgery:  Surgery date:  24  Arrival time:  5:30 am    Please come to:     [facility]    [address] [place]  [address]     The [facility] [place] Patient/Visitor Ramp is at [address]. Patients and visitors who self-park will receive the reduced hospital parking rate. If the Patient /Visitor Ramp is full, please follow the signs to the [employer] car park located at the main hospital entrance.       parking is available (24 hours/ 7 days a week)      Discounted parking pass options are available for patients and visitors. They can be purchased at the [employer] desk at the [facility] hospital entrance.     -  Stop at the security desk and they will direct surgery patients to Registration and the Surgery Check in and Family Lounge. [phone]        - If you need directions, a wheelchair or an escort please stop at the Information/security desk in the lobby.     What can I eat or drink?  -  You may eat and drink normally up to 8 hours prior to arrival time. (Until 9:30 pm 24)  -  You may have clear liquids until 2 hours prior to arrival time. (Until 3:30 am)    Examples of clear liquids:  Water  Clear broth  Juices (apple, white grape, white cranberry  and cider) without pulp  Noncarbonated, powder based beverages  (lemonade and Grant-Aid)  Sodas (Sprite, 7-Up, ginger ale and seltzer)  Coffee or tea (without milk or cream)  Gatorade    -  No Alcohol or cannabis products for at least 24 hours before surgery.     Which medicines can I take?  Hold Aspirin for 7 days before surgery.   Hold Multivitamins for 7 days before surgery.  Hold Supplements for 7 days before surgery. Reports not taking Turmeric and Vitamin D3 currently.  Hold Ibuprofen (Advil,  Motrin) for 1 day before surgery--unless otherwise directed by surgeon.  Hold Naproxen (Aleve) for 4 days before surgery.  Acetaminophen (Tylenol) is okay to take if needed.    Hold Sildenafil (Viagra) for 24 hours prior to surgery.      -  PLEASE TAKE these medications the day of surgery:  Bupropion (Wellbutrin XL)  Omeprazole (Prilosec)  Acetaminophen (Tylenol) if needed      How do I prepare myself?  - Please take 2 showers (one the night prior to surgery and one the morning of surgery) using Scrubcare or Hibiclens soap.   You may use your own shampoo and conditioner. No other hair products.     Use this soap only from the neck to your toes. Avoid genital area      Leave the soap on your skin for one minute--then rinse thoroughly.     - Please remove all jewelry and body piercings.  - No lotions, deodorants or fragrance.  - No makeup or fingernail polish.   - Bring your ID and insurance card.    -If you use a CPAP machine, please bring the CPAP machine, tubing, and mask to hospital.    -If you have a Deep Brain Stimulator, Spinal Cord Stimulator, or any Neuro Stimulator device---you must bring the remote control to the hospital.      ALL PATIENTS GOING HOME THE SAME DAY OF SURGERY ARE REQUIRED TO HAVE A RESPONSIBLE ADULT TO DRIVE AND BE IN ATTENDANCE WITH THEM FOR 24 HOURS FOLLOWING SURGERY.    Covid testing policy as of 12/06/2022  Your surgeon will notify and schedule you for a COVID test if one is needed before surgery--please direct any questions or COVID symptoms to your surgeon      Questions or Concerns:    - For any questions regarding the day of surgery or your hospital stay, please contact the Pre Admission Nursing Office at [phone].       - If you have health changes between today and your surgery, please call your surgeon.       - For questions after surgery, please call your surgeons office.           Current Visitor Guidelines    You may have 2 visitors in the pre op area.    Visiting hours: 8  a.m. to 8:30 p.m.    Patients confirmed or suspected to have symptoms of COVID 19 or flu:     No visitors allowed for adult patients.   Children (under age 18) can have 1 named visitor.     People who are sick or showing symptoms of COVID 19 or flu:    Are not allowed to visit patients--we can only make exceptions in special situations.       Please follow these guidelines for your visit:          Please maintain social distance          Masking is optional--however at times you may be asked to wear a mask for the safety of yourself and others     Clean your hands with alcohol hand . Do this when you arrive at and leave the building and patient room,    And again after you touch your mask or anything in the room.     Go directly to and from the room you are visiting.     Stay in the patient s room during your visit. Limit going to other places in the hospital as much as possible     Leave bags and jackets at home or in the car.     For everyone s health, please don t come and go during your visit. That includes for smoking   during your visit.

## 2024-12-22 RX ORDER — NALOXONE HYDROCHLORIDE 0.4 MG/ML
0.1 INJECTION, SOLUTION INTRAMUSCULAR; INTRAVENOUS; SUBCUTANEOUS
Status: CANCELLED | OUTPATIENT
Start: 2024-12-22

## 2024-12-22 RX ORDER — ONDANSETRON 2 MG/ML
4 INJECTION INTRAMUSCULAR; INTRAVENOUS EVERY 30 MIN PRN
Status: CANCELLED | OUTPATIENT
Start: 2024-12-22

## 2024-12-22 RX ORDER — OXYCODONE HYDROCHLORIDE 5 MG/1
5 TABLET ORAL
Status: CANCELLED | OUTPATIENT
Start: 2024-12-22

## 2024-12-22 RX ORDER — ONDANSETRON 4 MG/1
4 TABLET, ORALLY DISINTEGRATING ORAL EVERY 30 MIN PRN
Status: CANCELLED | OUTPATIENT
Start: 2024-12-22

## 2024-12-22 RX ORDER — DEXAMETHASONE SODIUM PHOSPHATE 4 MG/ML
4 INJECTION, SOLUTION INTRA-ARTICULAR; INTRALESIONAL; INTRAMUSCULAR; INTRAVENOUS; SOFT TISSUE
Status: CANCELLED | OUTPATIENT
Start: 2024-12-22

## 2024-12-22 RX ORDER — OXYCODONE HYDROCHLORIDE 10 MG/1
10 TABLET ORAL
Status: CANCELLED | OUTPATIENT
Start: 2024-12-22

## 2024-12-22 ASSESSMENT — LIFESTYLE VARIABLES: TOBACCO_USE: 1

## 2024-12-22 ASSESSMENT — ENCOUNTER SYMPTOMS
SEIZURES: 0
DYSRHYTHMIAS: 0

## 2024-12-22 NOTE — ANESTHESIA PREPROCEDURE EVALUATION
Anesthesia Pre-Procedure Evaluation    Patient: Enmanuel Randall   MRN: [id number] : 1970        Procedure : Procedure(s):  PLICATION, DIAPHRAGM, LAPAROSCOPIC          Past Medical History:   Diagnosis Date    Cervical radiculopathy     Diaphragm paralysis     Duodenal ulcer 2023    Dyslipidemia     ED (erectile dysfunction)     Seborrheic dermatitis     Spinal stenosis in cervical region     Tobacco use disorder     Upper GI bleed       Past Surgical History:   Procedure Laterality Date    BACK SURGERY      C4-C5 surgery    COLONOSCOPY      ESOPHAGOSCOPY, GASTROSCOPY, DUODENOSCOPY (EGD), COMBINED N/A 2023    Procedure: ESOPHAGOGASTRODUODENOSCOPY with EPINEPHRINE INJECTION and CAUTERY and BIOPSY;  Surgeon: Eliazar Leyva MD;  Location: [facility]    HERNIA REPAIR Left     inguinal hernia repair    REPLACE DISK CERVICAL ANTERIOR N/A 10/10/2023    Procedure: Cervical 5 to cervical 6 arthroplasty;  Surgeon: Jered Salvador MD;  Location: [facility]      Allergies   Allergen Reactions    Fentanyl Hives and Itching    Penicillins Hives     Tolerated amoxicillin      Social History     Tobacco Use    Smoking status: Former     Current packs/day: 0.00     Average packs/day: 1 pack/day for 10.0 years (10.0 ttl pk-yrs)     Types: Cigarettes     Start date: 10/2/2013     Quit date: 10/2/2023     Years since quittin.2    Smokeless tobacco: Never   Substance Use Topics    Alcohol use: Yes     Comment: 1-2x per month      Wt Readings from Last 1 Encounters:   24 85.4 kg (188 lb 3.2 oz)        Anesthesia Evaluation   Pt has had prior anesthetic. Type: General and MAC (Rash with fentanyl; Dilaudid ok.).    History of anesthetic complications  - .  During colonoscopy 2 years ago broke out in hives.  Was attributed to fentanyl which is on allergy list.  No issues since.    ROS/MED HX  ENT/Pulmonary: Comment: Diaphragm paralysis    (+)                tobacco use, Past use, 1 packs/day, 10   Pack-Year Hx,                   (-) recent URI   Neurologic: Comment: Cervical stenosis w/radiculopathy   (-) no seizures, no CVA and no TIA   Cardiovascular:     (+) Dyslipidemia - -   -  - -           DOWNEY.                      Previous cardiac testing   Echo: Date: Results:    Stress Test:  Date: Results:    ECG Reviewed:  Date: 10/10/23 Results:  Sinus tachycardia   Minimal voltage criteria for LVH, may be normal variant   Borderline ECG     Cath:  Date: Results:   (-) taking anticoagulants/antiplatelets and arrhythmias   METS/Exercise Tolerance: 3 - Able to walk 1-2 blocks without stopping Comment: Patient previously able to bike 4 miles, now with decreased exercise tolerance due to diaphragm paralysis   Hematologic:     (+)       history of blood transfusion, no previous transfusion reaction,     (-) anemia   Musculoskeletal:  - neg musculoskeletal ROS     GI/Hepatic: Comment: #Duodenal ulcer  #UGI      (+) GERD, Asymptomatic on medication,                  Renal/Genitourinary: Comment: ED      Endo:  - neg endo ROS  (-) Type II DM   Psychiatric/Substance Use:  - neg psychiatric ROS  (-) psychiatric history   Infectious Disease:  - neg infectious disease ROS     Malignancy:  - neg malignancy ROS     Other:  - neg other ROS          Physical Exam    Airway        Mallampati: II   TM distance: > 3 FB   Neck ROM: full   Mouth opening: > 3 cm    Respiratory Devices and Support         Dental       (+) Minor Abnormalities - some fillings, tiny chips      Cardiovascular   cardiovascular exam normal          Pulmonary   pulmonary exam normal                OUTSIDE LABS:  CBC:   Lab Results   Component Value Date    WBC 8.7 10/28/2023    WBC 12.3 (H) 10/10/2023    HGB 15.3 11/19/2024    HGB 13.6 10/28/2023    HCT 42.3 10/28/2023    HCT 36.9 (L) 10/10/2023     10/28/2023     10/10/2023     BMP:   Lab Results   Component Value Date     10/28/2023     10/10/2023    POTASSIUM 4.0 10/28/2023     "POTASSIUM 4.4 10/10/2023    CHLORIDE 100 10/28/2023    CHLORIDE 100 10/10/2023    CO2 25 10/28/2023    CO2 21 (L) 10/10/2023    BUN 16.7 10/28/2023    BUN 15.1 10/10/2023    CR 0.99 10/28/2023    CR 0.95 10/10/2023     (H) 10/28/2023     (H) 10/10/2023     COAGS:   Lab Results   Component Value Date    PTT 23 09/02/2023    INR 0.95 09/02/2023     POC: No results found for: \"BGM\", \"HCG\", \"HCGS\"  HEPATIC:   Lab Results   Component Value Date    ALBUMIN 3.3 (L) 09/02/2023    PROTTOTAL 5.9 (L) 09/02/2023    ALT 18 09/02/2023    AST 19 09/02/2023    ALKPHOS 58 09/02/2023    BILITOTAL 0.4 09/02/2023     OTHER:   Lab Results   Component Value Date    LUCIA 9.5 10/28/2023    MAG 2.2 10/28/2023       Anesthesia Plan    ASA Status:  3    NPO Status:  NPO Appropriate    Anesthesia Type: General.     - Airway: ETT   Induction: Intravenous, Propofol.   Maintenance: Balanced.   Techniques and Equipment:     - Lines/Monitors: 2nd IV, BIS     Consents    Anesthesia Plan(s) and associated risks, benefits, and realistic alternatives discussed. Questions answered and patient/representative(s) expressed understanding.     - Discussed:     - Discussed with:  Patient      - Extended Intubation/Ventilatory Support Discussed: No.      - Patient is DNR/DNI Status: No     Use of blood products discussed: Yes.     - Discussed with: Patient.     - Consented: consented to blood products     Postoperative Care    Pain management: IV analgesics, Oral pain medications, Multi-modal analgesia.   PONV prophylaxis: Ondansetron (or other 5HT-3), Dexamethasone or Solumedrol     Comments:               DO PIERRE Slater have reviewed the pertinent notes and labs in the chart from the past 30 days and (re)examined the patient.  Any updates or changes from those notes are reflected in this note.                         # Overweight: Estimated body mass index is 25.52 kg/m  as calculated from the following:    Height as of 12/18/24: 1.829 " m (6').    Weight as of 12/18/24: 85.4 kg (188 lb 3.2 oz).

## 2024-12-23 ENCOUNTER — APPOINTMENT (OUTPATIENT)
Dept: GENERAL RADIOLOGY | Facility: CLINIC | Age: 54
DRG: 164 | End: 2024-12-23
Attending: THORACIC SURGERY (CARDIOTHORACIC VASCULAR SURGERY)
Payer: COMMERCIAL

## 2024-12-23 ENCOUNTER — OFFICE VISIT (OUTPATIENT)
Dept: INTERPRETER SERVICES | Facility: CLINIC | Age: 54
End: 2024-12-23

## 2024-12-23 ENCOUNTER — ANESTHESIA (OUTPATIENT)
Dept: SURGERY | Facility: CLINIC | Age: 54
End: 2024-12-23
Payer: COMMERCIAL

## 2024-12-23 ENCOUNTER — HOSPITAL ENCOUNTER (INPATIENT)
Facility: CLINIC | Age: 54
End: 2024-12-23
Attending: THORACIC SURGERY (CARDIOTHORACIC VASCULAR SURGERY) | Admitting: THORACIC SURGERY (CARDIOTHORACIC VASCULAR SURGERY)
Payer: COMMERCIAL

## 2024-12-23 ENCOUNTER — OFFICE VISIT (OUTPATIENT)
Dept: INTERPRETER SERVICES | Facility: CLINIC | Age: 54
End: 2024-12-23
Attending: THORACIC SURGERY (CARDIOTHORACIC VASCULAR SURGERY)
Payer: COMMERCIAL

## 2024-12-23 DIAGNOSIS — Z98.890 STATUS POST PLICATION OF DIAPHRAGM: Primary | ICD-10-CM

## 2024-12-23 PROBLEM — J98.6 DIAPHRAGM PARALYSIS: Status: ACTIVE | Noted: 2024-12-23

## 2024-12-23 LAB
ABO + RH BLD: NORMAL
BLD GP AB SCN SERPL QL: NEGATIVE
GLUCOSE BLDC GLUCOMTR-MCNC: 112 MG/DL (ref 70–99)
GLUCOSE BLDC GLUCOMTR-MCNC: 130 MG/DL (ref 70–99)
SPECIMEN EXP DATE BLD: NORMAL

## 2024-12-23 PROCEDURE — 71045 X-RAY EXAM CHEST 1 VIEW: CPT

## 2024-12-23 PROCEDURE — 86850 RBC ANTIBODY SCREEN: CPT | Performed by: THORACIC SURGERY (CARDIOTHORACIC VASCULAR SURGERY)

## 2024-12-23 PROCEDURE — 250N000013 HC RX MED GY IP 250 OP 250 PS 637: Performed by: STUDENT IN AN ORGANIZED HEALTH CARE EDUCATION/TRAINING PROGRAM

## 2024-12-23 PROCEDURE — 999N000141 HC STATISTIC PRE-PROCEDURE NURSING ASSESSMENT: Performed by: THORACIC SURGERY (CARDIOTHORACIC VASCULAR SURGERY)

## 2024-12-23 PROCEDURE — 250N000013 HC RX MED GY IP 250 OP 250 PS 637: Performed by: THORACIC SURGERY (CARDIOTHORACIC VASCULAR SURGERY)

## 2024-12-23 PROCEDURE — 710N000010 HC RECOVERY PHASE 1, LEVEL 2, PER MIN: Performed by: THORACIC SURGERY (CARDIOTHORACIC VASCULAR SURGERY)

## 2024-12-23 PROCEDURE — 250N000011 HC RX IP 250 OP 636

## 2024-12-23 PROCEDURE — T1013 SIGN LANG/ORAL INTERPRETER: HCPCS | Mod: U3

## 2024-12-23 PROCEDURE — 258N000003 HC RX IP 258 OP 636

## 2024-12-23 PROCEDURE — 86900 BLOOD TYPING SEROLOGIC ABO: CPT | Performed by: THORACIC SURGERY (CARDIOTHORACIC VASCULAR SURGERY)

## 2024-12-23 PROCEDURE — 0BQT4ZZ REPAIR DIAPHRAGM, PERCUTANEOUS ENDOSCOPIC APPROACH: ICD-10-PCS | Performed by: THORACIC SURGERY (CARDIOTHORACIC VASCULAR SURGERY)

## 2024-12-23 PROCEDURE — 250N000011 HC RX IP 250 OP 636: Performed by: THORACIC SURGERY (CARDIOTHORACIC VASCULAR SURGERY)

## 2024-12-23 PROCEDURE — 272N000001 HC OR GENERAL SUPPLY STERILE: Performed by: THORACIC SURGERY (CARDIOTHORACIC VASCULAR SURGERY)

## 2024-12-23 PROCEDURE — 250N000025 HC SEVOFLURANE, PER MIN: Performed by: THORACIC SURGERY (CARDIOTHORACIC VASCULAR SURGERY)

## 2024-12-23 PROCEDURE — 360N000077 HC SURGERY LEVEL 4, PER MIN: Performed by: THORACIC SURGERY (CARDIOTHORACIC VASCULAR SURGERY)

## 2024-12-23 PROCEDURE — 36415 COLL VENOUS BLD VENIPUNCTURE: CPT | Performed by: THORACIC SURGERY (CARDIOTHORACIC VASCULAR SURGERY)

## 2024-12-23 PROCEDURE — 250N000009 HC RX 250

## 2024-12-23 PROCEDURE — 71045 X-RAY EXAM CHEST 1 VIEW: CPT | Mod: 26 | Performed by: RADIOLOGY

## 2024-12-23 PROCEDURE — 250N000011 HC RX IP 250 OP 636: Performed by: STUDENT IN AN ORGANIZED HEALTH CARE EDUCATION/TRAINING PROGRAM

## 2024-12-23 PROCEDURE — 250N000009 HC RX 250: Performed by: THORACIC SURGERY (CARDIOTHORACIC VASCULAR SURGERY)

## 2024-12-23 PROCEDURE — 250N000013 HC RX MED GY IP 250 OP 250 PS 637

## 2024-12-23 PROCEDURE — 49329 UNLSTD LAPS PX ABD PERTM&OMN: CPT | Mod: GC | Performed by: THORACIC SURGERY (CARDIOTHORACIC VASCULAR SURGERY)

## 2024-12-23 PROCEDURE — 370N000017 HC ANESTHESIA TECHNICAL FEE, PER MIN: Performed by: THORACIC SURGERY (CARDIOTHORACIC VASCULAR SURGERY)

## 2024-12-23 PROCEDURE — 120N000003 HC R&B IMCU UMMC

## 2024-12-23 RX ORDER — ONDANSETRON 2 MG/ML
4 INJECTION INTRAMUSCULAR; INTRAVENOUS EVERY 6 HOURS PRN
Status: ACTIVE | OUTPATIENT
Start: 2024-12-23

## 2024-12-23 RX ORDER — NALOXONE HYDROCHLORIDE 0.4 MG/ML
0.4 INJECTION, SOLUTION INTRAMUSCULAR; INTRAVENOUS; SUBCUTANEOUS
Status: ACTIVE | OUTPATIENT
Start: 2024-12-23

## 2024-12-23 RX ORDER — AMOXICILLIN 250 MG
1 CAPSULE ORAL 2 TIMES DAILY PRN
Status: DISPENSED | OUTPATIENT
Start: 2024-12-23

## 2024-12-23 RX ORDER — CEFAZOLIN SODIUM/WATER 2 G/20 ML
2 SYRINGE (ML) INTRAVENOUS SEE ADMIN INSTRUCTIONS
Status: DISCONTINUED | OUTPATIENT
Start: 2024-12-23 | End: 2024-12-23 | Stop reason: HOSPADM

## 2024-12-23 RX ORDER — LIDOCAINE 40 MG/G
CREAM TOPICAL
Status: DISCONTINUED | OUTPATIENT
Start: 2024-12-23 | End: 2024-12-23 | Stop reason: HOSPADM

## 2024-12-23 RX ORDER — HYDROXYZINE HYDROCHLORIDE 25 MG/1
25 TABLET, FILM COATED ORAL ONCE
Status: COMPLETED | OUTPATIENT
Start: 2024-12-23 | End: 2024-12-23

## 2024-12-23 RX ORDER — AMOXICILLIN 250 MG
2 CAPSULE ORAL 2 TIMES DAILY PRN
Status: DISPENSED | OUTPATIENT
Start: 2024-12-23

## 2024-12-23 RX ORDER — POLYETHYLENE GLYCOL 3350 17 G/17G
17 POWDER, FOR SOLUTION ORAL DAILY
Status: DISPENSED | OUTPATIENT
Start: 2024-12-24

## 2024-12-23 RX ORDER — PROPOFOL 10 MG/ML
INJECTION, EMULSION INTRAVENOUS PRN
Status: DISCONTINUED | OUTPATIENT
Start: 2024-12-23 | End: 2024-12-23

## 2024-12-23 RX ORDER — HYDROMORPHONE HCL IN WATER/PF 6 MG/30 ML
.3-.5 PATIENT CONTROLLED ANALGESIA SYRINGE INTRAVENOUS
Status: DISPENSED | OUTPATIENT
Start: 2024-12-23

## 2024-12-23 RX ORDER — LIDOCAINE 40 MG/G
CREAM TOPICAL
Status: ACTIVE | OUTPATIENT
Start: 2024-12-23

## 2024-12-23 RX ORDER — PANTOPRAZOLE SODIUM 40 MG/1
40 TABLET, DELAYED RELEASE ORAL
Status: DISPENSED | OUTPATIENT
Start: 2024-12-24

## 2024-12-23 RX ORDER — METHOCARBAMOL 500 MG/1
1000 TABLET, FILM COATED ORAL 4 TIMES DAILY
Status: DISPENSED | OUTPATIENT
Start: 2024-12-23

## 2024-12-23 RX ORDER — NALOXONE HYDROCHLORIDE 0.4 MG/ML
0.2 INJECTION, SOLUTION INTRAMUSCULAR; INTRAVENOUS; SUBCUTANEOUS
Status: ACTIVE | OUTPATIENT
Start: 2024-12-23

## 2024-12-23 RX ORDER — ACETAMINOPHEN 500 MG
1000 TABLET ORAL 4 TIMES DAILY
Status: DISPENSED | OUTPATIENT
Start: 2024-12-23

## 2024-12-23 RX ORDER — OXYCODONE HYDROCHLORIDE 10 MG/1
10 TABLET ORAL EVERY 4 HOURS PRN
Status: DISPENSED | OUTPATIENT
Start: 2024-12-23

## 2024-12-23 RX ORDER — OXYCODONE HYDROCHLORIDE 5 MG/1
5 TABLET ORAL EVERY 4 HOURS PRN
Status: DISPENSED | OUTPATIENT
Start: 2024-12-23

## 2024-12-23 RX ORDER — ONDANSETRON 4 MG/1
4 TABLET, ORALLY DISINTEGRATING ORAL EVERY 6 HOURS PRN
Status: ACTIVE | OUTPATIENT
Start: 2024-12-23

## 2024-12-23 RX ORDER — ONDANSETRON 2 MG/ML
4 INJECTION INTRAMUSCULAR; INTRAVENOUS EVERY 30 MIN PRN
Status: DISCONTINUED | OUTPATIENT
Start: 2024-12-23 | End: 2024-12-23 | Stop reason: HOSPADM

## 2024-12-23 RX ORDER — METHOCARBAMOL 100 MG/ML
500 INJECTION, SOLUTION INTRAMUSCULAR; INTRAVENOUS ONCE
Status: COMPLETED | OUTPATIENT
Start: 2024-12-23 | End: 2024-12-23

## 2024-12-23 RX ORDER — OXYCODONE HYDROCHLORIDE 5 MG/1
5 TABLET ORAL EVERY 4 HOURS PRN
Status: DISCONTINUED | OUTPATIENT
Start: 2024-12-23 | End: 2024-12-23

## 2024-12-23 RX ORDER — CEFAZOLIN SODIUM/WATER 2 G/20 ML
2 SYRINGE (ML) INTRAVENOUS
Status: COMPLETED | OUTPATIENT
Start: 2024-12-23 | End: 2024-12-23

## 2024-12-23 RX ORDER — PANTOPRAZOLE SODIUM 40 MG/1
40 TABLET, DELAYED RELEASE ORAL
Status: DISCONTINUED | OUTPATIENT
Start: 2024-12-24 | End: 2024-12-23

## 2024-12-23 RX ORDER — NEOSTIGMINE METHYLSULFATE 1 MG/ML
VIAL (ML) INJECTION PRN
Status: DISCONTINUED | OUTPATIENT
Start: 2024-12-23 | End: 2024-12-23

## 2024-12-23 RX ORDER — GLYCOPYRROLATE 0.2 MG/ML
INJECTION, SOLUTION INTRAMUSCULAR; INTRAVENOUS PRN
Status: DISCONTINUED | OUTPATIENT
Start: 2024-12-23 | End: 2024-12-23

## 2024-12-23 RX ORDER — ONDANSETRON 2 MG/ML
INJECTION INTRAMUSCULAR; INTRAVENOUS PRN
Status: DISCONTINUED | OUTPATIENT
Start: 2024-12-23 | End: 2024-12-23

## 2024-12-23 RX ORDER — NALOXONE HYDROCHLORIDE 0.4 MG/ML
0.1 INJECTION, SOLUTION INTRAMUSCULAR; INTRAVENOUS; SUBCUTANEOUS
Status: DISCONTINUED | OUTPATIENT
Start: 2024-12-23 | End: 2024-12-23 | Stop reason: HOSPADM

## 2024-12-23 RX ORDER — ONDANSETRON 4 MG/1
4 TABLET, ORALLY DISINTEGRATING ORAL EVERY 30 MIN PRN
Status: DISCONTINUED | OUTPATIENT
Start: 2024-12-23 | End: 2024-12-23 | Stop reason: HOSPADM

## 2024-12-23 RX ORDER — DIPHENHYDRAMINE HYDROCHLORIDE 50 MG/ML
INJECTION INTRAMUSCULAR; INTRAVENOUS PRN
Status: DISCONTINUED | OUTPATIENT
Start: 2024-12-23 | End: 2024-12-23

## 2024-12-23 RX ORDER — ACETAMINOPHEN 325 MG/1
975 TABLET ORAL ONCE
Status: COMPLETED | OUTPATIENT
Start: 2024-12-23 | End: 2024-12-23

## 2024-12-23 RX ORDER — HYDROMORPHONE HCL IN WATER/PF 6 MG/30 ML
0.4 PATIENT CONTROLLED ANALGESIA SYRINGE INTRAVENOUS EVERY 5 MIN PRN
Status: DISCONTINUED | OUTPATIENT
Start: 2024-12-23 | End: 2024-12-23 | Stop reason: HOSPADM

## 2024-12-23 RX ORDER — HYDROXYZINE HYDROCHLORIDE 25 MG/1
50 TABLET, FILM COATED ORAL ONCE
Status: COMPLETED | OUTPATIENT
Start: 2024-12-23 | End: 2024-12-23

## 2024-12-23 RX ORDER — ROSUVASTATIN CALCIUM 20 MG/1
20 TABLET, COATED ORAL AT BEDTIME
Status: DISPENSED | OUTPATIENT
Start: 2024-12-23

## 2024-12-23 RX ORDER — BUPROPION HYDROCHLORIDE 150 MG/1
300 TABLET ORAL EVERY MORNING
Status: DISPENSED | OUTPATIENT
Start: 2024-12-24

## 2024-12-23 RX ORDER — PROCHLORPERAZINE MALEATE 5 MG/1
10 TABLET ORAL EVERY 6 HOURS PRN
Status: ACTIVE | OUTPATIENT
Start: 2024-12-23

## 2024-12-23 RX ORDER — DEXAMETHASONE SODIUM PHOSPHATE 4 MG/ML
INJECTION, SOLUTION INTRA-ARTICULAR; INTRALESIONAL; INTRAMUSCULAR; INTRAVENOUS; SOFT TISSUE PRN
Status: DISCONTINUED | OUTPATIENT
Start: 2024-12-23 | End: 2024-12-23

## 2024-12-23 RX ORDER — LIDOCAINE HYDROCHLORIDE 20 MG/ML
INJECTION, SOLUTION INFILTRATION; PERINEURAL PRN
Status: DISCONTINUED | OUTPATIENT
Start: 2024-12-23 | End: 2024-12-23

## 2024-12-23 RX ORDER — HYDROMORPHONE HCL IN WATER/PF 6 MG/30 ML
0.2 PATIENT CONTROLLED ANALGESIA SYRINGE INTRAVENOUS EVERY 5 MIN PRN
Status: DISCONTINUED | OUTPATIENT
Start: 2024-12-23 | End: 2024-12-23 | Stop reason: HOSPADM

## 2024-12-23 RX ORDER — ESMOLOL HYDROCHLORIDE 10 MG/ML
INJECTION INTRAVENOUS PRN
Status: DISCONTINUED | OUTPATIENT
Start: 2024-12-23 | End: 2024-12-23

## 2024-12-23 RX ORDER — SODIUM CHLORIDE, SODIUM LACTATE, POTASSIUM CHLORIDE, CALCIUM CHLORIDE 600; 310; 30; 20 MG/100ML; MG/100ML; MG/100ML; MG/100ML
INJECTION, SOLUTION INTRAVENOUS CONTINUOUS
Status: DISCONTINUED | OUTPATIENT
Start: 2024-12-23 | End: 2024-12-23 | Stop reason: HOSPADM

## 2024-12-23 RX ORDER — DEXAMETHASONE SODIUM PHOSPHATE 4 MG/ML
4 INJECTION, SOLUTION INTRA-ARTICULAR; INTRALESIONAL; INTRAMUSCULAR; INTRAVENOUS; SOFT TISSUE
Status: DISCONTINUED | OUTPATIENT
Start: 2024-12-23 | End: 2024-12-23 | Stop reason: HOSPADM

## 2024-12-23 RX ADMIN — DEXAMETHASONE SODIUM PHOSPHATE 8 MG: 4 INJECTION, SOLUTION INTRA-ARTICULAR; INTRALESIONAL; INTRAMUSCULAR; INTRAVENOUS; SOFT TISSUE at 07:49

## 2024-12-23 RX ADMIN — ROSUVASTATIN CALCIUM 20 MG: 20 TABLET, FILM COATED ORAL at 21:45

## 2024-12-23 RX ADMIN — PROPOFOL 40 MG: 10 INJECTION, EMULSION INTRAVENOUS at 11:12

## 2024-12-23 RX ADMIN — HYDROMORPHONE HYDROCHLORIDE 1 MG: 1 INJECTION, SOLUTION INTRAMUSCULAR; INTRAVENOUS; SUBCUTANEOUS at 11:58

## 2024-12-23 RX ADMIN — FAMOTIDINE 20 MG: 10 INJECTION, SOLUTION INTRAVENOUS at 09:01

## 2024-12-23 RX ADMIN — PHENYLEPHRINE HYDROCHLORIDE 150 MCG: 10 INJECTION INTRAVENOUS at 11:11

## 2024-12-23 RX ADMIN — LIDOCAINE HYDROCHLORIDE 100 MG: 20 INJECTION, SOLUTION INFILTRATION; PERINEURAL at 07:49

## 2024-12-23 RX ADMIN — NEOSTIGMINE METHYLSULFATE 3 MG: 1 INJECTION, SOLUTION INTRAVENOUS at 11:18

## 2024-12-23 RX ADMIN — PROPOFOL 130 MG: 10 INJECTION, EMULSION INTRAVENOUS at 07:38

## 2024-12-23 RX ADMIN — DIPHENHYDRAMINE HYDROCHLORIDE 50 MG: 50 INJECTION, SOLUTION INTRAMUSCULAR; INTRAVENOUS at 07:48

## 2024-12-23 RX ADMIN — HYDROMORPHONE HYDROCHLORIDE 0.5 MG: 0.2 INJECTION, SOLUTION INTRAMUSCULAR; INTRAVENOUS; SUBCUTANEOUS at 18:24

## 2024-12-23 RX ADMIN — HYDROXYZINE HYDROCHLORIDE 25 MG: 25 TABLET ORAL at 14:18

## 2024-12-23 RX ADMIN — ESMOLOL HYDROCHLORIDE 10 MG: 10 INJECTION, SOLUTION INTRAVENOUS at 08:48

## 2024-12-23 RX ADMIN — MIDAZOLAM 2 MG: 1 INJECTION INTRAMUSCULAR; INTRAVENOUS at 07:38

## 2024-12-23 RX ADMIN — HYDROMORPHONE HYDROCHLORIDE 0.4 MG: 0.2 INJECTION, SOLUTION INTRAMUSCULAR; INTRAVENOUS; SUBCUTANEOUS at 12:31

## 2024-12-23 RX ADMIN — HYDROMORPHONE HYDROCHLORIDE 0.2 MG: 0.2 INJECTION, SOLUTION INTRAMUSCULAR; INTRAVENOUS; SUBCUTANEOUS at 12:26

## 2024-12-23 RX ADMIN — OXYCODONE HYDROCHLORIDE 10 MG: 10 TABLET ORAL at 16:05

## 2024-12-23 RX ADMIN — HYDROMORPHONE HYDROCHLORIDE 0.4 MG: 0.2 INJECTION, SOLUTION INTRAMUSCULAR; INTRAVENOUS; SUBCUTANEOUS at 12:08

## 2024-12-23 RX ADMIN — HYDROMORPHONE HYDROCHLORIDE 0.4 MG: 0.2 INJECTION, SOLUTION INTRAMUSCULAR; INTRAVENOUS; SUBCUTANEOUS at 13:07

## 2024-12-23 RX ADMIN — OXYCODONE HYDROCHLORIDE 5 MG: 5 TABLET ORAL at 20:26

## 2024-12-23 RX ADMIN — ACETAMINOPHEN 975 MG: 325 TABLET, FILM COATED ORAL at 06:11

## 2024-12-23 RX ADMIN — ACETAMINOPHEN 1000 MG: 500 TABLET ORAL at 20:30

## 2024-12-23 RX ADMIN — Medication 50 MG: at 07:38

## 2024-12-23 RX ADMIN — DEXMEDETOMIDINE HYDROCHLORIDE 16 MCG: 100 INJECTION, SOLUTION INTRAVENOUS at 11:58

## 2024-12-23 RX ADMIN — HYDROMORPHONE HYDROCHLORIDE 0.5 MG: 1 INJECTION, SOLUTION INTRAMUSCULAR; INTRAVENOUS; SUBCUTANEOUS at 09:29

## 2024-12-23 RX ADMIN — Medication 2 G: at 07:49

## 2024-12-23 RX ADMIN — CISATRACURIUM BESYLATE 2 MG: 2 INJECTION INTRAVENOUS at 10:00

## 2024-12-23 RX ADMIN — GLYCOPYRROLATE 0.4 MG: 0.2 INJECTION, SOLUTION INTRAMUSCULAR; INTRAVENOUS at 11:18

## 2024-12-23 RX ADMIN — METHOCARBAMOL 500 MG: 1000 INJECTION, SOLUTION INTRAMUSCULAR; INTRAVENOUS at 15:43

## 2024-12-23 RX ADMIN — METHOCARBAMOL 500 MG: 1000 INJECTION, SOLUTION INTRAMUSCULAR; INTRAVENOUS at 14:09

## 2024-12-23 RX ADMIN — ACETAMINOPHEN 1000 MG: 500 TABLET ORAL at 14:59

## 2024-12-23 RX ADMIN — Medication 20 MG: at 13:39

## 2024-12-23 RX ADMIN — HYDROMORPHONE HYDROCHLORIDE 0.5 MG: 1 INJECTION, SOLUTION INTRAMUSCULAR; INTRAVENOUS; SUBCUTANEOUS at 08:35

## 2024-12-23 RX ADMIN — METHOCARBAMOL TABLETS 1000 MG: 500 TABLET, COATED ORAL at 20:30

## 2024-12-23 RX ADMIN — ONDANSETRON 4 MG: 2 INJECTION INTRAMUSCULAR; INTRAVENOUS at 11:11

## 2024-12-23 RX ADMIN — SODIUM CHLORIDE, POTASSIUM CHLORIDE, SODIUM LACTATE AND CALCIUM CHLORIDE: 600; 310; 30; 20 INJECTION, SOLUTION INTRAVENOUS at 07:30

## 2024-12-23 RX ADMIN — CISATRACURIUM BESYLATE 6 MG: 2 INJECTION INTRAVENOUS at 08:27

## 2024-12-23 RX ADMIN — ESMOLOL HYDROCHLORIDE 10 MG: 10 INJECTION, SOLUTION INTRAVENOUS at 09:17

## 2024-12-23 RX ADMIN — HYDROMORPHONE HYDROCHLORIDE 0.4 MG: 0.2 INJECTION, SOLUTION INTRAMUSCULAR; INTRAVENOUS; SUBCUTANEOUS at 12:18

## 2024-12-23 ASSESSMENT — ACTIVITIES OF DAILY LIVING (ADL)
ADLS_ACUITY_SCORE: 36
ADLS_ACUITY_SCORE: 37
ADLS_ACUITY_SCORE: 36
ADLS_ACUITY_SCORE: 37

## 2024-12-23 NOTE — OP NOTE
OPERATIVE REPORT    PREOPERATIVE DIAGNOSES:  1.  LEFT diaphragm paralysis.     POSTOPERATIVE DIAGNOSES:  1.  Same     OPERATION PERFORMED:   Laparoscopic LEFT diaphragm plication.     SURGEON:  Lesia Miller MD    ASSISTANT: David Cervantes MD     ANESTHESIA:  General endotracheal anesthesia.    ESTIMATED BLOOD LOSS:  30 mL.    COMPLICATIONS:  None.    DRAINS: 19 Fr osbaldo drain to LEFT pleural space.     SPECIMEN: None    OPERATIVE FINDINGS: Elevated LEFT diaphragm.     DESCRIPTION OF OPERATION:  The patient was transported to the operating room and placed supine. Following induction of general endotracheal anesthesia, the abdomen and chest  were prepped and draped in sterile fashion. We performed a time out confirming the name of the patient and correct procedure. He had SCDs functioning prior to induction and received antibiotics within 30 min of incision.    We placed  two 12 mm ports to the left of the midline and two 12 mm ports in the left mid abdomen. We entered the abdomen with an open Latisha technique. We made a small incision in the diaphragm to equalize the pressure between the abdomen and the chest.     We placed one horizontal stitch on the most posteroinferior part of the diaphragm, and 4 anterior stitches. We used #2 tycron with full pledgets.     Next we placed a 5 mm port in the left chest and introduced a camera. The diaphragm appeared to be taut, and we were satisfied with the result. We then left a 19 Fr osbaldo drain in the pleural space and re insufflated the abdomen. All ports were closed with 2-0 vicryl.     Skin incisions were closed with 3-0 vicryl and steri strips applied. The patient tolerated the procedure well, was extubated in the OR and transferred to PACU. All instruments and sponge counts were correct.     Lesia Miller MD

## 2024-12-23 NOTE — ANESTHESIA PROCEDURE NOTES
Airway       Patient location during procedure: OR       Procedure Start/Stop Times: 12/23/2024 7:43 AM  Staff -        Anesthesiologist:  Nurys Childs MD       Resident/Fellow: Safia Gabriel DO       Performed By: residentIndications and Patient Condition       Indications for airway management: jigna-procedural       Induction type:intravenous       Mask difficulty assessment: 1 - vent by mask    Final Airway Details       Final airway type: endotracheal airway       Successful airway: ETT - single and Oral  Endotracheal Airway Details        ETT size (mm): 7.5       Cuffed: yes       Successful intubation technique: direct laryngoscopy       DL Blade Type: MAC 4       Grade View of Cords: 1       Adjucts: stylet       Position: Right       Measured from: lips       Secured at (cm): 23       Bite block used: None    Post intubation assessment        Placement verified by: capnometry, equal breath sounds and chest rise        Number of attempts at approach: 2       Number of other approaches attempted: 0       Secured with: tape       Ease of procedure: easy       Dentition: Intact and Unchanged    Medication(s) Administered   Medication Administration Time: 12/23/2024 7:43 AM

## 2024-12-23 NOTE — OR NURSING
Thoracic surgery team paged regarding completion of chest xray and feeling of pressure in the chest. Awaiting response.    Isabell from Thoracic Surgery team bedside to assess patient. Chest xray looks okay for patient to proceed to inpatient care unit. Plan to give patient Robaxin when able and continue to monitor patient.

## 2024-12-23 NOTE — PLAN OF CARE
Admission          12/23/2024  5:08 AM  -----------------------------------------------------------  Reason for admission:  Primary team notified of pt arrival.  Admitted from: PACU  Via: stretcher  Accompanied by: family  Belongings: Placed in closet; valuables sent home with family  Admission Profile: complete  Teaching: orientation to unit and call light- call light within reach, call don't fall, use of console, meal times, when to call for the RN, and enforced importance of safety   Access: PIV  Ht./Wt.: complete  Code Status verified on armband: yes  2 RN Skin Assessment Completed By: Suzie Michel  Med Rec completed: yes  Suction/Ambu bag/Flowmeter at bedside: yes  Is patient having diarrhea upon admission- if YES fill out testing algorithm : no    C. Diff Testing Algorithm (MUST be marked YES)   3 or more loose stools in 24 hrs. [] Yes [] No       Additional symptoms:(At least ONE must be marked yes)   Abdominal pain/discomfort [] Yes [] No   Fever at least 38C (100.4 F) [] Yes [] No   Elevated WBC(>11,000) [] Yes [] No       Exclusion Criteria:  (MUST be marked YES)   Off laxatives for at least 48 hrs. [] Yes [] No       Pt status:    Temp:  [97.5  F (36.4  C)-98.7  F (37.1  C)] 97.5  F (36.4  C)  Pulse:  [] 107  Resp:  [11-25] 25  BP: (126-167)/() 167/122  SpO2:  [86 %-97 %] 95 %

## 2024-12-23 NOTE — OR NURSING
Pt uses bilat hearing aides, pt states he will need a sign language interpretor while in recovery, states he uses sign language whenever in recovery. Sign language interpretor present in preop, pt spouse uses sign language

## 2024-12-23 NOTE — BRIEF OP NOTE
[facility]    Brief Operative Note    Pre-operative diagnosis: Diaphragm paralysis [J98.6]  Post-operative diagnosis Same as pre-operative diagnosis    Procedure: PLICATION, DIAPHRAGM, LAPAROSCOPIC, Left - Abdomen    Surgeon: Surgeons and Role:     * Lesia Mora MD - Primary     * Karishma Cervantes MD - Resident - Assisting  Anesthesia: General   Estimated Blood Loss: Minimal    Drains: 19 Fr osbaldo drain in left chest to pleurovac.   Specimens: * No specimens in log *  Findings:   Marked elevation of left diaphragm. Plicated with multiple pledgeted ethibond sutures. .  Complications: None.  Implants: * No implants in log *

## 2024-12-23 NOTE — ANESTHESIA CARE TRANSFER NOTE
Patient: Enmanuel Randall    Procedure: Procedure(s):  PLICATION, DIAPHRAGM, LAPAROSCOPIC       Diagnosis: Diaphragm paralysis [J98.6]  Diagnosis Additional Information: No value filed.    Anesthesia Type:   General     Note:    Oropharynx: oropharynx clear of all foreign objects and spontaneously breathing  Level of Consciousness: drowsy  Oxygen Supplementation: face mask  Level of Supplemental Oxygen (L/min / FiO2): 8  Independent Airway: airway patency satisfactory and stable  Dentition: dentition unchanged  Vital Signs Stable: post-procedure vital signs reviewed and stable  Report to RN Given: handoff report given  Patient transferred to: PACU    Handoff Report: Identifed the Patient, Identified the Reponsible Provider, Reviewed the pertinent medical history, Discussed the surgical course, Reviewed Intra-OP anesthesia mangement and issues during anesthesia, Set expectations for post-procedure period and Allowed opportunity for questions and acknowledgement of understanding    Vitals:  Vitals Value Taken Time   /104 12/23/24 1200   Temp     Pulse 78 12/23/24 1201   Resp 30 12/23/24 1201   SpO2 96 % 12/23/24 1201   Vitals shown include unfiled device data.    Electronically Signed By: Safia Gabriel DO  December 23, 2024  12:01 PM

## 2024-12-23 NOTE — ANESTHESIA POSTPROCEDURE EVALUATION
Patient: Enmanuel Randall    Procedure: Procedure(s):  PLICATION, DIAPHRAGM, LAPAROSCOPIC       Anesthesia Type:  General    Note:  Disposition: Inpatient   Postop Pain Control: Challenging            Challenges/Interventions: Acute Pain            Sign Out: ONGOING pain issues   PONV: No   Neuro/Psych: Uneventful            Sign Out: Acceptable/Baseline neuro status   Airway/Respiratory: Uneventful            Sign Out: Acceptable/Baseline resp. status   CV/Hemodynamics: Uneventful            Sign Out: Acceptable CV status; No obvious hypovolemia; No obvious fluid overload   Other NRE: NONE   DID A NON-ROUTINE EVENT OCCUR?            Last vitals:  Vitals Value Taken Time   /99 12/23/24 1250   Temp     Pulse 82 12/23/24 1255   Resp 19 12/23/24 1255   SpO2 97 % 12/23/24 1255   Vitals shown include unfiled device data.    Electronically Signed By: Agnieszka Ricks MD  December 23, 2024  12:56 PM

## 2024-12-24 ENCOUNTER — APPOINTMENT (OUTPATIENT)
Dept: GENERAL RADIOLOGY | Facility: CLINIC | Age: 54
End: 2024-12-24
Attending: THORACIC SURGERY (CARDIOTHORACIC VASCULAR SURGERY)
Payer: COMMERCIAL

## 2024-12-24 LAB
ANION GAP SERPL CALCULATED.3IONS-SCNC: 11 MMOL/L (ref 7–15)
BUN SERPL-MCNC: 13.7 MG/DL (ref 6–20)
CALCIUM SERPL-MCNC: 8.9 MG/DL (ref 8.8–10.4)
CHLORIDE SERPL-SCNC: 102 MMOL/L (ref 98–107)
CREAT SERPL-MCNC: 1.06 MG/DL (ref 0.67–1.17)
EGFRCR SERPLBLD CKD-EPI 2021: 83 ML/MIN/1.73M2
ERYTHROCYTE [DISTWIDTH] IN BLOOD BY AUTOMATED COUNT: 12.6 % (ref 10–15)
GLUCOSE BLDC GLUCOMTR-MCNC: 120 MG/DL (ref 70–99)
GLUCOSE SERPL-MCNC: 115 MG/DL (ref 70–99)
HCO3 SERPL-SCNC: 24 MMOL/L (ref 22–29)
HCT VFR BLD AUTO: 42.8 % (ref 40–53)
HGB BLD-MCNC: 14.3 G/DL (ref 13.3–17.7)
MAGNESIUM SERPL-MCNC: 2 MG/DL (ref 1.7–2.3)
MCH RBC QN AUTO: 28.8 PG (ref 26.5–33)
MCHC RBC AUTO-ENTMCNC: 33.4 G/DL (ref 31.5–36.5)
MCV RBC AUTO: 86 FL (ref 78–100)
PLATELET # BLD AUTO: 214 10E3/UL (ref 150–450)
POTASSIUM SERPL-SCNC: 4.2 MMOL/L (ref 3.4–5.3)
RBC # BLD AUTO: 4.97 10E6/UL (ref 4.4–5.9)
SODIUM SERPL-SCNC: 137 MMOL/L (ref 135–145)
WBC # BLD AUTO: 11.9 10E3/UL (ref 4–11)

## 2024-12-24 PROCEDURE — 250N000013 HC RX MED GY IP 250 OP 250 PS 637

## 2024-12-24 PROCEDURE — 71045 X-RAY EXAM CHEST 1 VIEW: CPT | Mod: 26 | Performed by: RADIOLOGY

## 2024-12-24 PROCEDURE — 36415 COLL VENOUS BLD VENIPUNCTURE: CPT | Performed by: THORACIC SURGERY (CARDIOTHORACIC VASCULAR SURGERY)

## 2024-12-24 PROCEDURE — 120N000003 HC R&B IMCU UMMC

## 2024-12-24 PROCEDURE — 80048 BASIC METABOLIC PNL TOTAL CA: CPT | Performed by: STUDENT IN AN ORGANIZED HEALTH CARE EDUCATION/TRAINING PROGRAM

## 2024-12-24 PROCEDURE — 82310 ASSAY OF CALCIUM: CPT | Performed by: STUDENT IN AN ORGANIZED HEALTH CARE EDUCATION/TRAINING PROGRAM

## 2024-12-24 PROCEDURE — 250N000013 HC RX MED GY IP 250 OP 250 PS 637: Performed by: STUDENT IN AN ORGANIZED HEALTH CARE EDUCATION/TRAINING PROGRAM

## 2024-12-24 PROCEDURE — 83735 ASSAY OF MAGNESIUM: CPT | Performed by: THORACIC SURGERY (CARDIOTHORACIC VASCULAR SURGERY)

## 2024-12-24 PROCEDURE — 999N000128 HC STATISTIC PERIPHERAL IV START W/O US GUIDANCE

## 2024-12-24 PROCEDURE — 36415 COLL VENOUS BLD VENIPUNCTURE: CPT | Performed by: STUDENT IN AN ORGANIZED HEALTH CARE EDUCATION/TRAINING PROGRAM

## 2024-12-24 PROCEDURE — 71045 X-RAY EXAM CHEST 1 VIEW: CPT

## 2024-12-24 PROCEDURE — 250N000011 HC RX IP 250 OP 636: Performed by: STUDENT IN AN ORGANIZED HEALTH CARE EDUCATION/TRAINING PROGRAM

## 2024-12-24 PROCEDURE — 85018 HEMOGLOBIN: CPT | Performed by: STUDENT IN AN ORGANIZED HEALTH CARE EDUCATION/TRAINING PROGRAM

## 2024-12-24 RX ORDER — CALCIUM CARBONATE 500 MG/1
500 TABLET, CHEWABLE ORAL ONCE
Status: COMPLETED | OUTPATIENT
Start: 2024-12-24 | End: 2024-12-24

## 2024-12-24 RX ORDER — CALCIUM CARBONATE 500 MG/1
500-1000 TABLET, CHEWABLE ORAL DAILY PRN
Status: DISPENSED | OUTPATIENT
Start: 2024-12-24

## 2024-12-24 RX ADMIN — BUPROPION HYDROCHLORIDE 300 MG: 150 TABLET, EXTENDED RELEASE ORAL at 08:52

## 2024-12-24 RX ADMIN — ACETAMINOPHEN 1000 MG: 500 TABLET ORAL at 08:51

## 2024-12-24 RX ADMIN — ANTACID TABLETS 1000 MG: 500 TABLET, CHEWABLE ORAL at 17:06

## 2024-12-24 RX ADMIN — POLYETHYLENE GLYCOL 3350 17 G: 17 POWDER, FOR SOLUTION ORAL at 08:52

## 2024-12-24 RX ADMIN — METHOCARBAMOL TABLETS 1000 MG: 500 TABLET, COATED ORAL at 08:51

## 2024-12-24 RX ADMIN — METHOCARBAMOL TABLETS 1000 MG: 500 TABLET, COATED ORAL at 15:25

## 2024-12-24 RX ADMIN — HYDROMORPHONE HYDROCHLORIDE 0.5 MG: 0.2 INJECTION, SOLUTION INTRAMUSCULAR; INTRAVENOUS; SUBCUTANEOUS at 13:08

## 2024-12-24 RX ADMIN — HYDROMORPHONE HYDROCHLORIDE 0.5 MG: 0.2 INJECTION, SOLUTION INTRAMUSCULAR; INTRAVENOUS; SUBCUTANEOUS at 09:55

## 2024-12-24 RX ADMIN — ROSUVASTATIN CALCIUM 20 MG: 20 TABLET, FILM COATED ORAL at 21:22

## 2024-12-24 RX ADMIN — OXYCODONE HYDROCHLORIDE 10 MG: 10 TABLET ORAL at 17:06

## 2024-12-24 RX ADMIN — SENNOSIDES AND DOCUSATE SODIUM 2 TABLET: 50; 8.6 TABLET ORAL at 15:33

## 2024-12-24 RX ADMIN — ACETAMINOPHEN 1000 MG: 500 TABLET ORAL at 15:25

## 2024-12-24 RX ADMIN — SENNOSIDES AND DOCUSATE SODIUM 1 TABLET: 50; 8.6 TABLET ORAL at 21:22

## 2024-12-24 RX ADMIN — HYDROMORPHONE HYDROCHLORIDE 0.5 MG: 0.2 INJECTION, SOLUTION INTRAMUSCULAR; INTRAVENOUS; SUBCUTANEOUS at 23:11

## 2024-12-24 RX ADMIN — HYDROMORPHONE HYDROCHLORIDE 0.5 MG: 0.2 INJECTION, SOLUTION INTRAMUSCULAR; INTRAVENOUS; SUBCUTANEOUS at 19:53

## 2024-12-24 RX ADMIN — OXYCODONE HYDROCHLORIDE 10 MG: 10 TABLET ORAL at 12:52

## 2024-12-24 RX ADMIN — HYDROMORPHONE HYDROCHLORIDE: 0.2 INJECTION, SOLUTION INTRAMUSCULAR; INTRAVENOUS; SUBCUTANEOUS at 16:41

## 2024-12-24 RX ADMIN — HYDROMORPHONE HYDROCHLORIDE 0.5 MG: 0.2 INJECTION, SOLUTION INTRAMUSCULAR; INTRAVENOUS; SUBCUTANEOUS at 01:19

## 2024-12-24 RX ADMIN — OXYCODONE HYDROCHLORIDE 10 MG: 10 TABLET ORAL at 21:21

## 2024-12-24 RX ADMIN — METHOCARBAMOL TABLETS 1000 MG: 500 TABLET, COATED ORAL at 19:54

## 2024-12-24 RX ADMIN — OXYCODONE HYDROCHLORIDE 10 MG: 10 TABLET ORAL at 08:52

## 2024-12-24 RX ADMIN — ACETAMINOPHEN 1000 MG: 500 TABLET ORAL at 12:52

## 2024-12-24 RX ADMIN — PANTOPRAZOLE SODIUM 40 MG: 40 TABLET, DELAYED RELEASE ORAL at 08:51

## 2024-12-24 RX ADMIN — ANTACID TABLETS 500 MG: 500 TABLET, CHEWABLE ORAL at 23:11

## 2024-12-24 RX ADMIN — HYDROMORPHONE HYDROCHLORIDE 0.5 MG: 0.2 INJECTION, SOLUTION INTRAMUSCULAR; INTRAVENOUS; SUBCUTANEOUS at 06:34

## 2024-12-24 RX ADMIN — METHOCARBAMOL TABLETS 1000 MG: 500 TABLET, COATED ORAL at 12:52

## 2024-12-24 RX ADMIN — OXYCODONE HYDROCHLORIDE 10 MG: 10 TABLET ORAL at 04:34

## 2024-12-24 ASSESSMENT — ACTIVITIES OF DAILY LIVING (ADL)
ADLS_ACUITY_SCORE: 36
ADLS_ACUITY_SCORE: 37
ADLS_ACUITY_SCORE: 36
ADLS_ACUITY_SCORE: 37
ADLS_ACUITY_SCORE: 37
ADLS_ACUITY_SCORE: 36
ADLS_ACUITY_SCORE: 37
ADLS_ACUITY_SCORE: 36
ADLS_ACUITY_SCORE: 37
ADLS_ACUITY_SCORE: 37
ADLS_ACUITY_SCORE: 36
ADLS_ACUITY_SCORE: 37
ADLS_ACUITY_SCORE: 37
ADLS_ACUITY_SCORE: 36
ADLS_ACUITY_SCORE: 37
ADLS_ACUITY_SCORE: 37

## 2024-12-24 NOTE — PROGRESS NOTES
S: Pain control improved overnight. He has been ambulatory and has been tolerating a diet. CT remains to water seal with subsequent CXR not demonstrating PTX.     O: BP (!) 151/105 (BP Location: Left arm)   Pulse 101   Temp 98.6  F (37  C) (Oral)   Resp 20   Ht 1.829 m (6')   Wt 83 kg (182 lb 15.7 oz)   SpO2 91%   BMI 24.82 kg/m    GENERAL: resting comfortably in bed, NAD  PULM: no increased WOB, CT w/o air leak   ABD: non-distended, incisions covered with dressings, appropriately tender, soft.     A/P: 54 year old male who underwent lap left diaphragmatic plication on 12/23 with Dr. Durán.     He is recovering well post-operatively.     - multimodal pain control   - chest tube to water seal; daily CXR; IS 10x/hr, Oob 4x/day    - reg diet   - likely discharge tomorrow    Seen and discussed with staff.     Karishma Cervantes MD  General Surgery Resident  [phone]

## 2024-12-24 NOTE — PROGRESS NOTES
STAFF ADDENDUM:  I saw and evaluated Mr. Randall and agree with the resident s findings and plan of care      COntinue CT  Plan for possible dischareg tmrw        Henrietta Penn MD

## 2024-12-24 NOTE — PLAN OF CARE
Neuro: A&Ox4. Hard of hearing, uses ASL to communicate  Cardiac: VSS.   Respiratory: Sating >95% on 3L o2 via NC.  GI/: Adequate urine output. No BM during the shift  Diet/appetite: Tolerating regular diet. Eating well.  Activity:  SBA up to the bathroom  Pain: At acceptable level on current regimen.   Skin: No new deficits noted.  LDA's: L, R PIV L chest tube to water seal    Plan: Continue with POC. Notify primary team with changes.     Goal Outcome Evaluation:                 Outcome Evaluation: left back pain is scored is 6-9/10  PRN dilaudid and  oxycodone given. VSS.

## 2024-12-24 NOTE — PHARMACY-ADMISSION MEDICATION HISTORY
Pharmacist Admission Medication History    Admission medication history is complete. The information provided in this note is only as accurate as the sources available at the time of the update.    Information Source(s): Mike/Oscar and RN med history for last doses  via N/A    Pertinent Information: None    Changes made to PTA medication list:  Added: None  Deleted: None  Changed: None    Allergies reviewed with patient and updates made in EHR: no    Medication History Completed By: Ariel Grubbs [facility] 12/24/2024 11:04 AM    PTA Med List   Medication Sig Last Dose/Taking    buPROPion (WELLBUTRIN XL) 150 MG 24 hr tablet Take 150 mg by mouth every morning. 12/22/2024 Evening    loratadine (CLARITIN) 10 MG tablet Take 10 mg by mouth daily as needed. More than a month    omeprazole (PRILOSEC) 40 MG DR capsule Take 1 capsule by mouth every morning. 12/22/2024 Evening    rosuvastatin (CRESTOR) 20 MG tablet Take 20 mg by mouth at bedtime. 12/22/2024 Evening    sildenafil (VIAGRA) 100 MG tablet Take 100 mg by mouth daily as needed. 12/20/2024    Turmeric (QC TUMERIC COMPLEX PO) Take 2 tablets by mouth daily. 12/9/2024    Vitamin D3 (VITAMIN D, CHOLECALCIFEROL,) 25 mcg (1000 units) tablet Take 25 mcg by mouth daily. 12/9/2024

## 2024-12-25 ENCOUNTER — APPOINTMENT (OUTPATIENT)
Dept: GENERAL RADIOLOGY | Facility: CLINIC | Age: 54
DRG: 164 | End: 2024-12-25
Attending: THORACIC SURGERY (CARDIOTHORACIC VASCULAR SURGERY)
Payer: COMMERCIAL

## 2024-12-25 ENCOUNTER — APPOINTMENT (OUTPATIENT)
Dept: GENERAL RADIOLOGY | Facility: CLINIC | Age: 54
DRG: 164 | End: 2024-12-25
Payer: COMMERCIAL

## 2024-12-25 LAB
GLUCOSE BLDC GLUCOMTR-MCNC: 125 MG/DL (ref 70–99)
GLUCOSE BLDC GLUCOMTR-MCNC: 126 MG/DL (ref 70–99)
HOLD SPECIMEN: NORMAL
MAGNESIUM SERPL-MCNC: 1.9 MG/DL (ref 1.7–2.3)
POTASSIUM SERPL-SCNC: 4.3 MMOL/L (ref 3.4–5.3)

## 2024-12-25 PROCEDURE — 250N000011 HC RX IP 250 OP 636: Performed by: STUDENT IN AN ORGANIZED HEALTH CARE EDUCATION/TRAINING PROGRAM

## 2024-12-25 PROCEDURE — 83735 ASSAY OF MAGNESIUM: CPT | Performed by: THORACIC SURGERY (CARDIOTHORACIC VASCULAR SURGERY)

## 2024-12-25 PROCEDURE — 250N000013 HC RX MED GY IP 250 OP 250 PS 637: Performed by: STUDENT IN AN ORGANIZED HEALTH CARE EDUCATION/TRAINING PROGRAM

## 2024-12-25 PROCEDURE — 71045 X-RAY EXAM CHEST 1 VIEW: CPT | Mod: 26 | Performed by: RADIOLOGY

## 2024-12-25 PROCEDURE — 74018 RADEX ABDOMEN 1 VIEW: CPT | Mod: 26 | Performed by: RADIOLOGY

## 2024-12-25 PROCEDURE — 84132 ASSAY OF SERUM POTASSIUM: CPT | Performed by: THORACIC SURGERY (CARDIOTHORACIC VASCULAR SURGERY)

## 2024-12-25 PROCEDURE — 36415 COLL VENOUS BLD VENIPUNCTURE: CPT | Performed by: THORACIC SURGERY (CARDIOTHORACIC VASCULAR SURGERY)

## 2024-12-25 PROCEDURE — 71045 X-RAY EXAM CHEST 1 VIEW: CPT

## 2024-12-25 PROCEDURE — 250N000013 HC RX MED GY IP 250 OP 250 PS 637

## 2024-12-25 PROCEDURE — 71045 X-RAY EXAM CHEST 1 VIEW: CPT | Mod: 76

## 2024-12-25 PROCEDURE — 74018 RADEX ABDOMEN 1 VIEW: CPT

## 2024-12-25 PROCEDURE — 120N000003 HC R&B IMCU UMMC

## 2024-12-25 RX ORDER — LIDOCAINE 4 G/G
1 PATCH TOPICAL
Status: DISPENSED | OUTPATIENT
Start: 2024-12-25

## 2024-12-25 RX ORDER — KETOROLAC TROMETHAMINE 30 MG/ML
15 INJECTION, SOLUTION INTRAMUSCULAR; INTRAVENOUS EVERY 6 HOURS
Status: DISCONTINUED | OUTPATIENT
Start: 2024-12-25 | End: 2024-12-25

## 2024-12-25 RX ORDER — BISACODYL 10 MG
10 SUPPOSITORY, RECTAL RECTAL ONCE
Status: COMPLETED | OUTPATIENT
Start: 2024-12-25 | End: 2024-12-25

## 2024-12-25 RX ADMIN — OXYCODONE HYDROCHLORIDE 10 MG: 10 TABLET ORAL at 15:23

## 2024-12-25 RX ADMIN — ROSUVASTATIN CALCIUM 20 MG: 20 TABLET, FILM COATED ORAL at 21:54

## 2024-12-25 RX ADMIN — POLYETHYLENE GLYCOL 3350 17 G: 17 POWDER, FOR SOLUTION ORAL at 07:42

## 2024-12-25 RX ADMIN — SENNOSIDES AND DOCUSATE SODIUM 2 TABLET: 50; 8.6 TABLET ORAL at 06:07

## 2024-12-25 RX ADMIN — SENNOSIDES AND DOCUSATE SODIUM 2 TABLET: 50; 8.6 TABLET ORAL at 16:14

## 2024-12-25 RX ADMIN — ACETAMINOPHEN 1000 MG: 500 TABLET ORAL at 15:23

## 2024-12-25 RX ADMIN — LIDOCAINE 1 PATCH: 4 PATCH TOPICAL at 11:44

## 2024-12-25 RX ADMIN — HYDROMORPHONE HYDROCHLORIDE 0.5 MG: 0.2 INJECTION, SOLUTION INTRAMUSCULAR; INTRAVENOUS; SUBCUTANEOUS at 23:52

## 2024-12-25 RX ADMIN — METHOCARBAMOL TABLETS 1000 MG: 500 TABLET, COATED ORAL at 15:22

## 2024-12-25 RX ADMIN — METHOCARBAMOL TABLETS 1000 MG: 500 TABLET, COATED ORAL at 11:45

## 2024-12-25 RX ADMIN — OXYCODONE HYDROCHLORIDE 10 MG: 10 TABLET ORAL at 10:26

## 2024-12-25 RX ADMIN — BUPROPION HYDROCHLORIDE 300 MG: 150 TABLET, EXTENDED RELEASE ORAL at 07:42

## 2024-12-25 RX ADMIN — OXYCODONE HYDROCHLORIDE 10 MG: 10 TABLET ORAL at 02:06

## 2024-12-25 RX ADMIN — PANTOPRAZOLE SODIUM 40 MG: 40 TABLET, DELAYED RELEASE ORAL at 07:41

## 2024-12-25 RX ADMIN — ACETAMINOPHEN 1000 MG: 500 TABLET ORAL at 20:12

## 2024-12-25 RX ADMIN — ACETAMINOPHEN 1000 MG: 500 TABLET ORAL at 07:42

## 2024-12-25 RX ADMIN — HYDROMORPHONE HYDROCHLORIDE 0.5 MG: 0.2 INJECTION, SOLUTION INTRAMUSCULAR; INTRAVENOUS; SUBCUTANEOUS at 03:34

## 2024-12-25 RX ADMIN — OXYCODONE HYDROCHLORIDE 10 MG: 10 TABLET ORAL at 06:07

## 2024-12-25 RX ADMIN — OXYCODONE HYDROCHLORIDE 10 MG: 10 TABLET ORAL at 20:13

## 2024-12-25 RX ADMIN — ACETAMINOPHEN 1000 MG: 500 TABLET ORAL at 11:45

## 2024-12-25 RX ADMIN — METHOCARBAMOL TABLETS 1000 MG: 500 TABLET, COATED ORAL at 20:12

## 2024-12-25 RX ADMIN — BISACODYL 10 MG: 10 SUPPOSITORY RECTAL at 21:54

## 2024-12-25 RX ADMIN — METHOCARBAMOL TABLETS 1000 MG: 500 TABLET, COATED ORAL at 07:41

## 2024-12-25 RX ADMIN — HYDROMORPHONE HYDROCHLORIDE 0.4 MG: 0.2 INJECTION, SOLUTION INTRAMUSCULAR; INTRAVENOUS; SUBCUTANEOUS at 13:01

## 2024-12-25 ASSESSMENT — ACTIVITIES OF DAILY LIVING (ADL)
ADLS_ACUITY_SCORE: 39
ADLS_ACUITY_SCORE: 37
ADLS_ACUITY_SCORE: 39
ADLS_ACUITY_SCORE: 37
ADLS_ACUITY_SCORE: 37
ADLS_ACUITY_SCORE: 39
ADLS_ACUITY_SCORE: 37
ADLS_ACUITY_SCORE: 39
ADLS_ACUITY_SCORE: 37

## 2024-12-25 NOTE — PLAN OF CARE
Neuro: A&Ox4.   Cardiac: No tele orders, VSS.   Respiratory: Sating >90% on 1L NC.   GI/: Adequate urine output. No BM.   Diet/appetite: Tolerating regular diet. Eating well.  Activity:  Stand by Assist, up to chair and in halls.  Pain: controlled with dilaudid IV x1, Oxycodone Q4H, scheduled tylenol and robaxin   Skin: No new deficits noted.  LDA's: PIVx 1. CT removed today.     Plan: Continue with POC. Notify primary team with changes.

## 2024-12-25 NOTE — PROGRESS NOTES
S:Continues to have issues with pain control. Added on additional adjuncts. Chest tube with 300cc of output over the past 24 hours.     O: BP (!) 150/105 (BP Location: Left arm)   Pulse 81   Temp 97.9  F (36.6  C) (Oral)   Resp 26   Ht 1.829 m (6')   Wt 83.2 kg (183 lb 6.4 oz)   SpO2 94%   BMI 24.87 kg/m    GENERAL: resting comfortably in bed, NAD  PULM: no increased WOB, CT w/o air leak   ABD: non-distended, incisions covered with dressings, appropriately tender, soft.     A/P: 54 year old male who underwent lap left diaphragmatic plication on 12/23 with Dr. Durán.     He is recovering well post-operatively. Will recheck chest tube output around noon and see if possible to pull chest tube.     - multimodal pain control   - chest tube to water seal; daily CXR; IS 10x/hr, Oob 4x/day    - reg diet     Seen and discussed with staff.     Karishma Cervantes MD  General Surgery Resident  [phone]

## 2024-12-25 NOTE — PROGRESS NOTES
STAFF ADDENDUM:  I saw and evaluated Mr. Randall and agree with the resident s findings and plan of care       300cc from chest tube   Pain not well controlled    Keep chets tube in  Pain managaemnt      Henrietta Penn MD

## 2024-12-25 NOTE — PLAN OF CARE
Neuro: A&Ox4.   Cardiac: HR 80s. BP 150s-160s/ 100s.   Respiratory: Sating >92% on 2L NC.  GI/: Adequate urine output. No BM, not passing gas. PRN senna given x2.  Diet/appetite: Tolerating regular diet.   Activity: SBA, up to bathroom and in halls.  Pain: 6-10/10 chest tube site pain. PRN IV dilaudid given x 3 and PO oxy given x 3  Skin: No new deficits noted.  LDA's: L PIV SL.    Plan: Continue with POC. Notify primary team with changes.             Goal Outcome Evaluation:           Overall Patient Progress: no changeOverall Patient Progress: no change    Outcome Evaluation: Pt having 6-10/10 chest tube site pain. PRN dilaudid and oxy given. Shallow breathing, 2L NC.

## 2024-12-25 NOTE — PROGRESS NOTES
Chest tube pulled at bedside. Post pull CXR ordered.    Karishma Cervantes MD  General Surgery Resident  [phone]

## 2024-12-25 NOTE — PROGRESS NOTES
Brief Surgery Crossover Note    He states he feels as if needs to burp but cannot get it out and has relief with TUMS. He does not have nausea or vomiting, has not had a bowel movement in the last 24 hours. Pain is adequately controlled on current regimen. Reviewed labs, Ca is normal at 8.9. 1x dose of TUMS 500mg given.     Devon Condon MD  General Surgery PGY-1    **For on call schedules and contact information, please refer to [facility]**

## 2024-12-25 NOTE — PLAN OF CARE
BP (!) 146/94 (BP Location: Left arm, Cuff Size: Adult Regular)   Pulse 95   Temp 98.1  F (36.7  C) (Oral)   Resp 20   Ht 1.829 m (6')   Wt 83 kg (182 lb 15.7 oz)   SpO2 97%   BMI 24.82 kg/m      Shift: 0700 - 1930  VS: Stable on 2 L nasal cannula, afebrile  Cardiac: No telemetry monitoring in progress, apical pulse is regular. HR: 80s-90s, blood pressures slightly hypertensive  Neuro: AOx4  Respiratory: Dyspnea on exertion, infrequent congested cough noted. Lung sounds clear throughout with diminished bases bilaterally.  Pain/Nausea/PRN: Pt denies nausea. PRN IV dilaudid given x3, PRN oxycodone given x3, PRN senna given x1, PRN tums given x1  Diet: Regular  LDA: L PIV (SL), L CT (to water seal)  GI/: No BM since surgery, voiding without issue.  Skin: No new findings this shift. CT dressing changed x2 d/t leakage and bleeding  Mobility: SBA  Plan: Continue to encourage activity, monitor CT output and respiratory status.    Will give report to oncoming nurse. Pt left in stable condition, care relinquished at this time.

## 2024-12-26 VITALS
RESPIRATION RATE: 17 BRPM | OXYGEN SATURATION: 92 % | HEART RATE: 80 BPM | TEMPERATURE: 98.7 F | SYSTOLIC BLOOD PRESSURE: 139 MMHG | BODY MASS INDEX: 24.87 KG/M2 | WEIGHT: 183.64 LBS | HEIGHT: 72 IN | DIASTOLIC BLOOD PRESSURE: 99 MMHG

## 2024-12-26 LAB
ANION GAP SERPL CALCULATED.3IONS-SCNC: 11 MMOL/L (ref 7–15)
BUN SERPL-MCNC: 12.4 MG/DL (ref 6–20)
CALCIUM SERPL-MCNC: 8.7 MG/DL (ref 8.8–10.4)
CHLORIDE SERPL-SCNC: 98 MMOL/L (ref 98–107)
CREAT SERPL-MCNC: 0.94 MG/DL (ref 0.67–1.17)
EGFRCR SERPLBLD CKD-EPI 2021: >90 ML/MIN/1.73M2
ERYTHROCYTE [DISTWIDTH] IN BLOOD BY AUTOMATED COUNT: 12.4 % (ref 10–15)
GLUCOSE BLDC GLUCOMTR-MCNC: 130 MG/DL (ref 70–99)
GLUCOSE SERPL-MCNC: 118 MG/DL (ref 70–99)
HCO3 SERPL-SCNC: 24 MMOL/L (ref 22–29)
HCT VFR BLD AUTO: 42.5 % (ref 40–53)
HGB BLD-MCNC: 14.9 G/DL (ref 13.3–17.7)
MAGNESIUM SERPL-MCNC: 1.8 MG/DL (ref 1.7–2.3)
MCH RBC QN AUTO: 29.4 PG (ref 26.5–33)
MCHC RBC AUTO-ENTMCNC: 35.1 G/DL (ref 31.5–36.5)
MCV RBC AUTO: 84 FL (ref 78–100)
PLATELET # BLD AUTO: 209 10E3/UL (ref 150–450)
POTASSIUM SERPL-SCNC: 4.2 MMOL/L (ref 3.4–5.3)
RBC # BLD AUTO: 5.07 10E6/UL (ref 4.4–5.9)
SODIUM SERPL-SCNC: 133 MMOL/L (ref 135–145)
WBC # BLD AUTO: 8.7 10E3/UL (ref 4–11)

## 2024-12-26 PROCEDURE — 80048 BASIC METABOLIC PNL TOTAL CA: CPT

## 2024-12-26 PROCEDURE — 85018 HEMOGLOBIN: CPT

## 2024-12-26 PROCEDURE — 82310 ASSAY OF CALCIUM: CPT

## 2024-12-26 PROCEDURE — 120N000003 HC R&B IMCU UMMC

## 2024-12-26 PROCEDURE — 83735 ASSAY OF MAGNESIUM: CPT | Performed by: THORACIC SURGERY (CARDIOTHORACIC VASCULAR SURGERY)

## 2024-12-26 PROCEDURE — 36415 COLL VENOUS BLD VENIPUNCTURE: CPT

## 2024-12-26 PROCEDURE — 250N000013 HC RX MED GY IP 250 OP 250 PS 637: Performed by: STUDENT IN AN ORGANIZED HEALTH CARE EDUCATION/TRAINING PROGRAM

## 2024-12-26 RX ORDER — LIDOCAINE 4 G/G
1 PATCH TOPICAL EVERY 24 HOURS
Qty: 5 PATCH | Refills: 0 | Status: SHIPPED | OUTPATIENT
Start: 2024-12-26

## 2024-12-26 RX ORDER — ACETAMINOPHEN 500 MG
1000 TABLET ORAL 4 TIMES DAILY
Qty: 64 TABLET | Refills: 0 | Status: SHIPPED | OUTPATIENT
Start: 2024-12-26

## 2024-12-26 RX ORDER — PANTOPRAZOLE SODIUM 40 MG/1
40 TABLET, DELAYED RELEASE ORAL
Qty: 30 TABLET | Refills: 0 | Status: SHIPPED | OUTPATIENT
Start: 2024-12-27

## 2024-12-26 RX ORDER — SIMETHICONE 80 MG
80 TABLET,CHEWABLE ORAL EVERY 6 HOURS PRN
Status: DISCONTINUED | OUTPATIENT
Start: 2024-12-26 | End: 2024-12-26

## 2024-12-26 RX ORDER — OXYCODONE HYDROCHLORIDE 5 MG/1
5 TABLET ORAL EVERY 4 HOURS PRN
Qty: 27 TABLET | Refills: 0 | Status: SHIPPED | OUTPATIENT
Start: 2024-12-26

## 2024-12-26 RX ORDER — POLYETHYLENE GLYCOL 3350 17 G/17G
17 POWDER, FOR SOLUTION ORAL 2 TIMES DAILY
Qty: 510 G | Refills: 0 | Status: SHIPPED | OUTPATIENT
Start: 2024-12-26 | End: 2025-01-10

## 2024-12-26 RX ORDER — SIMETHICONE 80 MG
80 TABLET,CHEWABLE ORAL EVERY 6 HOURS PRN
Status: ACTIVE | OUTPATIENT
Start: 2024-12-26

## 2024-12-26 RX ORDER — METHOCARBAMOL 1000 MG/1
1000 TABLET, FILM COATED ORAL EVERY 6 HOURS PRN
Qty: 40 TABLET | Refills: 0 | Status: SHIPPED | OUTPATIENT
Start: 2024-12-26

## 2024-12-26 RX ORDER — OXYCODONE HYDROCHLORIDE 5 MG/1
5 TABLET ORAL EVERY 4 HOURS PRN
Qty: 25 TABLET | Refills: 0 | Status: SHIPPED | OUTPATIENT
Start: 2024-12-26 | End: 2024-12-26

## 2024-12-26 RX ADMIN — METHOCARBAMOL TABLETS 1000 MG: 500 TABLET, COATED ORAL at 08:19

## 2024-12-26 RX ADMIN — SENNOSIDES AND DOCUSATE SODIUM 2 TABLET: 50; 8.6 TABLET ORAL at 11:19

## 2024-12-26 RX ADMIN — ACETAMINOPHEN 1000 MG: 500 TABLET ORAL at 15:51

## 2024-12-26 RX ADMIN — METHOCARBAMOL TABLETS 1000 MG: 500 TABLET, COATED ORAL at 19:51

## 2024-12-26 RX ADMIN — ACETAMINOPHEN 1000 MG: 500 TABLET ORAL at 19:51

## 2024-12-26 RX ADMIN — SENNOSIDES AND DOCUSATE SODIUM 2 TABLET: 50; 8.6 TABLET ORAL at 03:51

## 2024-12-26 RX ADMIN — BUPROPION HYDROCHLORIDE 300 MG: 150 TABLET, EXTENDED RELEASE ORAL at 08:26

## 2024-12-26 RX ADMIN — METHOCARBAMOL TABLETS 1000 MG: 500 TABLET, COATED ORAL at 15:52

## 2024-12-26 RX ADMIN — PANTOPRAZOLE SODIUM 40 MG: 40 TABLET, DELAYED RELEASE ORAL at 08:19

## 2024-12-26 RX ADMIN — ACETAMINOPHEN 1000 MG: 500 TABLET ORAL at 08:19

## 2024-12-26 RX ADMIN — ROSUVASTATIN CALCIUM 20 MG: 20 TABLET, FILM COATED ORAL at 21:39

## 2024-12-26 RX ADMIN — METHOCARBAMOL TABLETS 1000 MG: 500 TABLET, COATED ORAL at 12:11

## 2024-12-26 RX ADMIN — ACETAMINOPHEN 1000 MG: 500 TABLET ORAL at 12:11

## 2024-12-26 RX ADMIN — OXYCODONE HYDROCHLORIDE 5 MG: 5 TABLET ORAL at 03:51

## 2024-12-26 RX ADMIN — POLYETHYLENE GLYCOL 3350 17 G: 17 POWDER, FOR SOLUTION ORAL at 08:20

## 2024-12-26 ASSESSMENT — ACTIVITIES OF DAILY LIVING (ADL)
ADLS_ACUITY_SCORE: 39
ADLS_ACUITY_SCORE: 38
ADLS_ACUITY_SCORE: 39
ADLS_ACUITY_SCORE: 38
ADLS_ACUITY_SCORE: 36
ADLS_ACUITY_SCORE: 38
ADLS_ACUITY_SCORE: 39
ADLS_ACUITY_SCORE: 38
ADLS_ACUITY_SCORE: 39
ADLS_ACUITY_SCORE: 38
ADLS_ACUITY_SCORE: 38
ADLS_ACUITY_SCORE: 39

## 2024-12-26 NOTE — PLAN OF CARE
"Neuro: A&Ox4.   Cardiac: HR 80s. BP 150s/ 100s.          Respiratory: Sating >90% on 1L NC.  GI/: Adequate urine output. Small BM, \"one nugget\" per pt. PRN senna and suppository given. Abdo x-ray completed.   Diet/appetite: Tolerating regular diet. Denies nausea.   Activity: SBA, up to bathroom and in halls.  Pain: 6-8/10 abdominal pain/bloating. PRN IV dilaudid given x 1 and PO oxy given x 1  Skin: No new deficits noted.  LDA's: L PIV SL.     Plan: Continue with POC. Notify primary team with changes      Goal Outcome Evaluation:      Plan of Care Reviewed With: patient, spouse    Overall Patient Progress: no changeOverall Patient Progress: no change    Outcome Evaluation: Pt on 1L NC. Pt having 6-8/10 abdominal pain/bloating. PRN dilaudid and oxy given. Pt very constipated, suppository and PRN senna given, and abdo x-ray completed.      "

## 2024-12-26 NOTE — PROGRESS NOTES
S:Given suppository yesterday due to lack of BM. Abd XR obtained which demonstrated a dilated transverse colon.  Patient has not been passing gas nor has he had a substantial BM. He denies N/V. Chest tube removed yesterday with clear post pull CXR.     O: BP (!) 156/115 (BP Location: Left arm)   Pulse 84   Temp 98.6  F (37  C) (Oral)   Resp 24   Ht 1.829 m (6')   Wt 83.3 kg (183 lb 10.3 oz)   SpO2 92%   BMI 24.91 kg/m    GENERAL: resting comfortably in bed, NAD  PULM: no increased WOB, CT w/o air leak   ABD: non-distended, incisions covered with dressings, appropriately tender, soft.     A/P: 54 year old male who underwent lap left diaphragmatic plication on 12/23 with Dr. Durán.     Patient with suspected ileus. Will give enema today.     - multimodal pain control   - enema today  - reg diet   - if he has an adequate BM, can leave as early as today    Seen and discussed with staff.     Karishma Cervantes MD  General Surgery Resident  [phone]

## 2024-12-26 NOTE — DISCHARGE INSTRUCTIONS
THORACIC SURGERY DISCHARGE INSTRUCTIONS    DIET: Regular diet - as prior to admission.      If your plans upon discharge include prolonged periods of sitting (i.e a lengthy car or plane ride), it is highly recommended to get up and walk at least once per hour to help prevent swelling and blood clots.     You may get incision wet 2 days after operation. Do not submerge, soak, or scrub incision or swim until seen in follow-up or after two weeks.   Activity as tolerated, no strenous activity until seen in follow-up, no lifting greater than 20 pounds for the next 2 weeks.    Stay hydrated. Take over the counter stool softeners (Miralax, docusate or senna) if becoming constipated with narcotic use.      Call for fever greater than 101.5, chills, increased size of incision, red skin around incision, vision changes, muscle strength changes, sensation changes, shortness of breath, or other concerns.    No driving while taking narcotic pain medication.    Transition to ibuprofen or tylenol/acetaminophen for pain control. Do not take tylenol/acetaminophen and acetaminophen containing narcotic (e.g., percocet or vicodin) at the same time. If you have known ulcer problems, or kidney trouble (elevated creatinine) do not take the ibuprofen.    In emergencies, call 911    For other Questions or Concerns;   A.) During weekday working hours (Monday through Friday 8am to 4:30pm)   call [phone] and ask to speak to a clinical nurse specialist.     B.) At nights (after 4:30pm), on weekends, or if urgent call [phone] and   tell the   I would like to page job code [id number], the thoracic surgery   resident on call, please.     2. Follow up will be arranged by the Thoracic office, we will contact you to set up the appointment.  You may call [phone] if you wish to schedule before you are contacted.

## 2024-12-26 NOTE — PROVIDER NOTIFICATION
"Time of notification: 2130  Provider notified: Felix Armando  -Paged about abdominal discomfort and no BM since prior to surgery, asked for suppository  - Provider ordered suppository, writer gave pt suppository w/ no effect  - Provider assessed at bedside and ordered abdo x-ray  [id number] writer paged provider: \"abdo x-ray results show possible postop ileus vs bowel obstruction\"  No new orders received.   "

## 2024-12-26 NOTE — PROGRESS NOTES
General Surgery Cross Cover Note: 9:34 PM 12/25/2024     Paged by nursing - no bowel movement and having abdominal discomfort.     Subjective  POD 2 from PLICATION, DIAPHRAGM, LAPAROSCOPIC, Left - Abdomen. Last bowel movement was prior to admission. Given miralax and Senna without good effect. Denies history of constipation, nausea, emesis, chest pain or sob. He is not passing flatus and he endorses bloating.     Objective   Temp: 98.1  F (36.7  C) Temp src: Oral BP: (!) 140/97 Pulse: 84   Resp: 22 SpO2: 91 % O2 Device: Nasal cannula Oxygen Delivery: 1 LPM Height: 182.9 cm (6') Weight: 83.2 kg (183 lb 6.4 oz)  Estimated body mass index is 24.87 kg/m  as calculated from the following:    Height as of this encounter: 1.829 m (6').    Weight as of this encounter: 83.2 kg (183 lb 6.4 oz).    Gen: mildly uncomfortable due bloating  Pul: NLB on 1L NC  Abd: mildly distended, soft and appropriately tender  Ext: WWP    A/P  - 1x Dulcolax ordered  - AXR pending, but film shows dilated loops of bowel.   - CBC, BMP    Felix Armando MD  Urology, PGY-1     Please page primary team with questions.

## 2024-12-27 ENCOUNTER — OFFICE VISIT (OUTPATIENT)
Dept: INTERPRETER SERVICES | Facility: CLINIC | Age: 54
End: 2024-12-27
Payer: COMMERCIAL

## 2024-12-27 VITALS
OXYGEN SATURATION: 93 % | HEIGHT: 72 IN | DIASTOLIC BLOOD PRESSURE: 96 MMHG | BODY MASS INDEX: 24.31 KG/M2 | RESPIRATION RATE: 20 BRPM | HEART RATE: 86 BPM | TEMPERATURE: 98.8 F | SYSTOLIC BLOOD PRESSURE: 143 MMHG | WEIGHT: 179.45 LBS

## 2024-12-27 LAB
GLUCOSE BLDC GLUCOMTR-MCNC: 121 MG/DL (ref 70–99)
HOLD SPECIMEN: NORMAL
MAGNESIUM SERPL-MCNC: 2 MG/DL (ref 1.7–2.3)
POTASSIUM SERPL-SCNC: 4.3 MMOL/L (ref 3.4–5.3)

## 2024-12-27 PROCEDURE — T1013 SIGN LANG/ORAL INTERPRETER: HCPCS | Mod: U3

## 2024-12-27 PROCEDURE — 84132 ASSAY OF SERUM POTASSIUM: CPT | Performed by: THORACIC SURGERY (CARDIOTHORACIC VASCULAR SURGERY)

## 2024-12-27 PROCEDURE — 250N000013 HC RX MED GY IP 250 OP 250 PS 637: Performed by: STUDENT IN AN ORGANIZED HEALTH CARE EDUCATION/TRAINING PROGRAM

## 2024-12-27 PROCEDURE — 36415 COLL VENOUS BLD VENIPUNCTURE: CPT | Performed by: THORACIC SURGERY (CARDIOTHORACIC VASCULAR SURGERY)

## 2024-12-27 PROCEDURE — 250N000013 HC RX MED GY IP 250 OP 250 PS 637

## 2024-12-27 PROCEDURE — 83735 ASSAY OF MAGNESIUM: CPT | Performed by: THORACIC SURGERY (CARDIOTHORACIC VASCULAR SURGERY)

## 2024-12-27 RX ADMIN — BUPROPION HYDROCHLORIDE 300 MG: 150 TABLET, EXTENDED RELEASE ORAL at 09:14

## 2024-12-27 RX ADMIN — ANTACID TABLETS 1000 MG: 500 TABLET, CHEWABLE ORAL at 11:48

## 2024-12-27 RX ADMIN — SIMETHICONE 80 MG: 80 TABLET, CHEWABLE ORAL at 05:24

## 2024-12-27 RX ADMIN — PANTOPRAZOLE SODIUM 40 MG: 40 TABLET, DELAYED RELEASE ORAL at 09:14

## 2024-12-27 RX ADMIN — ACETAMINOPHEN 1000 MG: 500 TABLET ORAL at 09:17

## 2024-12-27 RX ADMIN — METHOCARBAMOL TABLETS 1000 MG: 500 TABLET, COATED ORAL at 09:17

## 2024-12-27 ASSESSMENT — ACTIVITIES OF DAILY LIVING (ADL)
ADLS_ACUITY_SCORE: 38
ADLS_ACUITY_SCORE: 36
ADLS_ACUITY_SCORE: 38
ADLS_ACUITY_SCORE: 36
ADLS_ACUITY_SCORE: 38
ADLS_ACUITY_SCORE: 36
ADLS_ACUITY_SCORE: 36
ADLS_ACUITY_SCORE: 38
ADLS_ACUITY_SCORE: 38

## 2024-12-27 NOTE — PLAN OF CARE
Hours of care: [id number]    Neuro: A&Ox4. [place].   Cardiac: Off tele. VSS.   Respiratory: Sating >92% on RA.  GI/: Adequate urine output. BM X2. Miralax, senna, and enema given with good effect.  Diet/appetite: Tolerating regular diet. Eating well.  Activity:  Independent, up to chair and in halls.  Pain: At acceptable level on current regimen. C/o constant incisional and generalized abdominal pain. Cool therapy and scheduled meds given with good relief.  Skin: No new deficits noted.  LDA's: L PIV (SL)    Plan: Continue with POC. Notify primary team with changes.    Goal Outcome Evaluation:      Plan of Care Reviewed With: patient, spouse    Overall Patient Progress: improvingOverall Patient Progress: improving    Outcome Evaluation: Weaned to RA. Pain tolerable with scheduled Tylenol/Robaxin. No PRN dilaudid or oxy given.  Pt passing large stool and flatus at end of shift.

## 2024-12-27 NOTE — PLAN OF CARE
A:   Neuro: A&Ox4. Uses call light appropriately. Patient calm and cooperative. Patient denied headache, dizziness, and lightheadedness.  Cardiac/Tele: VSS. Patient denied chest pain and palpitations. Afebrile.  Respiratory: Sats > 95% on room air.   GI/: Continent. Ambulates to bathroom. Adequate urine output. Patient denied nausea or vomiting. No BM on shift, patient declined bowel meds.  Diet/Appetite: Regular diet. Adequate oral intake.  Skin: Lap sites and old chest tube site all WDL.   LDAs: 1 PIV removed prior to discharge.   Activity: Independent.  Pain: 4/10 incision/abdominal pain reduced to 2/10 with scheduled tylenol.  Electrolytes: No electrolytes replaced during shift.       P: Continue to monitor and notify team with changes. Reviewed plan of care with patient    Shift: 07:00 to 12:48    DISCHARGE                           ----------------------------------------------------------------------------  Discharged to: Home  Via: private transportation  Accompanied by: Spouse  Discharge Instructions: regular diet, patient instructed to not lift more than 15 pounds for 2 weeks. Medications, follow up appointments, when to call the MD, aftercare instructions reviewed with patient and spouse  Prescriptions: To be filled by [facility]; medication list reviewed & sent with pt. Paper prescription for oxycodone given to patient to be filled at a pharmacy of their choice.   Follow Up Appointments: arranged; information given  Belongings: All sent with pt  IV: d/c'd  Telemetry: d/c'd  Pt exhibits understanding of above discharge instructions; all questions answered.    Discharge Paperwork: Signed, copied, and sent home with patient.

## 2024-12-27 NOTE — PLAN OF CARE
Neuro: A&Ox4.   Cardiac: not on tele VSS.   Respiratory: Sating >90% on RA.  GI/: Adequate urine output. BM X1  Diet/appetite: Tolerating regular diet  Activity:  independent up to the bathroom  Pain: At acceptable level on current regimen.   Skin: No new deficits noted.  LDA's:L PIV SL    Plan: Continue with POC. Notify primary team with changes.     Goal Outcome Evaluation:                 Outcome Evaluation: sating>90% on RA. pain is managed with scheduled tylenol and robaxin.      Problem: Adult Inpatient Plan of Care  Goal: Absence of Hospital-Acquired Illness or Injury  Intervention: Prevent Infection  Recent Flowsheet Documentation  Taken 12/27/2024 0400 by Gwyn Loya, RN  Infection Prevention:   cohorting utilized   environmental surveillance performed   equipment surfaces disinfected   hand hygiene promoted   personal protective equipment utilized   rest/sleep promoted   single patient room provided  Taken 12/26/2024 2321 by Gwyn Loya, RN  Infection Prevention:   cohorting utilized   environmental surveillance performed   equipment surfaces disinfected   hand hygiene promoted   personal protective equipment utilized   rest/sleep promoted   single patient room provided  Taken 12/26/2024 2000 by Gwyn Loya, RN  Infection Prevention:   cohorting utilized   environmental surveillance performed   equipment surfaces disinfected   hand hygiene promoted   personal protective equipment utilized   rest/sleep promoted   single patient room provided

## 2024-12-27 NOTE — DISCHARGE SUMMARY
NAME: Enmanuel Randall   MRN: [id number]   : 1970     DATE OF ADMISSION: 2024     PRE/POSTOPERATIVE DIAGNOSES: Diaphragm paralysis     PROCEDURES PERFORMED: Laparoscopic diaphragm plication     PATHOLOGY RESULTS: None     CULTURE RESULTS: None      INTRAOPERATIVE COMPLICATIONS: None     POSTOPERATIVE MEDICAL ISSUES: None     DRAINS/TUBES PRESENT AT DISCHARGE: None    DATE OF DISCHARGE:  24    HOSPITAL COURSE: Enmanuel Randall is a 54 year old male who on 2024  underwent the above-named procedures.  He tolerated the operation well and postoperatively was transferred to the general post-surgical unit.  The remainder of his course was essentially uncomplicated. Chest tube removed on 24 without complication.  Prior to discharge, his pain was controlled well, he was able to perform ADLs and ambulate independently without difficulty, and had full return of bowel and bladder function.  On 2024, he was discharged to home in stable condition.    DISCHARGE EXAM:   A&O, NAD  Resp non-labored on room air   Abdomen soft, non-tender, non-distended   Distal extremities warm    Incisions clean/dry/intact      DISCHARGE INSTRUCTIONS:  Discharge Procedure Orders   Reason for your hospital stay   Order Comments: Left diaphragm plication.     Activity   Order Comments: Your activity upon discharge: activity as tolerated. Avoid lifting greater than 15 pounds for two weeks.     Order Specific Question Answer Comments   Is discharge order? Yes      Adult [facility] Follow-up and recommended labs and tests   Order Comments: You have follow up with thoracic surgery on 25.     Appointments on [place] and/or [facility] (with [facility] or [facility] provider or service). Call [phone] if you haven't heard regarding these appointments within 7 days of discharge.     Wound care and dressings   Order Comments: Instructions to care for your wound at home: You have bandaids over your incisions that you can take  off. Under these bandaids are a surgical tape that will come off on its own in a few weeks. You can shower daily and pat them dry. Please avoid submerging yourself in bodies of water such as lakes, bath tubs, pools, and etc. You can shower daily.     Diet   Order Comments: Follow this diet upon discharge: Regular     Order Specific Question Answer Comments   Is discharge order? Yes        DISCHARGE MEDICATIONS:   Current Discharge Medication List        START taking these medications    Details   acetaminophen (TYLENOL) 500 MG tablet Take 2 tablets (1,000 mg) by mouth or Feeding Tube 4 times daily.  Qty: 64 tablet, Refills: 0    Associated Diagnoses: Status post plication of diaphragm      Lidocaine (LIDOCARE) 4 % Patch Place 1 patch over 12 hours onto the skin every 24 hours. To prevent lidocaine toxicity, patient should be patch free for 12 hrs daily.  Qty: 5 patch, Refills: 0    Associated Diagnoses: Status post plication of diaphragm      methocarbamol 1000 MG TABS Take 1,000 mg by mouth every 6 hours as needed for muscle spasms.  Qty: 40 tablet, Refills: 0    Associated Diagnoses: Status post plication of diaphragm      oxyCODONE (ROXICODONE) 5 MG tablet Take 1 tablet (5 mg) by mouth every 4 hours as needed for moderate pain (IF pain not managed with non-pharmacological and non-opioid interventions).  Qty: 27 tablet, Refills: 0    Associated Diagnoses: Status post plication of diaphragm      pantoprazole (PROTONIX) 40 MG EC tablet Take 1 tablet (40 mg) by mouth every morning (before breakfast).  Qty: 30 tablet, Refills: 0    Associated Diagnoses: Status post plication of diaphragm      polyethylene glycol (MIRALAX) 17 GM/Dose powder Take 17 g by mouth 2 times daily for 15 days.  Qty: 510 g, Refills: 0    Associated Diagnoses: Status post plication of diaphragm           CONTINUE these medications which have NOT CHANGED    Details   buPROPion (WELLBUTRIN XL) 150 MG 24 hr tablet Take 150 mg by mouth every  morning.      loratadine (CLARITIN) 10 MG tablet Take 10 mg by mouth daily as needed.      omeprazole (PRILOSEC) 40 MG DR capsule Take 1 capsule by mouth every morning.      rosuvastatin (CRESTOR) 20 MG tablet Take 20 mg by mouth at bedtime.      sildenafil (VIAGRA) 100 MG tablet Take 100 mg by mouth daily as needed.      Turmeric (QC TUMERIC COMPLEX PO) Take 2 tablets by mouth daily.      Vitamin D3 (VITAMIN D, CHOLECALCIFEROL,) 25 mcg (1000 units) tablet Take 25 mcg by mouth daily.

## 2024-12-30 NOTE — PROVIDER NOTIFICATION
[facility] called reporting that oxy script did not have a EVA number.  Thoracic resident Katherine Schwartz contacted regarding, she requested the information about the pharmacy with phone number and patient details be sent to her in [employer] so she can pass on the Thoracic fellow for follow up.     Details sent to Katherine Liang via [employer] for follow up  [place] [employer] [phone]

## 2024-12-31 ENCOUNTER — PATIENT OUTREACH (OUTPATIENT)
Dept: SURGERY | Facility: CLINIC | Age: 54
End: 2024-12-31
Payer: COMMERCIAL

## 2024-12-31 NOTE — TELEPHONE ENCOUNTER
"Post Op Discharge Call    Surgery: Laparoscopic diaphragm plication     Surgery date: 12/23/2024    Discharge Date:  12/27/2024    Immediate Concerns: None at this time.     Pain:  No concerns with pain management.   Patient reports that he has stopped taking oxycodone. Currently taking scheduled tylenol and methocarbamol twice a day. States that pain can get as high as 6/10 but after taking the tylenol and methocarbamol it brings his pain to a 3/10. Informed patient that he can take the tylenol four times a day scheduled and the methocarbamol every 6 hours as needed.      Incision:   No concerns, healing well, no redness, drainage or edema reported.   Reports that the incision on his abdomen appears more swollen, or \"puffy\". Instructed patient to apply ice to the area twice a day.  Steri-strips intact. Reviewed showering and care instructions.  Still has dressing on old chest tube site. Informed patient that he can remove the dressing and apply a clean dry gauze dressing to the site until it is fully healed.  Reviewed that it is normal for some drainage or gushing from site during activities that increase intrathoracic pressure while site heals.     Drains:   No drain.     Diet:   Regular diet     Bowels:   Passing gas.   Patient reports he has had bowel movement post op.   Verbalizes that he feels regular.     Activity:   No difficulty with ADLs reported.   Patient is up independently at home.   Encourage patient to continue to stay active.   Verbalizes that he is following his lifting and activity restrictions. States that his  and children are monitoring him to make sure that he doesn't do more than he is suppose to.    Post op/follow up plans:   Post op appointment scheduled,confirmed date and time with patient.       Future Appointments   Date Time Provider Department Center   1/20/2025  2:25 PM [id number] [facility]   1/20/2025  3:00 PM Marguerite Kennedy APRN CNS [facility]       Patient with " no additional questions or concerns at this time.   Patient has RNCC direct number for any questions or concerns that may arise. Patient appreciated writer's call.    Tarah Lindquist RN, BSN  Thoracic Surgery RN Care Coordinator

## 2025-01-20 ENCOUNTER — ONCOLOGY VISIT (OUTPATIENT)
Dept: SURGERY | Facility: CLINIC | Age: 55
End: 2025-01-20
Attending: CLINICAL NURSE SPECIALIST
Payer: COMMERCIAL

## 2025-01-20 ENCOUNTER — ANCILLARY PROCEDURE (OUTPATIENT)
Dept: GENERAL RADIOLOGY | Facility: CLINIC | Age: 55
End: 2025-01-20
Attending: CLINICAL NURSE SPECIALIST
Payer: COMMERCIAL

## 2025-01-20 VITALS
SYSTOLIC BLOOD PRESSURE: 147 MMHG | BODY MASS INDEX: 25.33 KG/M2 | WEIGHT: 186.8 LBS | DIASTOLIC BLOOD PRESSURE: 102 MMHG | RESPIRATION RATE: 18 BRPM | OXYGEN SATURATION: 96 % | TEMPERATURE: 98.3 F | HEART RATE: 82 BPM

## 2025-01-20 DIAGNOSIS — J98.6 PARALYZED HEMIDIAPHRAGM: Primary | ICD-10-CM

## 2025-01-20 DIAGNOSIS — J98.6 DIAPHRAGM PARALYSIS: ICD-10-CM

## 2025-01-20 PROCEDURE — 99024 POSTOP FOLLOW-UP VISIT: CPT | Performed by: CLINICAL NURSE SPECIALIST

## 2025-01-20 PROCEDURE — 99213 OFFICE O/P EST LOW 20 MIN: CPT | Performed by: CLINICAL NURSE SPECIALIST

## 2025-01-20 PROCEDURE — 71046 X-RAY EXAM CHEST 2 VIEWS: CPT | Mod: GC | Performed by: RADIOLOGY

## 2025-01-20 ASSESSMENT — PAIN SCALES - GENERAL: PAINLEVEL_OUTOF10: MILD PAIN (2)

## 2025-01-20 NOTE — NURSING NOTE
Oncology Rooming Note    January 20, 2025 2:53 PM   Enmanuel Randall is a 54 year old male who presents for:    Chief Complaint   Patient presents with    Oncology Clinic Visit     Diaphragm paralysis     Initial Vitals: BP (!) 147/102 (BP Location: Right arm, Patient Position: Sitting, Cuff Size: Adult Regular)   Pulse 82   Temp 98.3  F (36.8  C) (Oral)   Resp 18   Wt 84.7 kg (186 lb 12.8 oz)   SpO2 96%   BMI 25.33 kg/m   Estimated body mass index is 25.33 kg/m  as calculated from the following:    Height as of 12/23/24: 1.829 m (6').    Weight as of this encounter: 84.7 kg (186 lb 12.8 oz). Body surface area is 2.07 meters squared.  Mild Pain (2) Comment: Data Unavailable   No LMP for male patient.  Allergies reviewed: Yes  Medications reviewed: Yes    Medications: Medication refills not needed today.  Pharmacy name entered into [employer]:    [id number] - [place], [place] - [address] [id number] - [place], [place] - [address] [facility] [id number] [place], [place] - [address] AT [facility] - [place], [place] - [address]    Frailty Screening:   Is the patient here for a new oncology consult visit in cancer care? 2. No      Clinical concerns: Patient would like to ask about the long-term consequences and limitations of his procedure. They also wanted to ask why his procedure was longer than expected.        Ty Stein, EMT

## 2025-01-20 NOTE — LETTER
1/20/2025      Enmanuel Randall  [address] [place] [address]      Dear Colleague,    Thank you for referring your patient, Enmanuel Randall, to the [facility]. Please see a copy of my visit note below.    THORACIC SURGERY FOLLOW UP VISIT    I saw Mr. Enmanuel Randall in follow-up today. The clinical summary follows:     PREOP DIAGNOSIS   Left diaphragm paralysis  PROCEDURE   Laparoscopic left diaphragm plication  DATE OF PROCEDURE  12/23/2024    COMPLICATIONS  None    INTERVAL STUDIES  CXR 01/20/2025: Decreased left basilar opacities with residual elevated left hemidiaphragm and left basilar atelectasis and small left effusion. There is now a more focal presumed eventration of the left hemidiaphragm, than on prior studies.        Past Medical History:   Diagnosis Date     Cervical radiculopathy      Diaphragm paralysis      Duodenal ulcer 09/22/2023     Dyslipidemia      ED (erectile dysfunction)      Seborrheic dermatitis      Spinal stenosis in cervical region      Tobacco use disorder      Upper GI bleed       Past Surgical History:   Procedure Laterality Date     BACK SURGERY      C4-C5 surgery     COLONOSCOPY       ESOPHAGOSCOPY, GASTROSCOPY, DUODENOSCOPY (EGD), COMBINED N/A 09/03/2023    Procedure: ESOPHAGOGASTRODUODENOSCOPY with EPINEPHRINE INJECTION and CAUTERY and BIOPSY;  Surgeon: Eliazar Leyva MD;  Location: [facility] OR     HERNIA REPAIR Left     inguinal hernia repair     LAPAROSCOPIC DIAPHRAMIC PLICATION Left 12/23/2024    Procedure: PLICATION, DIAPHRAGM, LAPAROSCOPIC;  Surgeon: Lesia Mora MD;  Location: [facility] OR     REPLACE DISK CERVICAL ANTERIOR N/A 10/10/2023    Procedure: Cervical 5 to cervical 6 arthroplasty;  Surgeon: Jered Salvador MD;  Location:  OR      Social History     Socioeconomic History     Marital status:      Spouse name: Not on file     Number of children: Not on file     Years of education: Not on file     Highest  education level: Not on file   Occupational History     Not on file   Tobacco Use     Smoking status: Former     Current packs/day: 0.00     Average packs/day: 1 pack/day for 10.0 years (10.0 ttl pk-yrs)     Types: Cigarettes     Start date: 10/2/2013     Quit date: 10/2/2023     Years since quittin.3     Smokeless tobacco: Never   Vaping Use     Vaping status: Never Used   Substance and Sexual Activity     Alcohol use: Yes     Comment: 1-2x per month     Drug use: Never     Sexual activity: Not on file   Other Topics Concern     Not on file   Social History Narrative     Not on file     Social Drivers of Health     Financial Resource Strain: Low Risk  (2024)    Financial Resource Strain      Within the past 12 months, have you or your family members you live with been unable to get utilities (heat, electricity) when it was really needed?: No   Food Insecurity: Low Risk  (2024)    Food Insecurity      Within the past 12 months, did you worry that your food would run out before you got money to buy more?: No      Within the past 12 months, did the food you bought just not last and you didn t have money to get more?: No   Transportation Needs: Low Risk  (2024)    Transportation Needs      Within the past 12 months, has lack of transportation kept you from medical appointments, getting your medicines, non-medical meetings or appointments, work, or from getting things that you need?: No   Physical Activity: Insufficiently Active (2019)    Received from [facility]     Exercise Vital Sign      Days of Exercise per Week: 3 days      Minutes of Exercise per Session: 30 min   Stress: No Stress Concern Present (2019)    Received from [facility]     [place] [employer] - Occupational Stress Questionnaire      Feeling of Stress : Not at all   Social Connections: Socially Integrated (2019)    Received  [facility]     Social Connection and Isolation Panel [NHANES]      Frequency of Communication with Friends and Family: More than three times a week      Frequency of Social Gatherings with Friends and Family: Once a week      Attends Confucianism Services: 1 to 4 times per year      Active Member of Clubs or Organizations: Yes      Attends Club or Organization Meetings: More than 4 times per year      Marital Status:    Interpersonal Safety: Low Risk  (12/23/2024)    Interpersonal Safety      Do you feel physically and emotionally safe where you currently live?: Yes      Within the past 12 months, have you been hit, slapped, kicked or otherwise physically hurt by someone?: No      Within the past 12 months, have you been humiliated or emotionally abused in other ways by your partner or ex-partner?: No   Housing Stability: Low Risk  (12/24/2024)    Housing Stability      Do you have housing? : Yes      Are you worried about losing your housing?: No      SUBJECTIVE  Enmanuel is doing well. He does not really have ongoing surgical pain. He has some occasional discomfort but nothing that is hindering his daily life. 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 His , Alberto is very helpful in keeping him within the restrictions Dr. Durán gave him. 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    OBJECTIVE  BP (!) 147/102 (BP Location: Right arm, Patient Position: Sitting, Cuff Size: Adult Regular)   Pulse 82   Temp 98.3  F (36.8  C) (Oral)   Resp 18   Wt 84.7 kg (186 lb 12.8 oz)   SpO2 96%   BMI 25.33 kg/m       His incisions are healing nicely. He does have some remaining skin irritation from previous adhesives.    From a personal  perspective, he is here with his , Alberto.     DARIEL Singer is a 54 year-old male status post laparoscopic left diaphragm plication. He is here for post operative follow up.     We discussed ways to protect the diaphragm during activity, lifting with his knees, getting assistance with lifting things, and breathing out during exertion to relieve pressure off the diaphragm.     PLAN  I spent 25 min on the date of the encounter in chart review, patient visit, review of tests, documentation and/or discussion with other providers about the issues documented above. I reviewed the plan as follows:  Follow up with me in 1 year with sitting/supine PFT and a CXR prior.  All questions were answered and the patient and present family were in agreement with the plan.  I appreciate the opportunity to participate in the care of your patient and will keep you updated.  Sincerely,      Again, thank you for allowing me to participate in the care of your patient.        Sincerely,        ALEXANDRA Flores    Electronically signed

## 2025-01-21 NOTE — PROGRESS NOTES
THORACIC SURGERY FOLLOW UP VISIT    I saw Mr. Enmanuel Randall in follow-up today. The clinical summary follows:     PREOP DIAGNOSIS   Left diaphragm paralysis  PROCEDURE   Laparoscopic left diaphragm plication  DATE OF PROCEDURE  12/23/2024    COMPLICATIONS  None    INTERVAL STUDIES  CXR 01/20/2025: Decreased left basilar opacities with residual elevated left hemidiaphragm and left basilar atelectasis and small left effusion. There is now a more focal presumed eventration of the left hemidiaphragm, than on prior studies.        Past Medical History:   Diagnosis Date    Cervical radiculopathy     Diaphragm paralysis     Duodenal ulcer 09/22/2023    Dyslipidemia     ED (erectile dysfunction)     Seborrheic dermatitis     Spinal stenosis in cervical region     Tobacco use disorder     Upper GI bleed       Past Surgical History:   Procedure Laterality Date    BACK SURGERY      C4-C5 surgery    COLONOSCOPY      ESOPHAGOSCOPY, GASTROSCOPY, DUODENOSCOPY (EGD), COMBINED N/A 09/03/2023    Procedure: ESOPHAGOGASTRODUODENOSCOPY with EPINEPHRINE INJECTION and CAUTERY and BIOPSY;  Surgeon: Eliazar Leyva MD;  Location: [facility] Main OR    HERNIA REPAIR Left     inguinal hernia repair    LAPAROSCOPIC DIAPHRAMIC PLICATION Left 12/23/2024    Procedure: PLICATION, DIAPHRAGM, LAPAROSCOPIC;  Surgeon: Lesia Mora MD;  Location: [facility] OR    REPLACE DISK CERVICAL ANTERIOR N/A 10/10/2023    Procedure: Cervical 5 to cervical 6 arthroplasty;  Surgeon: Jered Salvador MD;  Location:  OR      Social History     Socioeconomic History    Marital status:      Spouse name: Not on file    Number of children: Not on file    Years of education: Not on file    Highest education level: Not on file   Occupational History    Not on file   Tobacco Use    Smoking status: Former     Current packs/day: 0.00     Average packs/day: 1 pack/day for 10.0 years (10.0 ttl pk-yrs)     Types: Cigarettes     Start date: 10/2/2013      Quit date: 10/2/2023     Years since quittin.3    Smokeless tobacco: Never   Vaping Use    Vaping status: Never Used   Substance and Sexual Activity    Alcohol use: Yes     Comment: 1-2x per month    Drug use: Never    Sexual activity: Not on file   Other Topics Concern    Not on file   Social History Narrative    Not on file     Social Drivers of Health     Financial Resource Strain: Low Risk  (2024)    Financial Resource Strain     Within the past 12 months, have you or your family members you live with been unable to get utilities (heat, electricity) when it was really needed?: No   Food Insecurity: Low Risk  (2024)    Food Insecurity     Within the past 12 months, did you worry that your food would run out before you got money to buy more?: No     Within the past 12 months, did the food you bought just not last and you didn t have money to get more?: No   Transportation Needs: Low Risk  (2024)    Transportation Needs     Within the past 12 months, has lack of transportation kept you from medical appointments, getting your medicines, non-medical meetings or appointments, work, or from getting things that you need?: No   Physical Activity: Insufficiently Active (2019)    Received from [facility]     Exercise Vital Sign     Days of Exercise per Week: 3 days     Minutes of Exercise per Session: 30 min   Stress: No Stress Concern Present (2019)    Received from [facility]     [employer] - Occupational Stress Questionnaire     Feeling of Stress : Not at all   Social Connections: Socially Integrated (2019)    Received from [facility]     Social Connection and Isolation Panel [NHANES]     Frequency of Communication with Friends and Family: More than three times a week     Frequency of Social Gatherings with Friends and Family: Once a week     Attends  [employer] Services: 1 to 4 times per year     Active Member of Clubs or Organizations: Yes     Attends Club or Organization Meetings: More than 4 times per year     Marital Status:    Interpersonal Safety: Low Risk  (12/23/2024)    Interpersonal Safety     Do you feel physically and emotionally safe where you currently live?: Yes     Within the past 12 months, have you been hit, slapped, kicked or otherwise physically hurt by someone?: No     Within the past 12 months, have you been humiliated or emotionally abused in other ways by your partner or ex-partner?: No   Housing Stability: Low Risk  (12/24/2024)    Housing Stability     Do you have housing? : Yes     Are you worried about losing your housing?: No      SUBJECTIVE  Enmanuel is doing well. He does not really have ongoing surgical pain. He has some occasional discomfort but nothing that is hindering his daily life. 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 His , Alberto is very helpful in keeping him within the restrictions Dr. Durán gave him. 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    OBJECTIVE  BP (!) 147/102 (BP Location: Right arm, Patient Position: Sitting, Cuff Size: Adult Regular)   Pulse 82   Temp 98.3  F (36.8  C) (Oral)   Resp 18   Wt 84.7 kg (186 lb 12.8 oz)   SpO2 96%   BMI 25.33 kg/m       His incisions are healing nicely. He does have some remaining skin irritation from previous adhesives.    From a personal perspective, he is here with his , Alberto.     IMPRESSION   Enmanuel is a 54 year-old male status post laparoscopic left diaphragm plication. He is here for post operative follow up.     We discussed ways to protect the diaphragm during activity, lifting with his knees, getting  assistance with lifting things, and breathing out during exertion to relieve pressure off the diaphragm.     PLAN  I spent 25 min on the date of the encounter in chart review, patient visit, review of tests, documentation and/or discussion with other providers about the issues documented above. I reviewed the plan as follows:  Follow up with me in 1 year with sitting/supine PFT and a CXR prior.  All questions were answered and the patient and present family were in agreement with the plan.  I appreciate the opportunity to participate in the care of your patient and will keep you updated.  Sincerely,

## 2025-05-18 ENCOUNTER — HEALTH MAINTENANCE LETTER (OUTPATIENT)
Age: 55
End: 2025-05-18

## (undated) DEVICE — ESU PENCIL W/HOLSTER E2350H

## (undated) DEVICE — NDL SCLEROTHERAPY 25GA CARR-LOCK  00711811

## (undated) DEVICE — SYR BULB IRRIG DOVER 60 ML LATEX FREE 67000

## (undated) DEVICE — ESU ELEC BLADE 2.75" COATED/INSULATED E1455

## (undated) DEVICE — PIN DISTRACTION ANCHOR FOR SCR 14MM MDS9091414

## (undated) DEVICE — DRSG KERLIX 4 1/2"X4YDS ROLL 6715

## (undated) DEVICE — SU VICRYL 3-0 SH CR 8X18" J774

## (undated) DEVICE — TUBING SUCTION MEDI-VAC 1/4"X20' N620A - HE

## (undated) DEVICE — ANTIFOG SOLUTION W/FOAM PAD CF-1001

## (undated) DEVICE — PREP CHLORAPREP 26ML TINTED HI-LITE ORANGE 930815

## (undated) DEVICE — TOOL DISSECT MIDAS MR8 14CM MATCH HEAD 3MM MR8-14MH30

## (undated) DEVICE — ESU GROUND PAD UNIVERSAL W/O CORD

## (undated) DEVICE — DRSG STERI STRIP 1/2X4" R1547

## (undated) DEVICE — GLOVE BIOGEL PI MICRO SZ 7.5 48575

## (undated) DEVICE — LINEN TOWEL PACK X5 5464

## (undated) DEVICE — NDL SPINAL 18GA 3.5" 405184

## (undated) DEVICE — DRAPE MAYO STAND 23X54 8337

## (undated) DEVICE — DEVICE SUTURE PASSER 14GA WECK EFX EFXSP2

## (undated) DEVICE — DRAPE STERI FLUOROSCOPE 35X43"1012 LATEX FREE

## (undated) DEVICE — SPONGE SURGIFOAM 100 1974

## (undated) DEVICE — Device

## (undated) DEVICE — SPONGE RAY-TEC 4X8" 7318

## (undated) DEVICE — SOL WATER IRRIG 1000ML BOTTLE 2F7114

## (undated) DEVICE — SU PLEDGET SOFT TFE 13MMX7MMX1.5MM D7044

## (undated) DEVICE — ENDO TROCAR SLEEVE KII Z-THREADED 12X100MM CTS22

## (undated) DEVICE — SURGICEL HEMOSTAT 4X8" 1952

## (undated) DEVICE — DRAPE SHEET REV FOLD 3/4 9349

## (undated) DEVICE — GLOVE BIOGEL PI MICRO INDICATOR UNDERGLOVE SZ 8.0 48980

## (undated) DEVICE — SPONGE KITTNER 31001010

## (undated) DEVICE — TUBING SUCTION SOFT 20'X3/16" 0036570

## (undated) DEVICE — ESU CORD BIPOLAR 12' E0512

## (undated) DEVICE — STRAP POSITIONING VELCRO 13' CERVICAL HARNESS 920877

## (undated) DEVICE — CONNECTOR BLAKE DRAIN SGL BCC1

## (undated) DEVICE — DRAPE IOBAN INCISE 23X17" 6650EZ

## (undated) DEVICE — SUCTION MANIFOLD NEPTUNE 2 SYS 4 PORT 0702-020-000

## (undated) DEVICE — RX SURGIFLO HEMOSTATIC MATRIX W/THROMBIN 8ML 2994

## (undated) DEVICE — ADH LIQUID MASTISOL TOPICAL VIAL 2-3ML 0523-48

## (undated) DEVICE — ENDO TROCAR FIRST ENTRY KII FIOS Z-THRD 05X100MM CTF03

## (undated) DEVICE — LINEN TOWEL PACK X6 WHITE 5487

## (undated) DEVICE — DRAPE COVER C-ARM SEAMLESS SNAP-KAP 03-KP26 LATEX FREE

## (undated) DEVICE — SU SILK 0 SH 30" K834H

## (undated) DEVICE — SU VICRYL 4-0 PS-2 18" UND J496H

## (undated) DEVICE — SU VICRYL+ 0 54 UNDYED VCP608H

## (undated) DEVICE — DRAIN JACKSON PRATT ROUND SIL 19FR W/TROCAR LF JP-2232

## (undated) DEVICE — FORCEP BIOPSY 2.3MM DISP COATED 000388

## (undated) DEVICE — MANIFOLD NEPTUNE 4 PORT 700-20

## (undated) DEVICE — PREP DURAPREP 06ML APL 8635

## (undated) DEVICE — BLADE CLIPPER DISP 4406

## (undated) DEVICE — SUCTION MANIFOLD NEPTUNE 2 SYS 1 PORT 702-025-000

## (undated) DEVICE — LINEN GOWN XLG 5407

## (undated) DEVICE — SU ETHILON 3-0 PS-2 18" 1669H

## (undated) DEVICE — ENDO SCOPE WARMER SEAL  C3101

## (undated) DEVICE — SUCTION DRY CHEST DRAIN OASIS 3600-100

## (undated) DEVICE — ENDO TROCAR SLEEVE KII Z-THREADED 05X100MM CTS02

## (undated) DEVICE — SU TICRON 2 GS-21DA 36" 3146-81

## (undated) DEVICE — DRAPE POUCH INSTRUMENT 1018

## (undated) DEVICE — ESU GROUND PAD ADULT W/CORD E7507

## (undated) DEVICE — DRAPE MICROSCOPE LEICA 54X150" AR8033650

## (undated) DEVICE — STPL SKIN 35W ROTATING HEAD PRW35

## (undated) DEVICE — SU MONOCRYL 4-0 PS-2 18" UND Y496G

## (undated) DEVICE — ENDO APPLICATOR SURGIFLO PLASMA COBLATION MS1995

## (undated) DEVICE — PACK SPINE SM CUSTOM SNE15SSFSK

## (undated) DEVICE — TUBING SUCTION 10'X3/16" N510

## (undated) DEVICE — ESU PENCIL SMOKE EVAC W/ROCKER SWITCH 0703-047-000

## (undated) DEVICE — PROBE BICAP 7FR BP-7300A

## (undated) DEVICE — SPONGE COTTONOID 1/2X1/2" 80-1400

## (undated) DEVICE — GOWN XLG DISP 9545

## (undated) DEVICE — ENDO TROCAR FIRST ENTRY KII FIOS Z-THRD 12X100MM CTF73

## (undated) DEVICE — DRSG PRIMAPORE 02X3" 7133

## (undated) RX ORDER — HYDROMORPHONE HCL IN WATER/PF 6 MG/30 ML
PATIENT CONTROLLED ANALGESIA SYRINGE INTRAVENOUS
Status: DISPENSED
Start: 2023-10-10

## (undated) RX ORDER — BUPIVACAINE HYDROCHLORIDE 2.5 MG/ML
INJECTION, SOLUTION EPIDURAL; INFILTRATION; INTRACAUDAL
Status: DISPENSED
Start: 2024-12-23

## (undated) RX ORDER — HYDROMORPHONE HCL IN WATER/PF 6 MG/30 ML
PATIENT CONTROLLED ANALGESIA SYRINGE INTRAVENOUS
Status: DISPENSED
Start: 2024-12-23

## (undated) RX ORDER — OXYCODONE HYDROCHLORIDE 5 MG/1
TABLET ORAL
Status: DISPENSED
Start: 2023-10-10

## (undated) RX ORDER — ESMOLOL HYDROCHLORIDE 10 MG/ML
INJECTION INTRAVENOUS
Status: DISPENSED
Start: 2024-12-23

## (undated) RX ORDER — FENTANYL CITRATE-0.9 % NACL/PF 10 MCG/ML
PLASTIC BAG, INJECTION (ML) INTRAVENOUS
Status: DISPENSED
Start: 2024-12-23

## (undated) RX ORDER — DEXAMETHASONE SODIUM PHOSPHATE 4 MG/ML
INJECTION, SOLUTION INTRA-ARTICULAR; INTRALESIONAL; INTRAMUSCULAR; INTRAVENOUS; SOFT TISSUE
Status: DISPENSED
Start: 2023-10-10

## (undated) RX ORDER — DEXAMETHASONE SODIUM PHOSPHATE 4 MG/ML
INJECTION, SOLUTION INTRA-ARTICULAR; INTRALESIONAL; INTRAMUSCULAR; INTRAVENOUS; SOFT TISSUE
Status: DISPENSED
Start: 2024-12-23

## (undated) RX ORDER — CLINDAMYCIN PHOSPHATE 900 MG/50ML
INJECTION, SOLUTION INTRAVENOUS
Status: DISPENSED
Start: 2023-10-10

## (undated) RX ORDER — CEFAZOLIN SODIUM/WATER 2 G/20 ML
SYRINGE (ML) INTRAVENOUS
Status: DISPENSED
Start: 2024-12-23

## (undated) RX ORDER — OXYCODONE HYDROCHLORIDE 10 MG/1
TABLET ORAL
Status: DISPENSED
Start: 2024-12-23

## (undated) RX ORDER — EPHEDRINE SULFATE 50 MG/ML
INJECTION, SOLUTION INTRAMUSCULAR; INTRAVENOUS; SUBCUTANEOUS
Status: DISPENSED
Start: 2023-10-10

## (undated) RX ORDER — KETOROLAC TROMETHAMINE 30 MG/ML
INJECTION, SOLUTION INTRAMUSCULAR; INTRAVENOUS
Status: DISPENSED
Start: 2024-12-23

## (undated) RX ORDER — BUPIVACAINE HYDROCHLORIDE 5 MG/ML
INJECTION, SOLUTION EPIDURAL; INTRACAUDAL
Status: DISPENSED
Start: 2023-10-10

## (undated) RX ORDER — GLYCOPYRROLATE 0.2 MG/ML
INJECTION, SOLUTION INTRAMUSCULAR; INTRAVENOUS
Status: DISPENSED
Start: 2024-12-23

## (undated) RX ORDER — NEOSTIGMINE METHYLSULFATE 1 MG/ML
VIAL (ML) INJECTION
Status: DISPENSED
Start: 2024-12-23

## (undated) RX ORDER — ACETAMINOPHEN 500 MG
TABLET ORAL
Status: DISPENSED
Start: 2024-12-23

## (undated) RX ORDER — HYDROMORPHONE HYDROCHLORIDE 1 MG/ML
INJECTION, SOLUTION INTRAMUSCULAR; INTRAVENOUS; SUBCUTANEOUS
Status: DISPENSED
Start: 2024-12-23

## (undated) RX ORDER — EPINEPHRINE 1 MG/ML
INJECTION, SOLUTION INTRAMUSCULAR; SUBCUTANEOUS
Status: DISPENSED
Start: 2023-10-10

## (undated) RX ORDER — DEXMEDETOMIDINE HYDROCHLORIDE 4 UG/ML
INJECTION, SOLUTION INTRAVENOUS
Status: DISPENSED
Start: 2023-10-10

## (undated) RX ORDER — HYDROXYZINE HYDROCHLORIDE 25 MG/1
TABLET, FILM COATED ORAL
Status: DISPENSED
Start: 2024-12-23

## (undated) RX ORDER — ACETAMINOPHEN 325 MG/1
TABLET ORAL
Status: DISPENSED
Start: 2024-12-23

## (undated) RX ORDER — LIDOCAINE HYDROCHLORIDE 10 MG/ML
INJECTION, SOLUTION EPIDURAL; INFILTRATION; INTRACAUDAL; PERINEURAL
Status: DISPENSED
Start: 2024-12-23

## (undated) RX ORDER — ACETAMINOPHEN 500 MG
TABLET ORAL
Status: DISPENSED
Start: 2023-10-10

## (undated) RX ORDER — ESMOLOL HYDROCHLORIDE 10 MG/ML
INJECTION INTRAVENOUS
Status: DISPENSED
Start: 2023-10-10

## (undated) RX ORDER — HYDROMORPHONE HYDROCHLORIDE 1 MG/ML
INJECTION, SOLUTION INTRAMUSCULAR; INTRAVENOUS; SUBCUTANEOUS
Status: DISPENSED
Start: 2023-10-10

## (undated) RX ORDER — LIDOCAINE HYDROCHLORIDE 10 MG/ML
INJECTION, SOLUTION EPIDURAL; INFILTRATION; INTRACAUDAL; PERINEURAL
Status: DISPENSED
Start: 2023-09-03

## (undated) RX ORDER — ONDANSETRON 2 MG/ML
INJECTION INTRAMUSCULAR; INTRAVENOUS
Status: DISPENSED
Start: 2024-12-23

## (undated) RX ORDER — GABAPENTIN 300 MG/1
CAPSULE ORAL
Status: DISPENSED
Start: 2023-10-10

## (undated) RX ORDER — PROPOFOL 10 MG/ML
INJECTION, EMULSION INTRAVENOUS
Status: DISPENSED
Start: 2023-10-10

## (undated) RX ORDER — ONDANSETRON 2 MG/ML
INJECTION INTRAMUSCULAR; INTRAVENOUS
Status: DISPENSED
Start: 2023-10-10